# Patient Record
Sex: MALE | Race: BLACK OR AFRICAN AMERICAN | NOT HISPANIC OR LATINO | Employment: FULL TIME | URBAN - METROPOLITAN AREA
[De-identification: names, ages, dates, MRNs, and addresses within clinical notes are randomized per-mention and may not be internally consistent; named-entity substitution may affect disease eponyms.]

---

## 2017-10-26 ENCOUNTER — TELEPHONE (OUTPATIENT)
Dept: OPTOMETRY | Facility: CLINIC | Age: 52
End: 2017-10-26

## 2017-10-26 NOTE — TELEPHONE ENCOUNTER
LVM reminding pt of appt time,date and location advised to be on time no later than 15 min after scheduled apt time advised to call [phone] to reschedule if needed.

## 2017-10-26 NOTE — PROGRESS NOTES
Subjective:       Patient ID: Roberto Edgar is a 52 y.o. male.    Chief Complaint: Employment Physical    HPI   52 y.o. male here for annual physical exam. He is new to me and new to [facility].  He just moved here from [place].    Chronic medical issues:  HTN- well controlled on cozaar 100mg, did not take medication this am, he denies Cp or SOB.  No dizziness or lightheadedness.  He has been noticing vision changes and has appt with eye doctor today.    B/l Shoulder pain: since 2010, he was in the  and very physical work, he has had numbness in the 2nd and 3rd fingers on the left hand, he has pain on both sides of his shoulders, decreased ROM, he has had cortisone injection in th left shoulder in the past, decreased strength.     Health maintenance    Vaccines: Influenza declines   Tetanus 2015   A1c: due  HIV: agrees ages 15-65  Sexual Screening: declines  STD screening: declines  Eye exam: UTD annual  DDS exam: UTD q6 mos.  Prostate: PSA annual due  Colonoscopy: ages 50-75  Lipid disorders: due          Exercise: not exercising right now 2/2 shoulder pain   Diet: eats healthy for the most part      Past Medical History:   Diagnosis Date    Hypertension     Shoulder pain, bilateral       Past Surgical History:   Procedure Laterality Date    HERNIA REPAIR       Social History     Social History    Marital status:      Spouse name: N/A    Number of children: N/A    Years of education: N/A     Occupational History    Not on file.     Social History Main Topics    Smoking status: Never Smoker    Smokeless tobacco: Never Used    Alcohol use Yes      Comment: once in a while    Drug use: No    Sexual activity: Not Currently     Other Topics Concern    Not on file     Social History Narrative    No narrative on file     Review of patient's allergies indicates:  Allergies not on file  Mr. dEgar had no medications administered during this visit.      Review of Systems   Constitutional: Negative  for activity change, chills and fever.   HENT: Positive for sore throat. Negative for congestion, rhinorrhea, sinus pain and sinus pressure.    Eyes: Negative for pain and redness.   Respiratory: Positive for cough. Negative for shortness of breath.    Cardiovascular: Negative for chest pain and palpitations.   Gastrointestinal: Negative for abdominal pain, diarrhea, nausea and vomiting.   Genitourinary: Negative for difficulty urinating.        +ED     Musculoskeletal: Positive for arthralgias and neck pain. Negative for back pain.   Skin: Negative for rash.   Neurological: Positive for weakness (in shoulders). Negative for headaches.   Psychiatric/Behavioral: Negative for dysphoric mood and sleep disturbance.     Objective:     Vitals:    10/27/17 0903   BP: (!) 142/92   Pulse: 85   Resp: 16   Temp: 98.4 °F (36.9 °C)    Body mass index is 29.48 kg/m².  Physical Exam   Constitutional: He is oriented to person, place, and time. He appears well-developed and well-nourished.   HENT:   Head: Normocephalic and atraumatic.   Mouth/Throat: Oropharynx is clear and moist.   Eyes: Conjunctivae are normal. Pupils are equal, round, and reactive to light.   Neck: Normal range of motion. Neck supple. No thyromegaly present.   Cardiovascular: Normal rate, regular rhythm and normal heart sounds.  Exam reveals no gallop and no friction rub.    No murmur heard.  Pulmonary/Chest: Effort normal and breath sounds normal. He has no wheezes. He has no rales.   Abdominal: Soft. Bowel sounds are normal. There is no tenderness. There is no rebound and no guarding.   Musculoskeletal: Normal range of motion. He exhibits tenderness. He exhibits no edema.        Right shoulder: He exhibits tenderness (anterior shoulder), pain and decreased strength. He exhibits normal range of motion, no bony tenderness and no swelling.        Left shoulder: He exhibits tenderness (anterior shoulder), pain and decreased strength. He exhibits normal range of  motion, no bony tenderness and no swelling.   Lymphadenopathy:     He has no cervical adenopathy.   Neurological: He is alert and oriented to person, place, and time. He displays normal reflexes. No sensory deficit. Coordination normal.   Skin: Skin is warm and dry. No rash noted. No erythema.   Psychiatric: He has a normal mood and affect. Judgment normal.     Assessment:     1. Annual physical exam    2. Colon cancer screening    3. Encounter to establish care with new doctor    4. Encounter for screening for HIV    5. Essential hypertension    6. Chronic pain of both shoulders    7. Screening for prostate cancer    8. Erectile dysfunction, unspecified erectile dysfunction type      Plan:   1. Annual physical exam  - Comprehensive metabolic panel; Future  - CBC auto differential; Future  - Hemoglobin A1c; Future  - Lipid panel; Future  - TSH; Future  - Urinalysis Microscopic; Future  - Counseled patient on need to get regular exercise at least 30 minutes a day at high intensity 5 days a week.    2. Colon cancer screening  - Case request GI: COLONOSCOPY    3. Encounter to establish care with new doctor    4. Encounter for screening for HIV  - HIV-1 and HIV-2 antibodies; Future    5. Essential hypertension  - stable on cozaar 100mg    6. Chronic pain of both shoulders  - Ambulatory Referral to Orthopedics  - naproxen (NAPROSYN) 500 MG tablet; Take 1 tablet (500 mg total) by mouth 2 (two) times daily.  Dispense: 60 tablet; Refill: 1    7. Screening for prostate cancer  - PSA, Screening; Future    8. Erectile dysfunction, unspecified erectile dysfunction type  - tadalafil (CIALIS) 10 MG tablet; Take 1 tablet (10 mg total) by mouth daily as needed for Erectile Dysfunction.  Dispense: 30 tablet; Refill: 3  - trial cialis, if no improvement patient will let me know and we can get him to see urology      Return to clinic in one year for annual exam or sooner if dictated by labs or illness.

## 2017-10-27 ENCOUNTER — TELEPHONE (OUTPATIENT)
Dept: OPTOMETRY | Facility: CLINIC | Age: 52
End: 2017-10-27

## 2017-10-27 ENCOUNTER — OFFICE VISIT (OUTPATIENT)
Dept: INTERNAL MEDICINE | Facility: CLINIC | Age: 52
End: 2017-10-27
Payer: COMMERCIAL

## 2017-10-27 ENCOUNTER — OFFICE VISIT (OUTPATIENT)
Dept: OPTOMETRY | Facility: CLINIC | Age: 52
End: 2017-10-27
Payer: COMMERCIAL

## 2017-10-27 ENCOUNTER — LAB VISIT (OUTPATIENT)
Dept: LAB | Facility: HOSPITAL | Age: 52
End: 2017-10-27
Attending: INTERNAL MEDICINE
Payer: COMMERCIAL

## 2017-10-27 VITALS
HEIGHT: 64 IN | BODY MASS INDEX: 29.32 KG/M2 | TEMPERATURE: 98 F | SYSTOLIC BLOOD PRESSURE: 142 MMHG | DIASTOLIC BLOOD PRESSURE: 92 MMHG | HEART RATE: 85 BPM | WEIGHT: 171.75 LBS | OXYGEN SATURATION: 97 % | RESPIRATION RATE: 16 BRPM

## 2017-10-27 DIAGNOSIS — Z12.5 SCREENING FOR PROSTATE CANCER: ICD-10-CM

## 2017-10-27 DIAGNOSIS — H52.4 MYOPIA OF BOTH EYES WITH ASTIGMATISM AND PRESBYOPIA: Primary | ICD-10-CM

## 2017-10-27 DIAGNOSIS — Z00.00 ANNUAL PHYSICAL EXAM: ICD-10-CM

## 2017-10-27 DIAGNOSIS — Z11.4 ENCOUNTER FOR SCREENING FOR HIV: ICD-10-CM

## 2017-10-27 DIAGNOSIS — Z00.00 ANNUAL PHYSICAL EXAM: Primary | ICD-10-CM

## 2017-10-27 DIAGNOSIS — I10 ESSENTIAL HYPERTENSION: ICD-10-CM

## 2017-10-27 DIAGNOSIS — Z76.89 ENCOUNTER TO ESTABLISH CARE WITH NEW DOCTOR: ICD-10-CM

## 2017-10-27 DIAGNOSIS — M25.511 CHRONIC PAIN OF BOTH SHOULDERS: ICD-10-CM

## 2017-10-27 DIAGNOSIS — H52.203 MYOPIA OF BOTH EYES WITH ASTIGMATISM AND PRESBYOPIA: Primary | ICD-10-CM

## 2017-10-27 DIAGNOSIS — M25.512 CHRONIC PAIN OF BOTH SHOULDERS: ICD-10-CM

## 2017-10-27 DIAGNOSIS — N52.9 ERECTILE DYSFUNCTION, UNSPECIFIED ERECTILE DYSFUNCTION TYPE: ICD-10-CM

## 2017-10-27 DIAGNOSIS — H52.13 MYOPIA OF BOTH EYES WITH ASTIGMATISM AND PRESBYOPIA: Primary | ICD-10-CM

## 2017-10-27 DIAGNOSIS — G89.29 CHRONIC PAIN OF BOTH SHOULDERS: ICD-10-CM

## 2017-10-27 DIAGNOSIS — Z12.11 COLON CANCER SCREENING: ICD-10-CM

## 2017-10-27 LAB
ALBUMIN SERPL BCP-MCNC: 3.8 G/DL
ALP SERPL-CCNC: 75 U/L
ALT SERPL W/O P-5'-P-CCNC: 44 U/L
ANION GAP SERPL CALC-SCNC: 12 MMOL/L
AST SERPL-CCNC: 41 U/L
BASOPHILS # BLD AUTO: 0.07 K/UL
BASOPHILS NFR BLD: 1.1 %
BILIRUB SERPL-MCNC: 0.3 MG/DL
BUN SERPL-MCNC: 16 MG/DL
CALCIUM SERPL-MCNC: 9.3 MG/DL
CHLORIDE SERPL-SCNC: 105 MMOL/L
CHOLEST SERPL-MCNC: 163 MG/DL
CHOLEST/HDLC SERPL: 4 {RATIO}
CO2 SERPL-SCNC: 22 MMOL/L
COMPLEXED PSA SERPL-MCNC: 2.4 NG/ML
CREAT SERPL-MCNC: 1.1 MG/DL
DIFFERENTIAL METHOD: NORMAL
EOSINOPHIL # BLD AUTO: 0.2 K/UL
EOSINOPHIL NFR BLD: 2.3 %
ERYTHROCYTE [DISTWIDTH] IN BLOOD BY AUTOMATED COUNT: 13 %
EST. GFR  (AFRICAN AMERICAN): >60 ML/MIN/1.73 M^2
EST. GFR  (NON AFRICAN AMERICAN): >60 ML/MIN/1.73 M^2
ESTIMATED AVG GLUCOSE: 126 MG/DL
GLUCOSE SERPL-MCNC: 105 MG/DL
HBA1C MFR BLD HPLC: 6 %
HCT VFR BLD AUTO: 44.5 %
HDLC SERPL-MCNC: 41 MG/DL
HDLC SERPL: 25.2 %
HGB BLD-MCNC: 15.1 G/DL
IMM GRANULOCYTES # BLD AUTO: 0.03 K/UL
IMM GRANULOCYTES NFR BLD AUTO: 0.5 %
LDLC SERPL CALC-MCNC: 108.4 MG/DL
LYMPHOCYTES # BLD AUTO: 2.3 K/UL
LYMPHOCYTES NFR BLD: 35.6 %
MCH RBC QN AUTO: 30.1 PG
MCHC RBC AUTO-ENTMCNC: 33.9 G/DL
MCV RBC AUTO: 89 FL
MONOCYTES # BLD AUTO: 0.6 K/UL
MONOCYTES NFR BLD: 9.3 %
NEUTROPHILS # BLD AUTO: 3.4 K/UL
NEUTROPHILS NFR BLD: 51.2 %
NONHDLC SERPL-MCNC: 122 MG/DL
NRBC BLD-RTO: 0 /100 WBC
PLATELET # BLD AUTO: 206 K/UL
PMV BLD AUTO: 10.8 FL
POTASSIUM SERPL-SCNC: 3.9 MMOL/L
PROT SERPL-MCNC: 7.3 G/DL
RBC # BLD AUTO: 5.02 M/UL
SODIUM SERPL-SCNC: 139 MMOL/L
TRIGL SERPL-MCNC: 68 MG/DL
TSH SERPL DL<=0.005 MIU/L-ACNC: 2.46 UIU/ML
WBC # BLD AUTO: 6.57 K/UL

## 2017-10-27 PROCEDURE — 84153 ASSAY OF PSA TOTAL: CPT

## 2017-10-27 PROCEDURE — 85025 COMPLETE CBC W/AUTO DIFF WBC: CPT

## 2017-10-27 PROCEDURE — 80061 LIPID PANEL: CPT

## 2017-10-27 PROCEDURE — 99386 PREV VISIT NEW AGE 40-64: CPT | Mod: S$GLB,,, | Performed by: INTERNAL MEDICINE

## 2017-10-27 PROCEDURE — 92015 DETERMINE REFRACTIVE STATE: CPT | Mod: S$GLB,,, | Performed by: OPTOMETRIST

## 2017-10-27 PROCEDURE — 86703 HIV-1/HIV-2 1 RESULT ANTBDY: CPT

## 2017-10-27 PROCEDURE — 99999 PR PBB SHADOW E&M-EST. PATIENT-LVL IV: CPT | Mod: PBBFAC,,, | Performed by: INTERNAL MEDICINE

## 2017-10-27 PROCEDURE — 83036 HEMOGLOBIN GLYCOSYLATED A1C: CPT

## 2017-10-27 PROCEDURE — 99999 PR PBB SHADOW E&M-EST. PATIENT-LVL II: CPT | Mod: PBBFAC,,, | Performed by: OPTOMETRIST

## 2017-10-27 PROCEDURE — 92004 COMPRE OPH EXAM NEW PT 1/>: CPT | Mod: S$GLB,,, | Performed by: OPTOMETRIST

## 2017-10-27 PROCEDURE — 84443 ASSAY THYROID STIM HORMONE: CPT

## 2017-10-27 PROCEDURE — 36415 COLL VENOUS BLD VENIPUNCTURE: CPT | Mod: PO

## 2017-10-27 PROCEDURE — 80053 COMPREHEN METABOLIC PANEL: CPT

## 2017-10-27 RX ORDER — TADALAFIL 10 MG/1
10 TABLET ORAL DAILY PRN
Qty: 30 TABLET | Refills: 3 | Status: SHIPPED | OUTPATIENT
Start: 2017-10-27 | End: 2018-05-24

## 2017-10-27 RX ORDER — NAPROXEN 500 MG/1
500 TABLET ORAL 2 TIMES DAILY
Qty: 60 TABLET | Refills: 1 | Status: SHIPPED | OUTPATIENT
Start: 2017-10-27 | End: 2018-05-24

## 2017-10-27 RX ORDER — LOSARTAN POTASSIUM 100 MG/1
100 TABLET ORAL DAILY
COMMUNITY
End: 2018-03-12 | Stop reason: SDUPTHER

## 2017-10-27 NOTE — TELEPHONE ENCOUNTER
Called to confirm appt pt confirmed reminded of appt time,date and location advised to be on time no later than 15 min after appt time advised to call [phone] to reschedule if needed.

## 2017-10-27 NOTE — PROGRESS NOTES
HPI     First Eye Exam- does not wear glasses or CL   Pt states harder to read smaller prints and also reading street signs at a   distance. Pt states when he was younger he had a hx ocular migraines  Denies f/f  Never worn spex  No gtts     Last edited by Lavelle Melchor, OD on 10/27/2017  1:09 PM. (History)        ROS     Negative for: Constitutional, Gastrointestinal, Neurological, Skin,   Genitourinary, Musculoskeletal, HENT, Endocrine, Cardiovascular, Eyes,   Respiratory, Psychiatric, Allergic/Imm, Heme/Lymph    Last edited by Lavelle Melchor, OD on 10/27/2017  1:09 PM. (History)        Assessment /Plan     For exam results, see Encounter Report.    Myopia of both eyes with astigmatism and presbyopia      Pt never worn spex--now needs bifocals.  Discussed PALs/adaptation    PLAN:    1. Wrote spex Rx  2. rtc 1 yr

## 2017-10-30 ENCOUNTER — TELEPHONE (OUTPATIENT)
Dept: INTERNAL MEDICINE | Facility: CLINIC | Age: 52
End: 2017-10-30

## 2017-10-30 LAB — HIV 1+2 AB+HIV1 P24 AG SERPL QL IA: NEGATIVE

## 2017-10-31 NOTE — TELEPHONE ENCOUNTER
Please notify patient he is pre-diabetic.  He needs to follow heart healthy diet and exercise 5 days/week for at least 30 minutes.  We will need to recheck in 3 months and if still elevated, we will consider medication at that time.     I have reviewed rest of lab results which are normal or stable.     Clotilde Amos MD

## 2017-10-31 NOTE — TELEPHONE ENCOUNTER
Patient informed of results and verbalized understanding.  Repeat labs ordered and scheduled. Appointment slip mailed to patient.

## 2018-03-12 ENCOUNTER — LAB VISIT (OUTPATIENT)
Dept: LAB | Facility: HOSPITAL | Age: 53
End: 2018-03-12
Attending: INTERNAL MEDICINE

## 2018-03-12 ENCOUNTER — OFFICE VISIT (OUTPATIENT)
Dept: INTERNAL MEDICINE | Facility: CLINIC | Age: 53
End: 2018-03-12
Payer: COMMERCIAL

## 2018-03-12 VITALS
HEART RATE: 89 BPM | BODY MASS INDEX: 27.7 KG/M2 | HEIGHT: 64 IN | TEMPERATURE: 98 F | WEIGHT: 162.25 LBS | RESPIRATION RATE: 16 BRPM | DIASTOLIC BLOOD PRESSURE: 76 MMHG | SYSTOLIC BLOOD PRESSURE: 120 MMHG

## 2018-03-12 DIAGNOSIS — R73.03 PREDIABETES: ICD-10-CM

## 2018-03-12 DIAGNOSIS — Z09 FOLLOW UP: Primary | ICD-10-CM

## 2018-03-12 DIAGNOSIS — I10 ESSENTIAL HYPERTENSION: ICD-10-CM

## 2018-03-12 LAB
ALBUMIN SERPL BCP-MCNC: 4 G/DL
ALP SERPL-CCNC: 63 U/L
ALT SERPL W/O P-5'-P-CCNC: 29 U/L
ANION GAP SERPL CALC-SCNC: 5 MMOL/L
AST SERPL-CCNC: 24 U/L
BILIRUB SERPL-MCNC: 0.3 MG/DL
BUN SERPL-MCNC: 14 MG/DL
CALCIUM SERPL-MCNC: 9.4 MG/DL
CHLORIDE SERPL-SCNC: 107 MMOL/L
CO2 SERPL-SCNC: 29 MMOL/L
CREAT SERPL-MCNC: 1.1 MG/DL
EST. GFR  (AFRICAN AMERICAN): >60 ML/MIN/1.73 M^2
EST. GFR  (NON AFRICAN AMERICAN): >60 ML/MIN/1.73 M^2
ESTIMATED AVG GLUCOSE: 114 MG/DL
GLUCOSE SERPL-MCNC: 108 MG/DL
HBA1C MFR BLD HPLC: 5.6 %
POTASSIUM SERPL-SCNC: 4.1 MMOL/L
PROT SERPL-MCNC: 7.1 G/DL
SODIUM SERPL-SCNC: 141 MMOL/L

## 2018-03-12 PROCEDURE — 3078F DIAST BP <80 MM HG: CPT | Mod: CPTII,S$GLB,, | Performed by: INTERNAL MEDICINE

## 2018-03-12 PROCEDURE — 83036 HEMOGLOBIN GLYCOSYLATED A1C: CPT

## 2018-03-12 PROCEDURE — 3074F SYST BP LT 130 MM HG: CPT | Mod: CPTII,S$GLB,, | Performed by: INTERNAL MEDICINE

## 2018-03-12 PROCEDURE — 99214 OFFICE O/P EST MOD 30 MIN: CPT | Mod: S$GLB,,, | Performed by: INTERNAL MEDICINE

## 2018-03-12 PROCEDURE — 36415 COLL VENOUS BLD VENIPUNCTURE: CPT | Mod: PO

## 2018-03-12 PROCEDURE — 80053 COMPREHEN METABOLIC PANEL: CPT

## 2018-03-12 PROCEDURE — 99999 PR PBB SHADOW E&M-EST. PATIENT-LVL III: CPT | Mod: PBBFAC,,, | Performed by: INTERNAL MEDICINE

## 2018-03-12 RX ORDER — LOSARTAN POTASSIUM 100 MG/1
100 TABLET ORAL DAILY
Qty: 90 TABLET | Refills: 3 | Status: SHIPPED | OUTPATIENT
Start: 2018-03-12 | End: 2019-04-18 | Stop reason: SDUPTHER

## 2018-03-12 NOTE — PATIENT INSTRUCTIONS
If your blood pressure running <100 on top number, take 1/2 tab of your blood pressure medication    Call me if blood pressure running low

## 2018-03-12 NOTE — PROGRESS NOTES
Subjective:       Patient ID: Roberto Edgar is a 53 y.o. male.    Chief Complaint: Medication Refill    HPI   HTN: he notes running out of cozaar about 2 weeks ago.  He denies any dizziness or lightheadedness.  No CP or SOB.  He has been modifying his diet and has lost about 10 lbs since previous visit.  He has not been taking his cozaar and BP is well controlled.  He was pre-diabetic at previous visit and is due for follow up labs.  He has not been checking BP at home regularly.       Review of Systems   Constitutional: Negative for activity change, chills and fever.   HENT: Negative for congestion, rhinorrhea, sinus pain, sinus pressure and sore throat.    Eyes: Negative for pain and redness.   Respiratory: Negative for cough and shortness of breath.    Cardiovascular: Negative for chest pain and palpitations.   Gastrointestinal: Negative for abdominal pain, diarrhea, nausea and vomiting.   Genitourinary: Negative for difficulty urinating.   Musculoskeletal: Negative for arthralgias and back pain.   Skin: Negative for rash.   Neurological: Negative for weakness and headaches.   Psychiatric/Behavioral: Negative for dysphoric mood and sleep disturbance.     Objective:     Vitals:    03/12/18 0919   BP: 120/76   Pulse: 89   Resp: 16   Temp: 98.4 °F (36.9 °C)    Body mass index is 27.85 kg/m².  Physical Exam   Constitutional: He is oriented to person, place, and time. He appears well-developed and well-nourished.   HENT:   Head: Normocephalic and atraumatic.   Mouth/Throat: Oropharynx is clear and moist.   Eyes: Conjunctivae are normal. Pupils are equal, round, and reactive to light.   Neck: Normal range of motion. Neck supple. No thyromegaly present.   Cardiovascular: Normal rate, regular rhythm and normal heart sounds.  Exam reveals no gallop and no friction rub.    No murmur heard.  Pulmonary/Chest: Effort normal and breath sounds normal. He has no wheezes. He has no rales.   Musculoskeletal: Normal range of  motion. He exhibits no edema or tenderness.   Lymphadenopathy:     He has no cervical adenopathy.   Neurological: He is alert and oriented to person, place, and time.   Skin: Skin is warm and dry. No rash noted. No erythema.   Psychiatric: He has a normal mood and affect. Judgment normal.     Assessment:     1. Follow up    2. Essential hypertension    3. Prediabetes      Plan:   1. Follow up    2. Essential hypertension  - counseled on monitoring BP at home  - will call me if BP running low  - losartan (COZAAR) 100 MG tablet; Take 1 tablet (100 mg total) by mouth once daily.  Dispense: 90 tablet; Refill: 3    3. Prediabetes  - Counseled patient on need to get regular exercise at least 30 minutes a day at high intensity 5 days a week.  - Hemoglobin A1c; Future  - Comprehensive metabolic panel; Future      Return to clinic in six months for annual exam or sooner if dictated by labs or illness.

## 2018-03-13 ENCOUNTER — TELEPHONE (OUTPATIENT)
Dept: INTERNAL MEDICINE | Facility: CLINIC | Age: 53
End: 2018-03-13

## 2018-03-13 NOTE — TELEPHONE ENCOUNTER
Please notify patient:    I have reviewed all lab results which are normal or stable. No longer pre-diabetic! Great job! Continue weight loss, exercise and heart healthy diet.      Clotilde Amos MD

## 2018-04-26 ENCOUNTER — TELEPHONE (OUTPATIENT)
Dept: INTERNAL MEDICINE | Facility: CLINIC | Age: 53
End: 2018-04-26

## 2018-04-26 DIAGNOSIS — Z12.11 COLON CANCER SCREENING: Primary | ICD-10-CM

## 2018-04-26 DIAGNOSIS — N52.9 ERECTILE DYSFUNCTION, UNSPECIFIED ERECTILE DYSFUNCTION TYPE: Primary | ICD-10-CM

## 2018-04-26 NOTE — TELEPHONE ENCOUNTER
----- Message from Sherley Ybarra sent at 4/26/2018 11:51 AM CDT -----  Contact: pt [phone]  Pt would like to get a prescription for ebex. He was previously prescribed it by another physician and would like to get it refilled. Please advise.

## 2018-05-08 DIAGNOSIS — Z12.11 SPECIAL SCREENING FOR MALIGNANT NEOPLASMS, COLON: Primary | ICD-10-CM

## 2018-05-08 RX ORDER — POLYETHYLENE GLYCOL 3350, SODIUM SULFATE ANHYDROUS, SODIUM BICARBONATE, SODIUM CHLORIDE, POTASSIUM CHLORIDE 236; 22.74; 6.74; 5.86; 2.97 G/4L; G/4L; G/4L; G/4L; G/4L
4 POWDER, FOR SOLUTION ORAL ONCE
Qty: 4000 ML | Refills: 0 | Status: SHIPPED | OUTPATIENT
Start: 2018-05-08 | End: 2018-05-08

## 2018-05-24 ENCOUNTER — OFFICE VISIT (OUTPATIENT)
Dept: UROLOGY | Facility: CLINIC | Age: 53
End: 2018-05-24
Payer: COMMERCIAL

## 2018-05-24 ENCOUNTER — LAB VISIT (OUTPATIENT)
Dept: LAB | Facility: HOSPITAL | Age: 53
End: 2018-05-24
Attending: UROLOGY

## 2018-05-24 VITALS
HEART RATE: 73 BPM | SYSTOLIC BLOOD PRESSURE: 127 MMHG | HEIGHT: 64 IN | BODY MASS INDEX: 27.1 KG/M2 | DIASTOLIC BLOOD PRESSURE: 82 MMHG | WEIGHT: 158.75 LBS

## 2018-05-24 DIAGNOSIS — N52.01 ERECTILE DYSFUNCTION DUE TO ARTERIAL INSUFFICIENCY: Primary | ICD-10-CM

## 2018-05-24 DIAGNOSIS — I10 ESSENTIAL HYPERTENSION: ICD-10-CM

## 2018-05-24 DIAGNOSIS — N40.1 BPH WITH URINARY OBSTRUCTION: ICD-10-CM

## 2018-05-24 DIAGNOSIS — N13.8 BPH WITH URINARY OBSTRUCTION: ICD-10-CM

## 2018-05-24 DIAGNOSIS — N52.01 ERECTILE DYSFUNCTION DUE TO ARTERIAL INSUFFICIENCY: ICD-10-CM

## 2018-05-24 LAB — TESTOST SERPL-MCNC: 408 NG/DL

## 2018-05-24 PROCEDURE — 99999 PR PBB SHADOW E&M-EST. PATIENT-LVL III: CPT | Mod: PBBFAC,,, | Performed by: UROLOGY

## 2018-05-24 PROCEDURE — 36415 COLL VENOUS BLD VENIPUNCTURE: CPT

## 2018-05-24 PROCEDURE — 84403 ASSAY OF TOTAL TESTOSTERONE: CPT

## 2018-05-24 PROCEDURE — 99244 OFF/OP CNSLTJ NEW/EST MOD 40: CPT | Mod: S$GLB,,, | Performed by: UROLOGY

## 2018-05-24 RX ORDER — PAPAVERINE HYDROCHLORIDE 30 MG/ML
INJECTION INTRAMUSCULAR; INTRAVENOUS
Qty: 5 ML | Refills: 0 | Status: SHIPPED | OUTPATIENT
Start: 2018-05-24 | End: 2018-05-29 | Stop reason: ALTCHOICE

## 2018-05-24 NOTE — PATIENT INSTRUCTIONS
Penile Self-Injection Procedure  Self-injection is a good option for many men with erectile dysfunction (ED). A tiny needle is used to inject medication into the penis. This helps your penis get hard and stay that way long enough for sex. And sex and orgasm will feel as good as always. You may be nervous about doing self-injection at first. But with practice, it will get easier. Your healthcare provider will show you how to do self-injection the first time. The simple steps are outlined on this sheet.  Preparing for Injection  · Wash your hands well with soap and water.  · Prepare the medication (if needed).  · Sit or  a comfortable position in a warm, well-lit room. If you need to, sit or  front of a mirror.  · Find an injection site on one side of your penis, in a place with no visible veins. (Dont inject into the top, bottom, or head of the penis.)  · Clean the injection site with an alcohol swab. Grasp the head of your penis firmly with your thumb and forefinger (dont just pinch the skin). Stretch the penis so the skin on the shaft is taut.  Injecting the Medication  · Rest your penis against your inner thigh and pull it gently toward your knee. Dont twist or rotate it. This way youll be sure to inject the medication into the spot you chose and cleaned before.  · Hold the syringe between your thumb and fingers, like youre holding a pen. Rest your forearm on your thigh for support.  · Insert the needle at a 90-degree angle (perpendicular) to the shaft. Do this quickly to reduce discomfort. (The needle should go in easily. If it doesnt, stop right away.)  · Move your thumb to the plunger. Press down to inject the medication, counting to 5.  · Remove the needle and dispose of it safely.     The injection site can be in any part of the shaded area.     Insert the needle straight into the penis.    Gaining an Erection  · Apply pressure to the injection site for a few minutes. This prevents  swelling and bruising and helps spread the medication.   · Stand up. This may help your erection develop. Foreplay often helps, too.  · Your penis should become firm within 10-20 minutes. The erection will last long enough for sex, and maybe longer.  Call Your Doctor If You Have:   · An erection that lasts longer than 3-4  hours  · Bleeding or bruising  · Severe pain  · Scarring or curvature of the penis       © 7726-2678 [facility], [address] [place] [address], [place] [address]. All rights reserved. This information is not intended as a substitute for professional medical care. Always follow your healthcare professional's instructions.

## 2018-05-24 NOTE — PROGRESS NOTES
CHIEF COMPLAINT:    Mr. Edgar is a 53 y.o. male presenting for a consultation at the request of Dr. Amos. Patient presents with ED.    PRESENTING ILLNESS:    Roberto Edgar is a 53 y.o. male who c/o ED.  This has been present for > 1 year.  He has tried and failed Viagra and Cialis.  He has not had a T checked.    He has nocturia x 0-1.  Good FOS.  No hematuria.  No dysuria.    REVIEW OF SYSTEMS:    Roberto Edgar denies headache, blurred vision, fever, nausea, vomiting, chills, abdominal pain, bleeding per rectum, cough, SOB, recent loss of consciousness, recent mental status changes, seizures, dizziness, or upper or lower extremity weakness.    JUVE  1. 1  2. 2  3. 2  4. 1  5. 1      PATIENT HISTORY:    Past Medical History:   Diagnosis Date    Hypertension     Shoulder pain, bilateral        Past Surgical History:   Procedure Laterality Date    HERNIA REPAIR         Family History   Problem Relation Age of Onset    Hypertension Mother     Diabetes Father     Cancer Father         prostate ca    Cancer Paternal Uncle         prostate ca    Cancer Paternal Grandmother         prostate ca    Glaucoma Neg Hx     Macular degeneration Neg Hx     Retinal detachment Neg Hx     Strabismus Neg Hx     Cataracts Neg Hx     Amblyopia Neg Hx     Blindness Neg Hx        Social History     Social History    Marital status:      Spouse name: N/A    Number of children: N/A    Years of education: N/A     Occupational History    Not on file.     Social History Main Topics    Smoking status: Never Smoker    Smokeless tobacco: Never Used    Alcohol use Yes      Comment: once in a while    Drug use: No    Sexual activity: Not Currently     Other Topics Concern    Not on file     Social History Narrative    No narrative on file       Allergies:  Patient has no known allergies.    Medications:    Current Outpatient Prescriptions:     losartan (COZAAR) 100 MG tablet, Take 1 tablet (100 mg total) by  mouth once daily., Disp: 90 tablet, Rfl: 3    PHYSICAL EXAMINATION:    The patient generally appears in good health, is appropriately interactive, and is in no apparent distress.     Eyes: anicteric sclerae, moist conjunctivae; no lid-lag; PERRLA     HENT: Atraumatic; oropharynx clear with moist mucous membranes and no mucosal ulcerations;normal hard and soft palate.  No evidence of lymphadenopathy.    Neck: Trachea midline.  No thyromegaly.    Musculoskeletal: No abnormal gait.    Skin: No lesions.    Mental: Cooperative with normal affect.  Is oriented to time, place, and person.    Neuro: Grossly intact.    Chest: Normal inspiratory effort.   No accessory muscles.  No audible wheezes.  Respirations symmetric on inspiration and expiration.    Heart: Regular rhythm.      Abdomen:  Soft, non-tender. No masses or organomegaly. Bladder is not palpable. No evidence of flank discomfort. No evidence of inguinal hernia.    Genitourinary: The penis is circumcised with no evidence of plaques or induration. The urethral meatus is normal. The testes, epididymides, and cord structures are normal in size and contour bilaterally. The scrotum is normal in size and contour.    Normal anal sphincter tone. No rectal mass.    The prostate is 40 g. Normal landmarks. Lateral sulci. Median furrow intact.  No nodularity or induration. Seminal vesicles are normal.    Extremities: No clubbing, cyanosis, or edema      LABS:      Lab Results   Component Value Date    PSA 2.4 10/27/2017       IMPRESSION:    Encounter Diagnoses   Name Primary?    Erectile dysfunction due to arterial insufficiency Yes    BPH with urinary obstruction     Essential hypertension    HTN, controlled      PLAN:    1. Discussed options for his ED.  He'd like [facility]. Side effects discussed.  A new Rx was given.  Will set up for teaching.  2. Will check a T for the ED.  3. Will observe his LUTS as they don't bother him.   4. RTC 3 months.    Copy to:

## 2018-05-24 NOTE — LETTER
May 24, 2018      Clotilde Amos MD  [address] [facility] [address]           [facility] - Urology 4th Floor  [address]  [place] [address]  Phone: [phone]          Patient: Roberto Edgar   MR Number: [id number]   YOB: 1965   Date of Visit: 5/24/2018       Dear Dr. Clotilde Amos:    Thank you for referring Roberto Edgar to me for evaluation. Attached you will find relevant portions of my assessment and plan of care.    If you have questions, please do not hesitate to call me. I look forward to following Roberto Edgar along with you.    Sincerely,    Cm Candelario MD    Enclosure  CC:  No Recipients    If you would like to receive this communication electronically, please contact [email or web address] or [phone] to request more information on [employer] Link access.    For providers and/or their staff who would like to refer a patient to [facility], please contact us through our one-stop-shop provider referral line, [facility] , at [phone].    If you feel you have received this communication in error or would no longer like to receive these types of communications, please e-mail [email or web address]

## 2018-05-29 ENCOUNTER — OFFICE VISIT (OUTPATIENT)
Dept: UROLOGY | Facility: CLINIC | Age: 53
End: 2018-05-29
Payer: COMMERCIAL

## 2018-05-29 VITALS
BODY MASS INDEX: 27.31 KG/M2 | SYSTOLIC BLOOD PRESSURE: 151 MMHG | HEIGHT: 64 IN | TEMPERATURE: 99 F | DIASTOLIC BLOOD PRESSURE: 94 MMHG | HEART RATE: 79 BPM | WEIGHT: 160 LBS

## 2018-05-29 DIAGNOSIS — N48.33 DRUG-INDUCED PRIAPISM: ICD-10-CM

## 2018-05-29 DIAGNOSIS — N52.01 ERECTILE DYSFUNCTION DUE TO ARTERIAL INSUFFICIENCY: Primary | ICD-10-CM

## 2018-05-29 PROCEDURE — 99999 PR PBB SHADOW E&M-EST. PATIENT-LVL III: CPT | Mod: PBBFAC,,, | Performed by: NURSE PRACTITIONER

## 2018-05-29 PROCEDURE — 99215 OFFICE O/P EST HI 40 MIN: CPT | Mod: S$GLB,,, | Performed by: NURSE PRACTITIONER

## 2018-05-29 RX ORDER — PAPAVERINE HYDROCHLORIDE 30 MG/ML
INJECTION INTRAMUSCULAR; INTRAVENOUS
Qty: 10 ML | Refills: 11 | Status: SHIPPED | OUTPATIENT
Start: 2018-05-29 | End: 2020-01-03 | Stop reason: SDUPTHER

## 2018-05-29 RX ORDER — PHENYLEPHRINE HYDROCHLORIDE 10 MG/ML
10 INJECTION INTRAVENOUS
Status: COMPLETED | OUTPATIENT
Start: 2018-05-29 | End: 2018-05-29

## 2018-05-29 RX ORDER — PAPAVERINE HYDROCHLORIDE 30 MG/ML
INJECTION INTRAMUSCULAR; INTRAVENOUS
Qty: 10 ML | Refills: 11 | Status: SHIPPED | OUTPATIENT
Start: 2018-05-29 | End: 2018-05-29 | Stop reason: SDUPTHER

## 2018-05-29 RX ADMIN — PHENYLEPHRINE HYDROCHLORIDE 10 MG: 10 INJECTION INTRAVENOUS at 11:05

## 2018-05-29 NOTE — PROGRESS NOTES
Subjective:       Patient ID: Roberto Edgar is a 53 y.o. male.    Chief Complaint: Erectile Dysfunction (PEP inj training)    Roberto Edgar is a 53 y.o. male who c/o ED.    He was last seen in clinic with Dr. Candelario 05/24/2018.  This has been present for > 1 year.    He has tried and failed Viagra and Cialis.    His Testosterone normal.    He is here today for ICI education and 1st injection.   He brought his own medication with him.  He states no longer with full erections. Difficulty maintaining.  Had tried Edex years ago and it worked well.             Past Medical History:  No date: Hypertension  No date: Shoulder pain, bilateral    Past Surgical History:  No date: HERNIA REPAIR    Review of patient's family history indicates:  Problem: Hypertension      Relation: Mother       Age of Onset: (Not Specified)   Problem: Diabetes      Relation: Father       Age of Onset: (Not Specified)   Problem: Cancer      Relation: Father       Age of Onset: (Not Specified)       Comment: prostate ca  Problem: Cancer      Relation: Paternal Uncle       Age of Onset: (Not Specified)       Comment: prostate ca  Problem: Cancer      Relation: Paternal Grandmother       Age of Onset: (Not Specified)       Comment: prostate ca  Problem: Glaucoma      Relation: Neg Hx       Age of Onset: (Not Specified)   Problem: Macular degeneration      Relation: Neg Hx       Age of Onset: (Not Specified)   Problem: Retinal detachment      Relation: Neg Hx       Age of Onset: (Not Specified)   Problem: Strabismus      Relation: Neg Hx       Age of Onset: (Not Specified)   Problem: Cataracts      Relation: Neg Hx       Age of Onset: (Not Specified)   Problem: Amblyopia      Relation: Neg Hx       Age of Onset: (Not Specified)   Problem: Blindness      Relation: Neg Hx       Age of Onset: (Not Specified)       Social History    Marital status:             Spouse name:                       Years of education:                 Number of  children:               Occupational History    None on file    Social History Main Topics    Smoking status: Never Smoker                                                                Smokeless tobacco: Never Used                        Alcohol use: Yes                Comment: once in a while    Drug use: No              Sexual activity: Not Currently        Other Topics            Concern    None on file    Social History Narrative    None on file        Allergies:  Patient has no known allergies.    Medications:  Current Outpatient Prescriptions:   losartan (COZAAR) 100 MG tablet, Take 1 tablet (100 mg total) by mouth once daily., Disp: 90 tablet, Rfl: 3  papaverine 30 mg/mL injection, Add: Phentolamine 10 mg Add: PGE1 100 mcg  Sig:  Inject 5 units (0.05 mls) as directed, Disp: 10 mL, Rfl: 11  No current facility-administered medications for this visit.           Review of Systems   Constitutional: Negative for activity change, appetite change, chills and fever.   HENT: Negative for facial swelling and trouble swallowing.    Eyes: Negative for visual disturbance.   Respiratory: Negative for chest tightness and shortness of breath.    Cardiovascular: Negative for chest pain and palpitations.   Gastrointestinal: Negative.  Negative for abdominal pain, constipation, diarrhea, nausea and vomiting.   Genitourinary: Negative for difficulty urinating, dysuria, flank pain, hematuria, penile pain, penile swelling, scrotal swelling and testicular pain.           He has nocturia x 0-1.  Good FOS.  No hematuria.  No dysuria.   Musculoskeletal: Negative for back pain, gait problem, myalgias and neck stiffness.   Skin: Negative for rash.   Neurological: Negative for dizziness and speech difficulty.   Hematological: Does not bruise/bleed easily.   Psychiatric/Behavioral: Negative for behavioral problems.       Objective:      Physical Exam   Nursing note and vitals reviewed.  Constitutional: He is oriented to person, place,  and time. He appears well-developed and well-nourished.  Non-toxic appearance. He does not have a sickly appearance.                         PSA                      2.4                 10/27/2017               HENT:   Head: Normocephalic and atraumatic.   Right Ear: External ear normal.   Left Ear: External ear normal.   Nose: Nose normal.   Mouth/Throat: Mucous membranes are normal.   Eyes: Conjunctivae and lids are normal. No scleral icterus.   Neck: Trachea normal, normal range of motion and full passive range of motion without pain. Neck supple. No JVD present. No tracheal deviation present.   Cardiovascular: Normal rate, S1 normal and S2 normal.    Pulmonary/Chest: Effort normal. No respiratory distress. He exhibits no tenderness.   Abdominal: Soft. Normal appearance and bowel sounds are normal. There is no hepatosplenomegaly. There is no tenderness. There is no CVA tenderness.   Genitourinary: Testes normal and penis normal. Right testis shows no swelling and no tenderness. Left testis shows no swelling and no tenderness. Circumcised. No hypospadias, penile erythema or penile tenderness. No discharge found.   Musculoskeletal: Normal range of motion.   Neurological: He is alert and oriented to person, place, and time. He has normal strength.   Skin: Skin is warm, dry and intact.     Psychiatric: He has a normal mood and affect. His behavior is normal. Judgment and thought content normal.       Assessment:       1. Erectile dysfunction due to arterial insufficiency    2. Drug-induced priapism        Plan:         I spent >60 minutes with the patient of which more than half was spent in direct consultation with the patient in regards to our treatment and plan.    Education and recommendations of today's plan of care including home remedies.  We discussed ED and the contributing factors. We reviewed his personal factors that contribute to ED. Patient was educated on ED treatments. We discussed PDE-5 inhibitors,  JACKY, Nguyễn, and MEHUL.    He is here today for the Intracorporal injection/ICI education and first injection.  Educational materials were supplied.    ICI is an injectable medication that consists of three different medications to produce an erection. It is known as a Trimix Compound or PEP. The medication is a compound medication and is only mixed up at certain pharmacies known as a compound pharmacy. The medication has to be stored in the refrigerator. Improper storage decreases the effectiveness of the medication.     He was educated that it is an injection. With any injection there is risk for possible pain at the injection site as well as risk for an infection. Clean technique must be followed to help prevent infection. Proper needle disposable is necessary. He voices understanding.    The injection is best given when standing up and the penis is positioned perpendicular to the body. The urethral opening should be facing away from the body. Injection is always given on the lateral or side of the penis. Never give the injection to the top of the penis to avoid possible trauma to arteries and veins. Never give the injection to the bottom of the penis to avoid trauma to the urethra. He should alternate the injection sites to avoid the build up of scar tissue due to multiple injections.    This medication can increase the risk of erections lasting longer than 4 hours. This is known as a priapism and is a medical emergency. This requires immediate medical attention. This is main reason we give the first dose in the office today. We start at low dose and see how well the patients reacts. We can increase the medication if needed depending on the reaction the patient receives today. We discussed the fine line between enough medication for penetration and too much leading to a priapism. He voices understanding.    He witnessed me draw up 10 units (0.10ml) and he injected himself in the left lateral aspect of the penis.  Within ~10 minutes, he achieved an erection firm enough for intercourse; even firmer with standing up and stimulation.  30 minutes later he had a full erection. Still denied any discomfort.   He successfully demonstrated drawing up the appropriate dosage.   2 hours he still had full erection but no discomfort. We discussed this; he still within 4 hour window but agreed to have it reduced in the office before it got painful.   I discussed chemical detumensce and he voiced understanding  Phenylephrine 10mg/ml mixed with 19cc of sterile normal saline prepared.   11:17 BP: 129/84 P 64 1cc mixture given  11:21: /88 P 66 1cc given  11:23: /102;p 50; med held; no complaints  11:25 /89   11:35 bp 135/90 p 80. Voiced no complaints. detumescence noted.   He denied any discomfort.   He pleased at how well it worked.     He was instructed to keep the dosage at 5 units. If noticing a decrease in reaction he can increase the dosage by 5 units or 0.05ml. He may also refill the Rx.     If have any questions, please give me a call. Educational materials were given to patient and all questions were answered. He voiced understanding and left the office without a priapism.

## 2018-05-29 NOTE — PATIENT INSTRUCTIONS
Understanding Erectile Dysfunction    Erectile dysfunction (ED) is a problem getting an erection firm enough or keeping it long enough for intercourse. The problem can happen to any man at any age. But health problems that can lead to ED become more common as a man ages. Up to half of men over age 40 experience ED at some point.  Causes of ED  ED can have many causes. Most are physical. Some are emotional issues. Often, a combination of causes is involved. Causes of ED may include:  · Medical conditions such as diabetes or depression  · Smoking tobacco or marijuana  · Drinking too much alcohol  · Side effects of medications  · Injury to nerves or blood vessels  · Emotional issues such as stress or relationship problems  ED can be treated  Prescription medications for ED are available. They help many men who try them. Depending upon the cause of the ED, though, medications may not be enough. In these cases, other treatment options are available. These include erectile aids and surgery. Your health care provider can tell you more about the treatment that is right for you. And new treatments for ED are being studied. No matter what the treatment you decide on, stay in touch with your doctor. If your symptoms persist, he or she may be able to adjust your current treatment or try something new.  Date Last Reviewed: 1/1/2017  © 3095-2649 [employer]. [address] [place] [address], [place] [address]. All rights reserved. This information is not intended as a substitute for professional medical care. Always follow your healthcare professional's instructions.        Penile Self-Injection: Notes and Precautions     Man and healthcare provider sitting across from one another, talking.   Penile self-injection is a simple technique that may improve your sex life. Some men even find that self-injection leads to an increase in natural erections. If you have questions or concerns about self-injection or erectile  dysfunction (ED), talk with your healthcare provider. The information on this sheet will help you get the best results.  Notes about penile self-injection  · You may feel a mild burning during injection. This is OK. But if you feel pressure or severe pain, stop the injection. There may be a problem with the injection site.  · Only inject the medicine on the side of your penis. It may not work if injected elsewhere.  · To prevent scarring, inject in a different spot each time.  · Dont use this treatment if you have a bleeding disorder or any risk of infection.  · Get medical help right away if your erection lasts longer than 3 to 4 hours.  Work with your healthcare provider  Ask how often you can safely repeat injections, as well as any other questions you have. You and your healthcare provider will talk about follow-up exams and how to get supplies. If the medicine doesnt work or stops working over time, tell your healthcare provider.     When to call your healthcare provider  Call your healthcare provider right away if any of these occur:  · An erection that lasts longer than 3 to 4  hours  · Bleeding or bruising  · Severe pain  · Scarring or curvature of the penis   Date Last Reviewed: 1/1/2017  © 2647-6914 [employer]. [address] [place] [address]. All rights reserved. This information is not intended as a substitute for professional medical care. Always follow your healthcare professional's instructions.        Penile Self-Injection Procedure  Self-injection is a good option if you have erectile dysfunction (ED). You insert a tiny needle into your penis and inject a medicine. This helps your penis get hard and stay that way long enough for sex. And sex and orgasm will feel as good as always. You may be nervous about doing self-injection at first. But with practice, it will get easier. Your healthcare provider will show you how to do self-injection the first time.  Talk to your doctor  about any medicines you take and any medical problems you have.      Preparing for injection  · Wash your hands well with soap and water.  · Prepare the medicine (if needed).  · Sit or  a comfortable position in a warm, well-lit room. If you need to, sit or  front of a mirror.  · Find an injection site on one side of your penis, in a place with no visible veins. (Dont inject into the top, bottom, or head of the penis.)  · Clean the injection site with an alcohol swab. Grasp the head of your penis firmly with your thumb and forefinger (dont just pinch the skin). Stretch the penis so the skin on the shaft is taut.  Injecting the medicine  · Rest your penis against your inner thigh and pull it gently toward your knee. Dont twist or rotate it. This way youll be sure to inject the medicine into the spot you chose and cleaned before.  · Hold the syringe between your thumb and fingers, like youre holding a pen. Rest your forearm on your thigh for support.  · Insert the needle at a 90° angle (perpendicular) to the shaft. Do this quickly to reduce discomfort. (The needle should go in easily. If it doesnt, stop right away.)  · Move your thumb to the plunger. Press down to inject the medicine, counting to 5.  · Remove the needle and dispose of it safely.  Gaining an erection  · Apply pressure to the injection site for a few minutes. This prevents swelling and bruising and helps spread the medicine.   · Stand up. This may help your erection develop. Foreplay often helps, too.  · Your penis should become firm within 10 to 20 minutes. The erection will last long enough for sex, and maybe longer.  When to seek medical care  An erection that lasts longer than 3 to 4  hours  · Bleeding or bruising  · Severe pain  · Scarring or curvature of the penis   Date Last Reviewed: 1/7/2017  © 6234-5398 [employer]. [address] [place] [address], [place] [address]. All rights reserved. This information is not  intended as a substitute for professional medical care. Always follow your healthcare professional's instructions.

## 2018-06-21 ENCOUNTER — TELEPHONE (OUTPATIENT)
Dept: INTERNAL MEDICINE | Facility: CLINIC | Age: 53
End: 2018-06-21

## 2018-06-21 ENCOUNTER — LAB VISIT (OUTPATIENT)
Dept: LAB | Facility: HOSPITAL | Age: 53
End: 2018-06-21
Attending: INTERNAL MEDICINE
Payer: COMMERCIAL

## 2018-06-21 DIAGNOSIS — Z11.4 SCREENING FOR HIV (HUMAN IMMUNODEFICIENCY VIRUS): ICD-10-CM

## 2018-06-21 DIAGNOSIS — Z11.4 SCREENING FOR HIV (HUMAN IMMUNODEFICIENCY VIRUS): Primary | ICD-10-CM

## 2018-06-21 PROCEDURE — 86703 HIV-1/HIV-2 1 RESULT ANTBDY: CPT

## 2018-06-21 PROCEDURE — 36415 COLL VENOUS BLD VENIPUNCTURE: CPT | Mod: PO

## 2018-06-21 NOTE — TELEPHONE ENCOUNTER
----- Message from Sherley Ybarra sent at 6/21/2018  8:54 AM CDT -----  Contact: pt [phone]  Pt would like to get information on getting tested for HIV. Please advise.

## 2018-06-22 ENCOUNTER — TELEPHONE (OUTPATIENT)
Dept: INTERNAL MEDICINE | Facility: CLINIC | Age: 53
End: 2018-06-22

## 2018-06-22 LAB — HIV 1+2 AB+HIV1 P24 AG SERPL QL IA: NEGATIVE

## 2018-08-02 ENCOUNTER — ANESTHESIA (OUTPATIENT)
Dept: ENDOSCOPY | Facility: HOSPITAL | Age: 53
End: 2018-08-02
Payer: COMMERCIAL

## 2018-08-02 ENCOUNTER — HOSPITAL ENCOUNTER (OUTPATIENT)
Facility: HOSPITAL | Age: 53
Discharge: HOME | End: 2018-08-02
Attending: COLON & RECTAL SURGERY | Admitting: COLON & RECTAL SURGERY
Payer: COMMERCIAL

## 2018-08-02 ENCOUNTER — ANESTHESIA EVENT (OUTPATIENT)
Dept: ENDOSCOPY | Facility: HOSPITAL | Age: 53
End: 2018-08-02

## 2018-08-02 ENCOUNTER — SURGERY (OUTPATIENT)
Age: 53
End: 2018-08-02

## 2018-08-02 VITALS
DIASTOLIC BLOOD PRESSURE: 70 MMHG | HEART RATE: 81 BPM | TEMPERATURE: 97 F | OXYGEN SATURATION: 100 % | SYSTOLIC BLOOD PRESSURE: 120 MMHG | BODY MASS INDEX: 26.46 KG/M2 | WEIGHT: 155 LBS | RESPIRATION RATE: 18 BRPM | HEIGHT: 64 IN

## 2018-08-02 DIAGNOSIS — Z12.11 COLON CANCER SCREENING: ICD-10-CM

## 2018-08-02 DIAGNOSIS — Z12.11 SPECIAL SCREENING FOR MALIGNANT NEOPLASMS, COLON: Primary | ICD-10-CM

## 2018-08-02 PROCEDURE — 63600175 PHARM REV CODE 636 W HCPCS: Performed by: NURSE ANESTHETIST, CERTIFIED REGISTERED

## 2018-08-02 PROCEDURE — 25000003 PHARM REV CODE 250: Performed by: NURSE PRACTITIONER

## 2018-08-02 PROCEDURE — G0121 COLON CA SCRN NOT HI RSK IND: HCPCS | Performed by: COLON & RECTAL SURGERY

## 2018-08-02 PROCEDURE — 37000008 HC ANESTHESIA 1ST 15 MINUTES: Performed by: COLON & RECTAL SURGERY

## 2018-08-02 PROCEDURE — 25000003 PHARM REV CODE 250: Performed by: INTERNAL MEDICINE

## 2018-08-02 PROCEDURE — E9220 PRA ENDO ANESTHESIA: HCPCS | Mod: ,,, | Performed by: NURSE ANESTHETIST, CERTIFIED REGISTERED

## 2018-08-02 PROCEDURE — 37000009 HC ANESTHESIA EA ADD 15 MINS: Performed by: COLON & RECTAL SURGERY

## 2018-08-02 PROCEDURE — G0121 COLON CA SCRN NOT HI RSK IND: HCPCS | Mod: ,,, | Performed by: COLON & RECTAL SURGERY

## 2018-08-02 RX ORDER — LIDOCAINE HCL/PF 100 MG/5ML
SYRINGE (ML) INTRAVENOUS
Status: DISCONTINUED | OUTPATIENT
Start: 2018-08-02 | End: 2018-08-02

## 2018-08-02 RX ORDER — PROPOFOL 10 MG/ML
VIAL (ML) INTRAVENOUS CONTINUOUS PRN
Status: DISCONTINUED | OUTPATIENT
Start: 2018-08-02 | End: 2018-08-02

## 2018-08-02 RX ORDER — SODIUM CHLORIDE 0.9 % (FLUSH) 0.9 %
3 SYRINGE (ML) INJECTION
Status: DISCONTINUED | OUTPATIENT
Start: 2018-08-02 | End: 2018-08-02 | Stop reason: HOSPADM

## 2018-08-02 RX ORDER — SODIUM CHLORIDE 9 MG/ML
INJECTION, SOLUTION INTRAVENOUS CONTINUOUS
Status: DISCONTINUED | OUTPATIENT
Start: 2018-08-02 | End: 2018-08-02 | Stop reason: HOSPADM

## 2018-08-02 RX ORDER — PROPOFOL 10 MG/ML
VIAL (ML) INTRAVENOUS
Status: DISCONTINUED | OUTPATIENT
Start: 2018-08-02 | End: 2018-08-02

## 2018-08-02 RX ADMIN — SODIUM CHLORIDE: 0.9 INJECTION, SOLUTION INTRAVENOUS at 10:08

## 2018-08-02 RX ADMIN — PROPOFOL 80 MG: 10 INJECTION, EMULSION INTRAVENOUS at 11:08

## 2018-08-02 RX ADMIN — PROPOFOL 150 MCG/KG/MIN: 10 INJECTION, EMULSION INTRAVENOUS at 11:08

## 2018-08-02 RX ADMIN — LIDOCAINE HYDROCHLORIDE 40 MG: 20 INJECTION, SOLUTION INTRAVENOUS at 11:08

## 2018-08-02 NOTE — TRANSFER OF CARE
"Anesthesia Transfer of Care Note    Patient: Roberto Edgar    Procedure(s) Performed: Procedure(s) (LRB):  COLONOSCOPY (N/A)    Patient location: PACU    Anesthesia Type: general    Post pain: adequate analgesia    Post assessment: no apparent anesthetic complications    Post vital signs: stable    Level of consciousness: awake    Nausea/Vomiting: no nausea/vomiting    Complications: none    Transfer of care protocol was followed      Last vitals:   Visit Vitals  /79 (BP Location: Left arm, Patient Position: Lying)   Pulse 78   Temp 37.1 °C (98.8 °F) (Temporal)   Resp 16   Ht 5' 4" (1.626 m)   Wt 70.3 kg (155 lb)   SpO2 97%   BMI 26.61 kg/m²     "

## 2018-08-02 NOTE — ANESTHESIA POSTPROCEDURE EVALUATION
"Anesthesia Post Evaluation    Patient: Roberto Edgar    Procedure(s) Performed: Procedure(s) (LRB):  COLONOSCOPY (N/A)    Final Anesthesia Type: general  Patient location during evaluation: PACU  Patient participation: Yes- Able to Participate  Level of consciousness: awake and alert  Post-procedure vital signs: reviewed and stable  Pain management: adequate  Airway patency: patent  PONV status at discharge: No PONV  Anesthetic complications: no      Cardiovascular status: hemodynamically stable and blood pressure returned to baseline  Respiratory status: unassisted and spontaneous ventilation  Hydration status: euvolemic  Follow-up not needed.        Visit Vitals  /70 (BP Location: Left arm, Patient Position: Sitting)   Pulse 81   Temp 36.2 °C (97.1 °F)   Resp 18   Ht 5' 4" (1.626 m)   Wt 70.3 kg (155 lb)   SpO2 100%   BMI 26.61 kg/m²       Pain/Jalil Score: Pain Assessment Performed: Yes (8/2/2018 11:34 AM)  Presence of Pain: denies (8/2/2018 12:04 PM)  Jalil Score: 10 (8/2/2018 12:04 PM)      "

## 2018-08-02 NOTE — [employer] PATIENT INSTRUCTIONS
Discharge Summary/Instructions after an Endoscopic Procedure  Patient Name: Roberto Edgar  Patient MRN: [id number]  Patient YOB: 1965  Thursday, August 02, 2018  Saeid Velazquez MD  RESTRICTIONS:  During your procedure today, you received medications for sedation.  These   medications may affect your judgment, balance and coordination.  Therefore,   for 24 hours, you have the following restrictions:   - DO NOT drive a car, operate machinery, make legal/financial decisions,   sign important papers or drink alcohol.    ACTIVITY:  Today: no heavy lifting, straining or running due to procedural   sedation/anesthesia.  The following day: return to full activity including work.  DIET:  Eat and drink normally unless instructed otherwise.     TREATMENT FOR COMMON SIDE EFFECTS:  - Mild abdominal pain, nausea, belching, bloating or excessive gas:  rest,   eat lightly and use a heating pad.  - Sore Throat: treat with throat lozenges and/or gargle with warm salt   water.  - Because air was used during the procedure, expelling large amounts of air   from your rectum or belching is normal.  - If a bowel prep was taken, you may not have a bowel movement for 1-3 days.    This is normal.  SYMPTOMS TO WATCH FOR AND REPORT TO YOUR PHYSICIAN:  1. Abdominal pain or bloating, other than gas cramps.  2. Chest pain.  3. Back pain.  4. Signs of infection such as: chills or fever occurring within 24 hours   after the procedure.  5. Rectal bleeding, which would show as bright red, maroon, or black stools.   (A tablespoon of blood from the rectum is not serious, especially if   hemorrhoids are present.)  6. Vomiting.  7. Weakness or dizziness.  GO DIRECTLY TO THE NEAREST EMERGENCY ROOM IF YOU HAVE ANY OF THE FOLLOWING:      Difficulty breathing              Chills and/or fever over 101 F   Persistent vomiting and/or vomiting blood   Severe abdominal pain   Severe chest pain   Black, tarry stools   Bleeding- more than one  tablespoon   Any other symptom or condition that you feel may need urgent attention  Your doctor recommends these additional instructions:  If any biopsies were taken, your doctors clinic will contact you in 1 to 2   weeks with any results.  - Discharge patient to home (ambulatory).   - Resume previous diet indefinitely.   - Continue present medications.   - Repeat colonoscopy in 10 years for screening purposes.  For questions, problems or results please call your physician - Saeid Velazquez MD at Work:  [phone].  [facility], EMERGENCY ROOM PHONE NUMBER: [phone]  IF A COMPLICATION OR EMERGENCY SITUATION ARISES AND YOU ARE UNABLE TO REACH   YOUR PHYSICIAN - GO DIRECTLY TO THE EMERGENCY ROOM.  Saeid Velazquez MD  8/2/2018 11:31:20 AM  This report has been verified and signed electronically.  [employer]

## 2018-08-02 NOTE — H&P
Endoscopy H&P    Procedure : Colonoscopy      asymptomatic screening exam      Past Medical History:   Diagnosis Date    Hypertension     Shoulder pain, bilateral        Family History   Problem Relation Age of Onset    Hypertension Mother     Diabetes Father     Cancer Father         prostate ca    Cancer Paternal Uncle         prostate ca    Cancer Maternal Grandfather         prostate ca    Glaucoma Neg Hx     Macular degeneration Neg Hx     Retinal detachment Neg Hx     Strabismus Neg Hx     Cataracts Neg Hx     Amblyopia Neg Hx     Blindness Neg Hx        Social History     Social History    Marital status:      Spouse name: N/A    Number of children: N/A    Years of education: N/A     Occupational History    Not on file.     Social History Main Topics    Smoking status: Never Smoker    Smokeless tobacco: Never Used    Alcohol use Yes      Comment: once in a while    Drug use: No    Sexual activity: Not Currently     Other Topics Concern    Not on file     Social History Narrative    No narrative on file       Review of Systems:  Respiratory ROS: no cough, shortness of breath, or wheezing  Cardiovascular ROS: no chest pain or dyspnea on exertion  Gastrointestinal ROS: no abdominal pain, change in bowel habits, or black or bloody stools  Musculoskeletal ROS: negative  Neurological ROS: no TIA or stroke symptoms        Physical Exam:  General: no distress  Head: normocephalic  Neck: supple, symmetrical, trachea midline  Lungs:  clear to auscultation bilaterally and normal respiratory effort  Heart: regular rate and rhythm, S1, S2 normal, no murmur, rub or gallop  Abdomen: soft, non-tender non-distented; bowel sounds normal; no masses,  no organomegaly  Extremities: no cyanosis or edema, or clubbing       Deep Sedation: Mallampati Score II (hard and soft palate, upper portion of tonsils anduvula  visible)    II    Assessment and Plan:  Proceed with Colonoscopy

## 2018-08-02 NOTE — ANESTHESIA PREPROCEDURE EVALUATION
08/02/2018  Roberto Edgar is a 53 y.o., male.    Anesthesia Evaluation    I have reviewed the Patient Summary Reports.     I have reviewed the Medications.     Review of Systems  Anesthesia Hx:  Denies Hx of Anesthetic complications  Denies Family Hx of Anesthesia complications.   Denies Personal Hx of Anesthesia complications.   Hematology/Oncology:  Hematology Normal   Oncology Normal     EENT/Dental:EENT/Dental Normal   Cardiovascular:   Hypertension, well controlled    Pulmonary:  Pulmonary Normal    Renal/:  Renal/ Normal     Hepatic/GI:  Hepatic/GI Normal    Musculoskeletal:  Musculoskeletal Normal    Neurological:  Neurology Normal    Endocrine:  Endocrine Normal    Dermatological:  Skin Normal    Psych:  Psychiatric Normal           Physical Exam  General:  Well nourished    Airway/Jaw/Neck:  Airway Findings: Mouth Opening: Normal Tongue: Normal  General Airway Assessment: Adult  TM Distance: Normal, at least 6 cm       Chest/Lungs:  Chest/Lungs Clear    Heart/Vascular:  Heart Findings: Normal Heart murmur: negative Vascular Findings: Normal            Anesthesia Plan  Type of Anesthesia, risks & benefits discussed:  Anesthesia Type:  general  Patient's Preference:   Intra-op Monitoring Plan:   Intra-op Monitoring Plan Comments:   Post Op Pain Control Plan:   Post Op Pain Control Plan Comments:   Induction:   IV  Beta Blocker:  Patient is not currently on a Beta-Blocker (No further documentation required).       Informed Consent: Patient understands risks and agrees with Anesthesia plan.  Questions answered. Anesthesia consent signed with patient.  ASA Score: 2     Day of Surgery Review of History & Physical: I have interviewed and examined the patient. I have reviewed the patient's H&P dated: 8/2/2018.   H&P update referred to the surgeon.         Ready For Surgery From Anesthesia Perspective.

## 2018-08-02 NOTE — PLAN OF CARE
Pt verbalized understanding of discharge instructions including diet, s/s to notify md, and follow up. Pt refused wheelchair and will ambulate out of the unit with his uncle.

## 2018-08-09 ENCOUNTER — TELEPHONE (OUTPATIENT)
Dept: ENDOSCOPY | Facility: HOSPITAL | Age: 53
End: 2018-08-09

## 2018-08-29 ENCOUNTER — OFFICE VISIT (OUTPATIENT)
Dept: UROLOGY | Facility: CLINIC | Age: 53
End: 2018-08-29
Payer: COMMERCIAL

## 2018-08-29 VITALS
DIASTOLIC BLOOD PRESSURE: 79 MMHG | WEIGHT: 159.63 LBS | BODY MASS INDEX: 27.25 KG/M2 | HEIGHT: 64 IN | HEART RATE: 73 BPM | SYSTOLIC BLOOD PRESSURE: 122 MMHG

## 2018-08-29 DIAGNOSIS — N40.1 BPH WITH URINARY OBSTRUCTION: ICD-10-CM

## 2018-08-29 DIAGNOSIS — I10 ESSENTIAL HYPERTENSION: ICD-10-CM

## 2018-08-29 DIAGNOSIS — N52.01 ERECTILE DYSFUNCTION DUE TO ARTERIAL INSUFFICIENCY: Primary | ICD-10-CM

## 2018-08-29 DIAGNOSIS — N13.8 BPH WITH URINARY OBSTRUCTION: ICD-10-CM

## 2018-08-29 PROBLEM — Z12.11 COLON CANCER SCREENING: Status: RESOLVED | Noted: 2018-08-02 | Resolved: 2018-08-29

## 2018-08-29 PROCEDURE — 3008F BODY MASS INDEX DOCD: CPT | Mod: CPTII,S$GLB,, | Performed by: UROLOGY

## 2018-08-29 PROCEDURE — 99213 OFFICE O/P EST LOW 20 MIN: CPT | Mod: S$GLB,,, | Performed by: UROLOGY

## 2018-08-29 PROCEDURE — 3074F SYST BP LT 130 MM HG: CPT | Mod: CPTII,S$GLB,, | Performed by: UROLOGY

## 2018-08-29 PROCEDURE — 3078F DIAST BP <80 MM HG: CPT | Mod: CPTII,S$GLB,, | Performed by: UROLOGY

## 2018-08-29 PROCEDURE — 99999 PR PBB SHADOW E&M-EST. PATIENT-LVL III: CPT | Mod: PBBFAC,,, | Performed by: UROLOGY

## 2018-08-29 NOTE — PROGRESS NOTES
CHIEF COMPLAINT:    Mr. Edgar is a 53 y.o. male presenting with ED.    PRESENTING ILLNESS:    Roberto Edgar is a 53 y.o. male who c/o ED.  This has been present for > 1 year.  He has tried and failed Viagra and Cialis.  His T is normal.  He's currently using ICI with good results.  He's been using 10 units, but his erections have been lasting > 4 hours.    He has nocturia x 0-1.  Good FOS.  No hematuria.  No dysuria.    REVIEW OF SYSTEMS:    Roberto Edgar denies headache, blurred vision, fever, nausea, vomiting, chills, abdominal pain, bleeding per rectum, cough, SOB, recent loss of consciousness, recent mental status changes, seizures, dizziness, or upper or lower extremity weakness.    JUVE  1. 1  2. 1  3. 0  4. 2  5. 1     PATIENT HISTORY:    Past Medical History:   Diagnosis Date    Hypertension     Shoulder pain, bilateral        Past Surgical History:   Procedure Laterality Date    HERNIA REPAIR         Family History   Problem Relation Age of Onset    Hypertension Mother     Diabetes Father     Cancer Father         prostate ca    Cancer Paternal Uncle         prostate ca    Cancer Maternal Grandfather         prostate ca    Glaucoma Neg Hx     Macular degeneration Neg Hx     Retinal detachment Neg Hx     Strabismus Neg Hx     Cataracts Neg Hx     Amblyopia Neg Hx     Blindness Neg Hx        Social History     Socioeconomic History    Marital status:      Spouse name: Not on file    Number of children: Not on file    Years of education: Not on file    Highest education level: Not on file   Social Needs    Financial resource strain: Not on file    Food insecurity - worry: Not on file    Food insecurity - inability: Not on file    Transportation needs - medical: Not on file    Transportation needs - non-medical: Not on file   Occupational History    Not on file   Tobacco Use    Smoking status: Never Smoker    Smokeless tobacco: Never Used   Substance and Sexual Activity     Alcohol use: Yes     Comment: once in a while    Drug use: No    Sexual activity: Not Currently   Other Topics Concern    Not on file   Social History Narrative    Not on file       Allergies:  Patient has no known allergies.    Medications:    Current Outpatient Medications:     losartan (COZAAR) 100 MG tablet, Take 1 tablet (100 mg total) by mouth once daily., Disp: 90 tablet, Rfl: 3    papaverine 30 mg/mL injection, Add: Phentolamine 10 mg Add: PGE1 100 mcg  Sig:  Inject 5 units (0.05 mls) as directed, Disp: 10 mL, Rfl: 11    PHYSICAL EXAMINATION:    The patient generally appears in good health, is appropriately interactive, and is in no apparent distress.     Eyes: anicteric sclerae, moist conjunctivae; no lid-lag; PERRLA     HENT: Atraumatic; oropharynx clear with moist mucous membranes and no mucosal ulcerations;normal hard and soft palate.  No evidence of lymphadenopathy.    Neck: Trachea midline.  No thyromegaly.    Musculoskeletal: No abnormal gait.    Skin: No lesions.    Mental: Cooperative with normal affect.  Is oriented to time, place, and person.    Neuro: Grossly intact.    Chest: Normal inspiratory effort.   No accessory muscles.  No audible wheezes.  Respirations symmetric on inspiration and expiration.    Heart: Regular rhythm.      Abdomen:  Soft, non-tender. No masses or organomegaly. Bladder is not palpable. No evidence of flank discomfort. No evidence of inguinal hernia.    Genitourinary: The penis is circumcised with no evidence of plaques or induration. The urethral meatus is normal. The testes, epididymides, and cord structures are normal in size and contour bilaterally. The scrotum is normal in size and contour.    Normal anal sphincter tone. No rectal mass.    The prostate is 40 g. Normal landmarks. Lateral sulci. Median furrow intact.  No nodularity or induration. Seminal vesicles are normal.    Extremities: No clubbing, cyanosis, or edema      LABS:      Lab Results   Component  Value Date    PSA 2.4 10/27/2017       IMPRESSION:    Encounter Diagnoses   Name Primary?    Erectile dysfunction due to arterial insufficiency Yes    BPH with urinary obstruction     Essential hypertension    HTN, controlled      PLAN:    1. Discussed options for his ED.  Continue ICI.  Discussed that he should decrease to prevent erections near 4 hours in duration.  He'll start with 5 units.  2. Will observe his LUTS as they don't bother him.   3. RTC 1 year to see an RICHARD.    Copy to:

## 2019-04-16 DIAGNOSIS — I10 ESSENTIAL HYPERTENSION: ICD-10-CM

## 2019-04-18 RX ORDER — LOSARTAN POTASSIUM 100 MG/1
100 TABLET ORAL DAILY
Qty: 30 TABLET | Refills: 0 | Status: SHIPPED | OUTPATIENT
Start: 2019-04-18 | End: 2019-04-30 | Stop reason: SDUPTHER

## 2019-04-18 NOTE — TELEPHONE ENCOUNTER
Refill request for Dr. Amos pt.   I am trying to get in touch with pt re: needing an annual physical appt & changing [facility]PC.

## 2019-04-22 ENCOUNTER — TELEPHONE (OUTPATIENT)
Dept: INTERNAL MEDICINE | Facility: CLINIC | Age: 54
End: 2019-04-22

## 2019-04-22 NOTE — TELEPHONE ENCOUNTER
[facility] re: needs a new PCP &  An appt for an annual physical w/ labs

## 2019-04-30 ENCOUNTER — OFFICE VISIT (OUTPATIENT)
Dept: INTERNAL MEDICINE | Facility: CLINIC | Age: 54
End: 2019-04-30
Payer: COMMERCIAL

## 2019-04-30 VITALS
HEIGHT: 64 IN | TEMPERATURE: 98 F | WEIGHT: 158.31 LBS | SYSTOLIC BLOOD PRESSURE: 112 MMHG | RESPIRATION RATE: 16 BRPM | HEART RATE: 80 BPM | DIASTOLIC BLOOD PRESSURE: 78 MMHG | BODY MASS INDEX: 27.03 KG/M2

## 2019-04-30 DIAGNOSIS — I10 ESSENTIAL HYPERTENSION: ICD-10-CM

## 2019-04-30 DIAGNOSIS — Z00.00 ANNUAL PHYSICAL EXAM: Primary | ICD-10-CM

## 2019-04-30 PROCEDURE — 3074F PR MOST RECENT SYSTOLIC BLOOD PRESSURE < 130 MM HG: ICD-10-PCS | Mod: CPTII,S$GLB,, | Performed by: HOSPITALIST

## 2019-04-30 PROCEDURE — 99396 PREV VISIT EST AGE 40-64: CPT | Mod: S$GLB,,, | Performed by: HOSPITALIST

## 2019-04-30 PROCEDURE — 3074F SYST BP LT 130 MM HG: CPT | Mod: CPTII,S$GLB,, | Performed by: HOSPITALIST

## 2019-04-30 PROCEDURE — 3078F DIAST BP <80 MM HG: CPT | Mod: CPTII,S$GLB,, | Performed by: HOSPITALIST

## 2019-04-30 PROCEDURE — 99999 PR PBB SHADOW E&M-EST. PATIENT-LVL III: ICD-10-PCS | Mod: PBBFAC,,, | Performed by: HOSPITALIST

## 2019-04-30 PROCEDURE — 99999 PR PBB SHADOW E&M-EST. PATIENT-LVL III: CPT | Mod: PBBFAC,,, | Performed by: HOSPITALIST

## 2019-04-30 PROCEDURE — 99396 PR PREVENTIVE VISIT,EST,40-64: ICD-10-PCS | Mod: S$GLB,,, | Performed by: HOSPITALIST

## 2019-04-30 PROCEDURE — 3078F PR MOST RECENT DIASTOLIC BLOOD PRESSURE < 80 MM HG: ICD-10-PCS | Mod: CPTII,S$GLB,, | Performed by: HOSPITALIST

## 2019-04-30 RX ORDER — LOSARTAN POTASSIUM 100 MG/1
100 TABLET ORAL DAILY
Qty: 30 TABLET | Refills: 11 | Status: SHIPPED | OUTPATIENT
Start: 2019-04-30 | End: 2020-05-05 | Stop reason: SDUPTHER

## 2019-04-30 NOTE — PROGRESS NOTES
Subjective:     @Patient ID: Roberto Edgar is a 54 y.o. male.    Chief Complaint: Medication Refill and Leg Pain (left leg pain and buttocks pain)    HPI    54 y.o. male here for annual exam and medication refill. Pt is new to me. Reports he is doing well. Works as a . Is usually physically active with running but a few months ago had left leg pain and back pain that he thinks were possibly spasm. Improved with OTC ibuprofen. Reports pain symptoms have improved currently.    Lipid disorders/ASCVD risk (ages >/= 45 or >/= 20 if increased risk ): ordered  DM (>45y yearly or if obese, HTN): A1c ordered   Hepatitis C (one time if born between 9748-5[birth year]): ordered  Eye exam: 3/2018     Colorectal Cancer (normal risk 50-75yr): Colonoscopy done   Prostate (per discussion with patient starting at 50y or 45y if high risk): ordered     Vaccines:   Influenza (yearly): declines  Tetanus (every 10 yrs - 1st tdap)  Reports done 2017  PPSV23(>64yo or <65 w/ lung dz, smoking, DM)  n/a  PCV13 (> 65 or <65 w/ immunocompromised) n/a  Zoster (>61yo) n/a     Exercise: was running.   Diet: Regular      Review of Systems   Constitutional: Negative for chills and fever.   HENT: Negative for congestion and sore throat.    Eyes: Negative for pain and visual disturbance.   Respiratory: Negative for cough and shortness of breath.    Cardiovascular: Negative for chest pain and leg swelling.   Gastrointestinal: Negative for abdominal pain, nausea and vomiting.   Endocrine: Negative for polydipsia and polyuria.   Genitourinary: Negative for difficulty urinating and dysuria.   Musculoskeletal: Negative for arthralgias and back pain.   Skin: Negative for rash and wound.   Neurological: Negative for weakness and headaches.   Psychiatric/Behavioral: Negative for agitation and confusion.     Past medical history, surgical history, and family medical history reviewed and updated as appropriate.    Medications and allergies reviewed.  "    Objective:     Vitals:    04/30/19 1620   BP: 112/78   BP Location: Right arm   Patient Position: Sitting   Pulse: 80   Resp: 16   Temp: 97.8 °F (36.6 °C)   TempSrc: Oral   Weight: 71.8 kg (158 lb 4.6 oz)   Height: 5' 4" (1.626 m)     Body mass index is 27.17 kg/m².  Physical Exam   Constitutional: He is oriented to person, place, and time. He appears well-developed. No distress.   HENT:   Head: Normocephalic and atraumatic.   Mouth/Throat: Oropharynx is clear and moist. No oropharyngeal exudate.   Eyes: Pupils are equal, round, and reactive to light. Conjunctivae are normal. Right eye exhibits no discharge. Left eye exhibits no discharge.   Neck: Normal range of motion. Neck supple.   Cardiovascular: Normal rate, regular rhythm and intact distal pulses. Exam reveals no friction rub.   No murmur heard.  Pulmonary/Chest: Effort normal and breath sounds normal.   Abdominal: Soft. Bowel sounds are normal. He exhibits no distension. There is no tenderness.   Musculoskeletal: Normal range of motion. He exhibits no edema.   Lymphadenopathy:     He has no cervical adenopathy.   Neurological: He is alert and oriented to person, place, and time.   Skin: Skin is warm and dry.   Psychiatric: He has a normal mood and affect. His behavior is normal.   Vitals reviewed.      Lab Results   Component Value Date    WBC 6.57 10/27/2017    HGB 15.1 10/27/2017    HCT 44.5 10/27/2017     10/27/2017    CHOL 163 10/27/2017    TRIG 68 10/27/2017    HDL 41 10/27/2017    ALT 29 03/12/2018    AST 24 03/12/2018     03/12/2018    K 4.1 03/12/2018     03/12/2018    CREATININE 1.1 03/12/2018    BUN 14 03/12/2018    CO2 29 03/12/2018    TSH 2.459 10/27/2017    PSA 2.4 10/27/2017    HGBA1C 5.6 03/12/2018       Assessment:     1. Annual physical exam    2. Essential hypertension      Plan:   Roberto was seen today for medication refill and leg pain.    Diagnoses and all orders for this visit:    Annual physical exam  - Pt " declines flu shot. Encouraged healthy diet. Recommend to slowly ease back into exercise regimen.   -     Comprehensive metabolic panel; Future  -     CBC auto differential; Future  -     TSH; Future  -     Vitamin D; Future  -     Lipid panel; Future  -     Hepatitis C antibody; Future  -     Urinalysis; Future  -     HEMOGLOBIN A1C; Future  -     PSA, Screening; Future    Essential hypertension  - BP well controlled   -     losartan (COZAAR) 100 MG tablet; Take 1 tablet (100 mg total) by mouth once daily.      Follow up in about 1 year (around 4/30/2020), or if symptoms worsen or fail to improve.    Coral Rodriguez MD  Internal Medicine    4/30/2019

## 2019-05-02 ENCOUNTER — LAB VISIT (OUTPATIENT)
Dept: LAB | Facility: HOSPITAL | Age: 54
End: 2019-05-02
Attending: HOSPITALIST
Payer: COMMERCIAL

## 2019-05-02 DIAGNOSIS — Z00.00 ANNUAL PHYSICAL EXAM: ICD-10-CM

## 2019-05-02 LAB
25(OH)D3+25(OH)D2 SERPL-MCNC: 34 NG/ML (ref 30–96)
ALBUMIN SERPL BCP-MCNC: 3.8 G/DL (ref 3.5–5.2)
ALP SERPL-CCNC: 78 U/L (ref 55–135)
ALT SERPL W/O P-5'-P-CCNC: 38 U/L (ref 10–44)
ANION GAP SERPL CALC-SCNC: 7 MMOL/L (ref 8–16)
AST SERPL-CCNC: 33 U/L (ref 10–40)
BASOPHILS # BLD AUTO: 0.05 K/UL (ref 0–0.2)
BASOPHILS NFR BLD: 1 % (ref 0–1.9)
BILIRUB SERPL-MCNC: 0.3 MG/DL (ref 0.1–1)
BUN SERPL-MCNC: 17 MG/DL (ref 6–20)
CALCIUM SERPL-MCNC: 9.2 MG/DL (ref 8.7–10.5)
CHLORIDE SERPL-SCNC: 110 MMOL/L (ref 95–110)
CHOLEST SERPL-MCNC: 163 MG/DL (ref 120–199)
CHOLEST/HDLC SERPL: 4.4 {RATIO} (ref 2–5)
CO2 SERPL-SCNC: 27 MMOL/L (ref 23–29)
COMPLEXED PSA SERPL-MCNC: 3.7 NG/ML (ref 0–4)
CREAT SERPL-MCNC: 1 MG/DL (ref 0.5–1.4)
DIFFERENTIAL METHOD: ABNORMAL
EOSINOPHIL # BLD AUTO: 0.1 K/UL (ref 0–0.5)
EOSINOPHIL NFR BLD: 2.7 % (ref 0–8)
ERYTHROCYTE [DISTWIDTH] IN BLOOD BY AUTOMATED COUNT: 13.2 % (ref 11.5–14.5)
EST. GFR  (AFRICAN AMERICAN): >60 ML/MIN/1.73 M^2
EST. GFR  (NON AFRICAN AMERICAN): >60 ML/MIN/1.73 M^2
ESTIMATED AVG GLUCOSE: 123 MG/DL (ref 68–131)
GLUCOSE SERPL-MCNC: 97 MG/DL (ref 70–110)
HBA1C MFR BLD HPLC: 5.9 % (ref 4–5.6)
HCT VFR BLD AUTO: 47.9 % (ref 40–54)
HCV AB SERPL QL IA: NEGATIVE
HDLC SERPL-MCNC: 37 MG/DL (ref 40–75)
HDLC SERPL: 22.7 % (ref 20–50)
HGB BLD-MCNC: 15.5 G/DL (ref 14–18)
IMM GRANULOCYTES # BLD AUTO: 0.01 K/UL (ref 0–0.04)
IMM GRANULOCYTES NFR BLD AUTO: 0.2 % (ref 0–0.5)
LDLC SERPL CALC-MCNC: 99.2 MG/DL (ref 63–159)
LYMPHOCYTES # BLD AUTO: 2.6 K/UL (ref 1–4.8)
LYMPHOCYTES NFR BLD: 54.6 % (ref 18–48)
MCH RBC QN AUTO: 29.8 PG (ref 27–31)
MCHC RBC AUTO-ENTMCNC: 32.4 G/DL (ref 32–36)
MCV RBC AUTO: 92 FL (ref 82–98)
MONOCYTES # BLD AUTO: 0.5 K/UL (ref 0.3–1)
MONOCYTES NFR BLD: 11 % (ref 4–15)
NEUTROPHILS # BLD AUTO: 1.5 K/UL (ref 1.8–7.7)
NEUTROPHILS NFR BLD: 30.5 % (ref 38–73)
NONHDLC SERPL-MCNC: 126 MG/DL
NRBC BLD-RTO: 0 /100 WBC
PLATELET # BLD AUTO: 192 K/UL (ref 150–350)
PMV BLD AUTO: 11.6 FL (ref 9.2–12.9)
POTASSIUM SERPL-SCNC: 4.4 MMOL/L (ref 3.5–5.1)
PROT SERPL-MCNC: 6.8 G/DL (ref 6–8.4)
RBC # BLD AUTO: 5.2 M/UL (ref 4.6–6.2)
SODIUM SERPL-SCNC: 144 MMOL/L (ref 136–145)
TRIGL SERPL-MCNC: 134 MG/DL (ref 30–150)
TSH SERPL DL<=0.005 MIU/L-ACNC: 2.92 UIU/ML (ref 0.4–4)
WBC # BLD AUTO: 4.8 K/UL (ref 3.9–12.7)

## 2019-05-02 PROCEDURE — 84153 ASSAY OF PSA TOTAL: CPT

## 2019-05-02 PROCEDURE — 36415 COLL VENOUS BLD VENIPUNCTURE: CPT | Mod: PO

## 2019-05-02 PROCEDURE — 85025 COMPLETE CBC W/AUTO DIFF WBC: CPT

## 2019-05-02 PROCEDURE — 80061 LIPID PANEL: CPT

## 2019-05-02 PROCEDURE — 84443 ASSAY THYROID STIM HORMONE: CPT

## 2019-05-02 PROCEDURE — 80053 COMPREHEN METABOLIC PANEL: CPT

## 2019-05-02 PROCEDURE — 86803 HEPATITIS C AB TEST: CPT

## 2019-05-02 PROCEDURE — 83036 HEMOGLOBIN GLYCOSYLATED A1C: CPT

## 2019-05-02 PROCEDURE — 82306 VITAMIN D 25 HYDROXY: CPT

## 2019-05-03 ENCOUNTER — TELEPHONE (OUTPATIENT)
Dept: INTERNAL MEDICINE | Facility: CLINIC | Age: 54
End: 2019-05-03

## 2019-05-03 PROBLEM — R73.03 PREDIABETES: Status: ACTIVE | Noted: 2019-05-03

## 2019-05-03 NOTE — TELEPHONE ENCOUNTER
----- Message from Coral Rodriguez MD sent at 5/3/2019 11:46 AM CDT -----  Please notify pt that:     - Electrolytes, kidney and liver function are normal  - CBC is normal/acceptable range; no signs of anemia  - TSH (thyroid) level is normal  - PSA (prostate) screen is negative   - Vitamin D level is normal  - Cholesterol panel is normal  - Hepatitis C and HIV are negative  - Hemoglobin A1c is mildly elevated consistent with prediabetes. To decrease risk of developing diabetes in the future recommend to limit/avoid sweets, processed foods and continue cardio exercise 30 min 4-5x/week  - Urine study is normal

## 2019-10-14 ENCOUNTER — TELEPHONE (OUTPATIENT)
Dept: INTERNAL MEDICINE | Facility: CLINIC | Age: 54
End: 2019-10-14

## 2019-10-14 NOTE — TELEPHONE ENCOUNTER
----- Message from Sirena Vila sent at 10/14/2019  2:15 PM CDT -----  Contact: self/[phone]  Pt called in regards to getting a Rx refill for     papaverine 30 mg/mL injection 10 mL 11 5/29/2018  No  Phentolamine 10 mg     [facility]  Please advise

## 2019-10-15 ENCOUNTER — CLINICAL SUPPORT (OUTPATIENT)
Dept: URGENT CARE | Facility: CLINIC | Age: 54
End: 2019-10-15
Payer: COMMERCIAL

## 2019-10-15 DIAGNOSIS — Z11.1 ENCOUNTER FOR PPD TEST: Primary | ICD-10-CM

## 2019-10-15 PROCEDURE — 86580 POCT TB SKIN TEST: ICD-10-PCS | Mod: S$GLB,,, | Performed by: INTERNAL MEDICINE

## 2019-10-15 PROCEDURE — 86580 TB INTRADERMAL TEST: CPT | Mod: S$GLB,,, | Performed by: INTERNAL MEDICINE

## 2019-10-30 ENCOUNTER — PATIENT OUTREACH (OUTPATIENT)
Dept: ADMINISTRATIVE | Facility: OTHER | Age: 54
End: 2019-10-30

## 2019-11-05 ENCOUNTER — PATIENT OUTREACH (OUTPATIENT)
Dept: ADMINISTRATIVE | Facility: OTHER | Age: 54
End: 2019-11-05

## 2019-11-11 ENCOUNTER — PATIENT OUTREACH (OUTPATIENT)
Dept: ADMINISTRATIVE | Facility: OTHER | Age: 54
End: 2019-11-11

## 2019-11-19 ENCOUNTER — PATIENT OUTREACH (OUTPATIENT)
Dept: ADMINISTRATIVE | Facility: OTHER | Age: 54
End: 2019-11-19

## 2020-01-03 ENCOUNTER — PATIENT OUTREACH (OUTPATIENT)
Dept: ADMINISTRATIVE | Facility: OTHER | Age: 55
End: 2020-01-03

## 2020-01-03 ENCOUNTER — OFFICE VISIT (OUTPATIENT)
Dept: UROLOGY | Facility: CLINIC | Age: 55
End: 2020-01-03
Payer: COMMERCIAL

## 2020-01-03 VITALS
HEART RATE: 111 BPM | DIASTOLIC BLOOD PRESSURE: 83 MMHG | BODY MASS INDEX: 28.51 KG/M2 | SYSTOLIC BLOOD PRESSURE: 121 MMHG | HEIGHT: 64 IN | WEIGHT: 167 LBS

## 2020-01-03 DIAGNOSIS — Z80.42 FAMILY HISTORY OF PROSTATE CANCER IN FATHER: ICD-10-CM

## 2020-01-03 DIAGNOSIS — N52.01 ERECTILE DYSFUNCTION DUE TO ARTERIAL INSUFFICIENCY: Primary | ICD-10-CM

## 2020-01-03 DIAGNOSIS — Z12.5 SCREENING FOR PROSTATE CANCER: ICD-10-CM

## 2020-01-03 LAB
BILIRUB SERPL-MCNC: NORMAL MG/DL
BLOOD URINE, POC: NORMAL
COLOR, POC UA: YELLOW
GLUCOSE UR QL STRIP: NORMAL
KETONES UR QL STRIP: NORMAL
LEUKOCYTE ESTERASE URINE, POC: NORMAL
NITRITE, POC UA: NORMAL
PH, POC UA: 7
PROTEIN, POC: NORMAL
SPECIFIC GRAVITY, POC UA: 1
UROBILINOGEN, POC UA: NORMAL

## 2020-01-03 PROCEDURE — 99214 PR OFFICE/OUTPT VISIT, EST, LEVL IV, 30-39 MIN: ICD-10-PCS | Mod: S$GLB,,, | Performed by: NURSE PRACTITIONER

## 2020-01-03 PROCEDURE — 99999 PR PBB SHADOW E&M-EST. PATIENT-LVL III: ICD-10-PCS | Mod: PBBFAC,,, | Performed by: NURSE PRACTITIONER

## 2020-01-03 PROCEDURE — 81002 URINALYSIS NONAUTO W/O SCOPE: CPT | Mod: PBBFAC | Performed by: NURSE PRACTITIONER

## 2020-01-03 PROCEDURE — 99999 PR PBB SHADOW E&M-EST. PATIENT-LVL III: CPT | Mod: PBBFAC,,, | Performed by: NURSE PRACTITIONER

## 2020-01-03 PROCEDURE — 99213 OFFICE O/P EST LOW 20 MIN: CPT | Mod: PBBFAC | Performed by: NURSE PRACTITIONER

## 2020-01-03 PROCEDURE — 99214 OFFICE O/P EST MOD 30 MIN: CPT | Mod: S$GLB,,, | Performed by: NURSE PRACTITIONER

## 2020-01-03 RX ORDER — PAPAVERINE HYDROCHLORIDE 30 MG/ML
INJECTION INTRAMUSCULAR; INTRAVENOUS
Qty: 10 ML | Refills: 11 | Status: SHIPPED | OUTPATIENT
Start: 2020-01-03 | End: 2020-02-17 | Stop reason: SDUPTHER

## 2020-01-03 NOTE — PROGRESS NOTES
CHIEF COMPLAINT:    Mr. Edgar is a 54 y.o. male presenting for f/u ED.    PRESENTING ILLNESS:    Roberto Edgar is a 54 y.o. male new patient to me (records of past medical, family and social history personally reviewed by me), w/ h/o ED.    Pt last seen in clinic 8/29/18 w/ Dr. Candelario for ED. Recommended to continue w/ ICI w/ Trimix, but at 5 units, as pt was having erections lasting >4 hrs.    Today pt presents to clinic requesting refill for Trimix. Reports having desirable results w/ 5 units. Denies adv effects. Denies penile pain, development of penile nodules/curvature. Denies GH, dysuria, irritative/obstructive urinary sxs.  Positive family h/o prostate CA in father (dx'd at ~65), paternal GF, and paternal uncle.    UA dip in clinic today unremarkable.    REVIEW OF SYSTEMS:    Roberto Edgar denies headache, blurred vision, fever, nausea, vomiting, chills, abdominal pain, pelvic pain, flank pain, bleeding per rectum, cough, SOB, recent loss of consciousness, recent mental status changes, seizures, dizziness, or upper or lower extremity weakness.    PATIENT HISTORY:    Past Medical History:   Diagnosis Date    Hypertension     Shoulder pain, bilateral        Past Surgical History:   Procedure Laterality Date    COLONOSCOPY N/A 8/2/2018    Procedure: COLONOSCOPY;  Surgeon: Saeid Velazquez MD;  Location: [address]);  Service: Endoscopy;  Laterality: N/A;    HERNIA REPAIR         Family History   Problem Relation Age of Onset    Hypertension Mother     Diabetes Father     Cancer Father         prostate ca    Cancer Paternal Uncle         prostate ca    Cancer Maternal Grandfather         prostate ca    Glaucoma Neg Hx     Macular degeneration Neg Hx     Retinal detachment Neg Hx     Strabismus Neg Hx     Cataracts Neg Hx     Amblyopia Neg Hx     Blindness Neg Hx        Social History     Socioeconomic History    Marital status:      Spouse name: Not on file    Number of  children: 1    Years of education: Not on file    Highest education level: Not on file   Occupational History    Occupation:    Social Needs    Financial resource strain: Not on file    Food insecurity:     Worry: Not on file     Inability: Not on file    Transportation needs:     Medical: Not on file     Non-medical: Not on file   Tobacco Use    Smoking status: Never Smoker    Smokeless tobacco: Never Used   Substance and Sexual Activity    Alcohol use: Yes     Comment: once in a while    Drug use: No    Sexual activity: Not Currently   Lifestyle    Physical activity:     Days per week: Not on file     Minutes per session: Not on file    Stress: Not on file   Relationships    Social connections:     Talks on phone: Not on file     Gets together: Not on file     Attends [employer] service: Not on file     Active member of club or organization: Not on file     Attends meetings of clubs or organizations: Not on file     Relationship status: Not on file   Other Topics Concern    Not on file   Social History Narrative    Not on file       Allergies:  Patient has no known allergies.    Medications:    Current Outpatient Medications:     losartan (COZAAR) 100 MG tablet, Take 1 tablet (100 mg total) by mouth once daily., Disp: 30 tablet, Rfl: 11    papaverine 30 mg/mL injection, Add: Phentolamine 10 mg Add: PGE1 100 mcg  Sig:  Inject 5 units (0.05 mls) as directed, Disp: 10 mL, Rfl: 11    PHYSICAL EXAMINATION:    The patient generally appears in good health, is appropriately interactive, and is in no apparent distress.     Eyes: anicteric sclerae, moist conjunctivae; no lid-lag; PERRLA     HENT: Atraumatic; oropharynx clear with moist mucous membranes and no mucosal ulcerations;normal hard and soft palate. No evidence of lymphadenopathy.    Neck: Trachea midline.  No thyromegaly.    Musculoskeletal: No abnormal gait.    Skin: No lesions.    Mental: Cooperative with normal affect.  Is oriented to  time, place, and person.    Neuro: Grossly intact.    Chest: Normal inspiratory effort.   No accessory muscles.  No audible wheezes.  Respirations symmetric on inspiration and expiration.    Heart: Regular rhythm.      Abdomen:  Soft, non-tender. No masses or organomegaly. No evidence of flank discomfort.    No penile discharge. Meatus w/o stenosis or lesions. Phallus unremarkable. No scrotal enlargement/edema/swelling/lesions. Epididymis unremarkable bilaterally. Testicles normal w/o nodules or tenderness. No inguinal hernias or lymphadenopathy.    The prostate is smooth, symmetrical, without nodule or induration. The seminal vesicles were normal and nonpalpable. The prostate was not tender or boggy. Rectal tone was normal no rectal mass was palpable.    Extremities: No clubbing, cyanosis, or edema.    LABS:    Lab Results   Component Value Date    CREATININE 1.0 05/02/2019    CREATININE 1.1 03/12/2018    CREATININE 1.1 10/27/2017       Lab Results   Component Value Date    PSA 3.7 05/02/2019    PSA 2.4 10/27/2017       Hemoglobin A1C   Date Value Ref Range Status   05/02/2019 5.9 (H) 4.0 - 5.6 % Final     Comment:     ADA Screening Guidelines:  5.7-6.4%  Consistent with prediabetes  >or=6.5%  Consistent with diabetes  High levels of fetal hemoglobin interfere with the HbA1C  assay. Heterozygous hemoglobin variants (HbS, HgC, etc)do  not significantly interfere with this assay.   However, presence of multiple variants may affect accuracy.     03/12/2018 5.6 4.0 - 5.6 % Final     Comment:     According to ADA guidelines, hemoglobin A1c <7.0% represents  optimal control in non-pregnant diabetic patients. Different  metrics may apply to specific patient populations.   Standards of Medical Care in Diabetes-2016.  For the purpose of screening for the presence of diabetes:  <5.7%     Consistent with the absence of diabetes  5.7-6.4%  Consistent with increasing risk for diabetes   (prediabetes)  >or=6.5%  Consistent with  diabetes  Currently, no consensus exists for use of hemoglobin A1c  for diagnosis of diabetes for children.  This Hemoglobin A1c assay has significant interference with fetal   hemoglobin   (HbF). The results are invalid for patients with abnormal amounts of   HbF,   including those with known Hereditary Persistence   of Fetal Hemoglobin. Heterozygous hemoglobin variants (HbAS, HbAC,   HbAD, HbAE, HbA2) do not significantly interfere with this assay;   however, presence of multiple variants in a sample may impact the %   interference.     10/27/2017 6.0 (H) 4.0 - 5.6 % Final     Comment:     According to ADA guidelines, hemoglobin A1c <7.0% represents  optimal control in non-pregnant diabetic patients. Different  metrics may apply to specific patient populations.   Standards of Medical Care in Diabetes-2016.  For the purpose of screening for the presence of diabetes:  <5.7%     Consistent with the absence of diabetes  5.7-6.4%  Consistent with increasing risk for diabetes   (prediabetes)  >or=6.5%  Consistent with diabetes  Currently, no consensus exists for use of hemoglobin A1c  for diagnosis of diabetes for children.  This Hemoglobin A1c assay has significant interference with fetal   hemoglobin   (HbF). The results are invalid for patients with abnormal amounts of   HbF,   including those with known Hereditary Persistence   of Fetal Hemoglobin. Heterozygous hemoglobin variants (HbAS, HbAC,   HbAD, HbAE, HbA2) do not significantly interfere with this assay;   however, presence of multiple variants in a sample may impact the %   interference.       Lab Results   Component Value Date    TOTALTESTOST 408 05/24/2018      No results found for: LABURIN    IMPRESSION:    Family history of prostate cancer in father  -     POCT urine dipstick without microscope  -     Prostate Specific Antigen, Diagnostic; Future; Expected date: 07/03/2020    Erectile dysfunction due to arterial insufficiency    Screening for prostate  cancer  -     POCT urine dipstick without microscope  -     Prostate Specific Antigen, Diagnostic; Future; Expected date: 07/03/2020      PLAN:    I spent 25 minutes with the patient of which more than half was spent in direct consultation with the patient in regards to our treatment and plan.      Discussed and reviewed intracavernosal injections using Trimix (PGE1/papaverine/phentolamine). Discussed and reviewed proper use and potential adverse effects (bleeding, pain, formation of scar tissue/nodules, development of penile curvature). May continue w/ 5 units. Rx renewed sent to [facility] per pt request. Reviewed proper storage. May use Trimix 3-4 days/week. Discussed and reviewed the importance of avoiding overdosing due to risk of priapism. Discussed and advised to report to local emergency dept for erections lasting for more than 4 hours.    Discussed and reviewed past and most recent PSA value, risen from 2017. Recommend close f/u given pt's positive family hx. Discussed and reviewed prostate CA screening w/ PSA, and [facility]ANDON. Reviewed benefits, potential burdens, risks and/or harms related to screening. Pt amenable to continue w/ f/u as discussed and scheduled.    Education sheets provided.    Follow up in about 6 months (around 7/3/2020) for w/ PSA.    Pt expressed understanding and agree w/ plan.

## 2020-01-03 NOTE — PATIENT INSTRUCTIONS
Penile Self-Injection Procedure  Self-injection is a good option if you have erectile dysfunction (ED). You insert a tiny needle into your penis and inject a medicine. This helps your penis get hard and stay that way long enough for sex. And sex and orgasm will feel as good as always. You may be nervous about doing self-injection at first. But with practice, it will get easier. Your healthcare provider will show you how to do self-injection the first time.  Talk to your doctor about any medicines you take and any medical problems you have.      Preparing for injection  · Wash your hands well with soap and water.  · Prepare the medicine (if needed).  · Sit or  a comfortable position in a warm, well-lit room. If you need to, sit or  front of a mirror.  · Find an injection site on one side of your penis, in a place with no visible veins. (Dont inject into the top, bottom, or head of the penis.)  · Clean the injection site with an alcohol swab. Grasp the head of your penis firmly with your thumb and forefinger (dont just pinch the skin). Stretch the penis so the skin on the shaft is taut.  Injecting the medicine  · Rest your penis against your inner thigh and pull it gently toward your knee. Dont twist or rotate it. This way youll be sure to inject the medicine into the spot you chose and cleaned before.  · Hold the syringe between your thumb and fingers, like youre holding a pen. Rest your forearm on your thigh for support.  · Insert the needle at a 90° angle (perpendicular) to the shaft. Do this quickly to reduce discomfort. (The needle should go in easily. If it doesnt, stop right away.)  · Move your thumb to the plunger. Press down to inject the medicine, counting to 5.  · Remove the needle and dispose of it safely.  Gaining an erection  · Apply pressure to the injection site for a few minutes. This prevents swelling and bruising and helps spread the medicine.   · Stand up. This may help your  erection develop. Foreplay often helps, too.  · Your penis should become firm within 10 to 20 minutes. The erection will last long enough for sex, and maybe longer.  When to seek medical care  An erection that lasts longer than 3 to 4  hours  · Bleeding or bruising  · Severe pain  · Scarring or curvature of the penis   Date Last Reviewed: 1/7/2017  © 2967-1678 [employer]. [address] [place] [address], [place] [address]. All rights reserved. This information is not intended as a substitute for professional medical care. Always follow your healthcare professional's instructions.        Penile Self-Injection: Notes and Precautions     Man and healthcare provider sitting across from one another, talking.   Penile self-injection is a simple technique that may improve your sex life. Some men even find that self-injection leads to an increase in natural erections. If you have questions or concerns about self-injection or erectile dysfunction (ED), talk with your healthcare provider. The information on this sheet will help you get the best results.  Notes about penile self-injection  · You may feel a mild burning during injection. This is OK. But if you feel pressure or severe pain, stop the injection. There may be a problem with the injection site.  · Only inject the medicine on the side of your penis. It may not work if injected elsewhere.  · To prevent scarring, inject in a different spot each time.  · Dont use this treatment if you have a bleeding disorder or any risk of infection.  · Get medical help right away if your erection lasts longer than 3 to 4 hours.  Work with your healthcare provider  Ask how often you can safely repeat injections, as well as any other questions you have. You and your healthcare provider will talk about follow-up exams and how to get supplies. If the medicine doesnt work or stops working over time, tell your healthcare provider.     When to call your healthcare provider  Call your  healthcare provider right away if any of these occur:  · An erection that lasts longer than 3 to 4  hours  · Bleeding or bruising  · Severe pain  · Scarring or curvature of the penis   Date Last Reviewed: 1/1/2017 © 2000-2017 [employer]. [address] [place] [address]. All rights reserved. This information is not intended as a substitute for professional medical care. Always follow your healthcare professional's instructions.        Understanding Erectile Dysfunction    Erectile dysfunction (ED) is a problem getting an erection firm enough or keeping it long enough for intercourse. The problem can happen to any man at any age. But health problems that can lead to ED become more common as a man ages. Up to half of men over age 40 experience ED at some point.  Causes of ED  ED can have many causes. Most are physical. Some are emotional issues. Often, a combination of causes is involved. Causes of ED may include:  · Medical conditions such as diabetes or depression  · Smoking tobacco or marijuana  · Drinking too much alcohol  · Side effects of medications  · Injury to nerves or blood vessels  · Emotional issues such as stress or relationship problems  ED can be treated  Prescription medications for ED are available. They help many men who try them. Depending upon the cause of the ED, though, medications may not be enough. In these cases, other treatment options are available. These include erectile aids and surgery. Your health care provider can tell you more about the treatment that is right for you. And new treatments for ED are being studied. No matter what the treatment you decide on, stay in touch with your doctor. If your symptoms persist, he or she may be able to adjust your current treatment or try something new.  Date Last Reviewed: 1/1/2017 © 2000-2017 [employer]. [address] [place] [address]. All rights reserved. This information is not intended as a  substitute for professional medical care. Always follow your healthcare professional's instructions.        Prostate-Specific Antigen (PSA)  Does this test have other names?  PSA  What is this test?  This test measures the level of prostate-specific antigen (PSA) in your blood.  The cells of the prostate gland make the protein called PSA. Men normally have low levels of PSA. If your PSA levels start to rise, it could mean you have prostate cancer, benign prostate conditions, inflammation, or an infection.  PSA testing is controversial because the U.S. Preventative Services Task Force discourages men who don't have any symptoms of prostate cancer from being screened. The task force says that PSA test results can lead to treating small cancers that would never become life-threatening.  But the American Cancer Society believes men should be told the risks and benefits of PSA testing and allowed to make their own decision about if and when to be screened.  The American Urologic Society says that PSA screening is most important between the ages of 55 and 69. If you have a brother or father with prostate cancer or you are , you should start testing at age 40.   Why do I need this test?  You may have this test if you are 50 or older and your healthcare provider wants to screen you for prostate cancer. Some providers recommend screening at age 40 or 45 if you have a family history of prostate cancer or other risk factors.  You may also have this test if you have already been diagnosed with prostate cancer, so that your healthcare provider can monitor your treatment and see whether your cancer has come back.  What other tests might I have along with this test?  Your healthcare provider may also do a digital rectal exam (BRANDON). A BRANDON is a physical exam of the prostate, not a lab test. For the exam, your provider will place a gloved finger in your rectum and feel the prostate to check for any bumps or abnormal  areas. The FDA recommends that a BRANDON be done along with a PSA test.  What do my test results mean?  Many things may affect your lab test results. These include the method each lab uses to do the test. Even if your test results are different from the normal value, you may not have a problem. To learn what the results mean for you, talk with your healthcare provider.  Results are given in nanograms per milliliter ng/mL. Normal results are below 4.0 ng/mL. A rising PSA may mean that you have cancer. But the PSA results alone won't tell your healthcare provider whether it's cancer or a benign prostate condition. If your provider suspects cancer, he or she will likely suggest that you have a biopsy of the prostate to make the diagnosis.  How is this test done?  The test requires a blood sample, which is drawn through a needle from a vein in your arm.  Does this test pose any risks?  Taking a blood sample with a needle carries risks that include bleeding, infection, bruising, or feeling dizzy. When the needle pricks your arm, you may feel a slight stinging sensation or pain. Afterward, the site may be slightly sore.  What might affect my test results?  An infection can affect your results, as can certain medicines. Riding a bicycle and ejaculation may also affect your results.  How do I get ready for this test?  You may need to abstain from sex and not ride a bicycle within 1 to 2 days of the test. In addition, be sure your healthcare provider knows about all medicines, herbs, vitamins, and supplements you are taking. This includes medicines that don't need a prescription and any illicit drugs you may use.     © 9942-2732 [employer]. [address] [place] [address], [place] [address]. All rights reserved. This information is not intended as a substitute for professional medical care. Always follow your healthcare professional's instructions.

## 2020-02-17 ENCOUNTER — TELEPHONE (OUTPATIENT)
Dept: UROLOGY | Facility: CLINIC | Age: 55
End: 2020-02-17

## 2020-02-17 DIAGNOSIS — N52.01 ERECTILE DYSFUNCTION DUE TO ARTERIAL INSUFFICIENCY: Primary | ICD-10-CM

## 2020-02-17 RX ORDER — PAPAVERINE HYDROCHLORIDE 30 MG/ML
INJECTION INTRAMUSCULAR; INTRAVENOUS
Qty: 10 ML | Refills: 11 | Status: SHIPPED | OUTPATIENT
Start: 2020-02-17 | End: 2020-11-30 | Stop reason: SDUPTHER

## 2020-02-17 NOTE — TELEPHONE ENCOUNTER
Rx refilled for Trimix.  Pls inform pt that he had 11 refills on the original Rx from 1/2020.    ----- Message from Esther Streeter RN sent at 2/14/2020  6:19 PM CST -----      ----- Message -----  From: Angela Carlisle  Sent: 2/14/2020   4:48 PM CST  To: Sebastian Barclay Staff    Pt called for the refill of papaverine 30 mg/mL injection.       [facility] [address].  [address] [place] [address].  Angela MILTON [address]  Phone: [phone] Fax: [phone]

## 2020-02-18 ENCOUNTER — TELEPHONE (OUTPATIENT)
Dept: UROLOGY | Facility: CLINIC | Age: 55
End: 2020-02-18

## 2020-02-18 NOTE — TELEPHONE ENCOUNTER
----- Message from Deny Cortes DNP sent at 2/17/2020 11:40 AM CST -----  Regarding: Trimix  Rx refilled for Trimix.  Pls inform pt that he had 11 refills on the original Rx from 1/2020.

## 2020-05-05 DIAGNOSIS — I10 ESSENTIAL HYPERTENSION: ICD-10-CM

## 2020-05-05 RX ORDER — LOSARTAN POTASSIUM 100 MG/1
100 TABLET ORAL DAILY
Qty: 30 TABLET | Refills: 0 | Status: SHIPPED | OUTPATIENT
Start: 2020-05-05 | End: 2020-06-04 | Stop reason: SDUPTHER

## 2020-05-05 NOTE — TELEPHONE ENCOUNTER
I called the patient and no voicemail set up. Unable to leave message.  The patient was last seen in April.

## 2020-05-05 NOTE — TELEPHONE ENCOUNTER
----- Message from Karl Zambrano sent at 5/5/2020 11:37 AM CDT -----  Contact: Radha [phone]  Prescription refill request.  RX name and strength: losartan (COZAAR) 100 MG tablet     Directions: Take 1 tablet (100 mg total) by mouth once daily. - Oral    Is this a 30 day or 90 day RX:  30 day    Local pharmacy or mail order pharmacy:  [facility]     Pharmacy name and phone #:  [address]   [phone] (Phone)  [phone] (Fax)    Additional information:   Pt took last pill today. Pharmacy is needing more refills authorized.

## 2020-06-04 ENCOUNTER — OFFICE VISIT (OUTPATIENT)
Dept: INTERNAL MEDICINE | Facility: CLINIC | Age: 55
End: 2020-06-04
Payer: COMMERCIAL

## 2020-06-04 VITALS
TEMPERATURE: 98 F | DIASTOLIC BLOOD PRESSURE: 80 MMHG | BODY MASS INDEX: 29.74 KG/M2 | HEART RATE: 97 BPM | SYSTOLIC BLOOD PRESSURE: 130 MMHG | WEIGHT: 174.19 LBS | HEIGHT: 64 IN

## 2020-06-04 DIAGNOSIS — Z00.00 ANNUAL PHYSICAL EXAM: Primary | ICD-10-CM

## 2020-06-04 DIAGNOSIS — I10 ESSENTIAL HYPERTENSION: ICD-10-CM

## 2020-06-04 DIAGNOSIS — Z01.00 ROUTINE EYE EXAM: ICD-10-CM

## 2020-06-04 DIAGNOSIS — M25.521 RIGHT ELBOW PAIN: ICD-10-CM

## 2020-06-04 DIAGNOSIS — R73.03 PREDIABETES: ICD-10-CM

## 2020-06-04 PROCEDURE — 99999 PR PBB SHADOW E&M-EST. PATIENT-LVL III: CPT | Mod: PBBFAC,,, | Performed by: HOSPITALIST

## 2020-06-04 PROCEDURE — 90471 TD VACCINE GREATER THAN OR EQUAL TO 7YO PRESERVATIVE FREE IM: ICD-10-PCS | Mod: S$GLB,,, | Performed by: HOSPITALIST

## 2020-06-04 PROCEDURE — 90714 TD VACC NO PRESV 7 YRS+ IM: CPT | Mod: S$GLB,,, | Performed by: HOSPITALIST

## 2020-06-04 PROCEDURE — 90471 IMMUNIZATION ADMIN: CPT | Mod: S$GLB,,, | Performed by: HOSPITALIST

## 2020-06-04 PROCEDURE — 90714 TD VACCINE GREATER THAN OR EQUAL TO 7YO PRESERVATIVE FREE IM: ICD-10-PCS | Mod: S$GLB,,, | Performed by: HOSPITALIST

## 2020-06-04 PROCEDURE — 3079F DIAST BP 80-89 MM HG: CPT | Mod: CPTII,S$GLB,, | Performed by: HOSPITALIST

## 2020-06-04 PROCEDURE — 99999 PR PBB SHADOW E&M-EST. PATIENT-LVL III: ICD-10-PCS | Mod: PBBFAC,,, | Performed by: HOSPITALIST

## 2020-06-04 PROCEDURE — 99396 PREV VISIT EST AGE 40-64: CPT | Mod: 25,S$GLB,, | Performed by: HOSPITALIST

## 2020-06-04 PROCEDURE — 3075F PR MOST RECENT SYSTOLIC BLOOD PRESS GE 130-139MM HG: ICD-10-PCS | Mod: CPTII,S$GLB,, | Performed by: HOSPITALIST

## 2020-06-04 PROCEDURE — 99396 PR PREVENTIVE VISIT,EST,40-64: ICD-10-PCS | Mod: 25,S$GLB,, | Performed by: HOSPITALIST

## 2020-06-04 PROCEDURE — 3075F SYST BP GE 130 - 139MM HG: CPT | Mod: CPTII,S$GLB,, | Performed by: HOSPITALIST

## 2020-06-04 PROCEDURE — 3079F PR MOST RECENT DIASTOLIC BLOOD PRESSURE 80-89 MM HG: ICD-10-PCS | Mod: CPTII,S$GLB,, | Performed by: HOSPITALIST

## 2020-06-04 RX ORDER — METHYLPREDNISOLONE 4 MG/1
TABLET ORAL
Qty: 1 PACKAGE | Refills: 0 | Status: SHIPPED | OUTPATIENT
Start: 2020-06-04 | End: 2020-06-25

## 2020-06-04 RX ORDER — LOSARTAN POTASSIUM 100 MG/1
100 TABLET ORAL DAILY
Qty: 30 TABLET | Refills: 11 | Status: SHIPPED | OUTPATIENT
Start: 2020-06-04 | End: 2021-06-17

## 2020-06-04 NOTE — PROGRESS NOTES
Subjective:     @Patient ID: Roberto Edgar is a 55 y.o. male.    Chief Complaint: Medication Management    HPI    54 y.o. male here for annual exam and medication refill. Pt reports doing well. But has had some R elbow pain for the past few weeks. 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 For training has had to do exercises and not sure if pulled something during then.      Lipid disorders/ASCVD risk (ages >/= 45 or >/= 20 if increased risk ): ordered  DM (>45y yearly or if obese, HTN): A1c ordered   Hepatitis C (one time if born between 4661-6202): done  Eye exam:  dUE     Colorectal Cancer (normal risk 50-75yr): Colonoscopy done   Prostate (per discussion with patient starting at 50y or 45y if high risk): ordered     Vaccines:   Influenza (yearly): n/a   Tetanus (every 10 yrs - 1st tdap)  due   PPSV23(>66yo or <65 w/ lung dz, smoking, DM)  n/a  PCV13 (> 65 or <65 w/ immunocompromised) n/a  Zoster (>59yo) n/a      Exercise: running   Diet: Regular        Review of Systems   Constitutional: Negative for chills and fever.   HENT: Negative for congestion and sore throat.    Eyes: Negative for pain and visual disturbance.   Respiratory: Negative for cough and shortness of breath.    Cardiovascular: Negative for chest pain and leg swelling.   Gastrointestinal: Negative for abdominal pain, nausea and vomiting.   Endocrine: Negative for polydipsia and polyuria.   Genitourinary: Negative for difficulty urinating and dysuria.   Musculoskeletal: Positive for arthralgias. Negative for back pain.   Skin: Negative for rash and wound.   Neurological: Negative for weakness and headaches.   Psychiatric/Behavioral: Negative for agitation and confusion.     Past medical history, surgical history, and family medical history reviewed and updated as appropriate.    Medications and allergies reviewed.     Objective:     Vitals:    06/04/20 1505   BP: 130/80   BP Location: Right arm   Patient Position: Sitting   BP Method: Medium  "(Manual)   Pulse: 97   Temp: 97.8 °F (36.6 °C)   TempSrc: Oral   Weight: 79 kg (174 lb 2.6 oz)   Height: 5' 4" (1.626 m)     Body mass index is 29.9 kg/m².  Physical Exam   Constitutional: He is oriented to person, place, and time. He appears well-developed and well-nourished. No distress.   HENT:   Head: Normocephalic and atraumatic.   Mouth/Throat: Oropharynx is clear and moist. No oropharyngeal exudate.   Eyes: Conjunctivae are normal. Right eye exhibits no discharge. Left eye exhibits no discharge.   Neck: Normal range of motion. Neck supple.   Cardiovascular: Normal rate, regular rhythm and normal heart sounds. Exam reveals no friction rub.   No murmur heard.  Pulmonary/Chest: Effort normal and breath sounds normal.   Abdominal: Soft. Bowel sounds are normal. He exhibits no distension. There is no tenderness.   Musculoskeletal: Normal range of motion. He exhibits tenderness (r elbow area). He exhibits no edema.   Lymphadenopathy:     He has no cervical adenopathy.   Neurological: He is alert and oriented to person, place, and time.   Skin: Skin is warm and dry.   Psychiatric: He has a normal mood and affect. His behavior is normal.   Vitals reviewed.      Lab Results   Component Value Date    WBC 4.80 05/02/2019    HGB 15.5 05/02/2019    HCT 47.9 05/02/2019     05/02/2019    CHOL 163 05/02/2019    TRIG 134 05/02/2019    HDL 37 (L) 05/02/2019    ALT 38 05/02/2019    AST 33 05/02/2019     05/02/2019    K 4.4 05/02/2019     05/02/2019    CREATININE 1.0 05/02/2019    BUN 17 05/02/2019    CO2 27 05/02/2019    TSH 2.922 05/02/2019    PSA 3.7 05/02/2019    HGBA1C 5.9 (H) 05/02/2019       Assessment:     1. Annual physical exam    2. Routine eye exam    3. Essential hypertension    4. Right elbow pain    5. Prediabetes    6. BMI 29.0-29.9,adult      Plan:   Roberto was seen today for medication management.    Diagnoses and all orders for this visit:    Annual physical exam  -     Comprehensive " metabolic panel; Future  -     CBC auto differential; Future  -     TSH; Future  -     Vitamin D; Future  -     Lipid Panel; Future  -     Urinalysis; Future  -     HEMOGLOBIN A1C; Future  -     PSA, Screening; Future    Routine eye exam  -     Ambulatory referral/consult to Optometry; Future    Essential hypertension  -     losartan (COZAAR) 100 MG tablet; Take 1 tablet (100 mg total) by mouth once daily.    Right elbow pain         - Counseled on rest, nsaid prn. Will give medrol     Prediabetes         - Encouraged healthy diet and exercise    BMI 29.0-29.9,adult         - Encouraged healthy diet and exercise     Other orders  -     Cancel: Hepatitis C Antibody; Future  -     Cancel: HIV 1/2 Ag/Ab (4th Gen); Future  -     (In Office Administered) Td Vaccine - Preservative Free  -     methylPREDNISolone (MEDROL DOSEPACK) 4 mg tablet; use as directed        RTC  1 year and prn    Coral Rodriguez MD  Internal Medicine    6/4/2020

## 2020-06-09 ENCOUNTER — TELEPHONE (OUTPATIENT)
Dept: INTERNAL MEDICINE | Facility: CLINIC | Age: 55
End: 2020-06-09

## 2020-06-09 ENCOUNTER — LAB VISIT (OUTPATIENT)
Dept: LAB | Facility: HOSPITAL | Age: 55
End: 2020-06-09
Attending: HOSPITALIST
Payer: COMMERCIAL

## 2020-06-09 DIAGNOSIS — Z00.00 ANNUAL PHYSICAL EXAM: ICD-10-CM

## 2020-06-09 DIAGNOSIS — Z00.00 ANNUAL PHYSICAL EXAM: Primary | ICD-10-CM

## 2020-06-09 LAB
BILIRUB UR QL STRIP: NEGATIVE
CLARITY UR REFRACT.AUTO: CLEAR
COLOR UR AUTO: YELLOW
GLUCOSE UR QL STRIP: NEGATIVE
HGB UR QL STRIP: NEGATIVE
KETONES UR QL STRIP: NEGATIVE
LEUKOCYTE ESTERASE UR QL STRIP: NEGATIVE
NITRITE UR QL STRIP: NEGATIVE
PH UR STRIP: 5 [PH] (ref 5–8)
PROT UR QL STRIP: NEGATIVE
SP GR UR STRIP: 1.02 (ref 1–1.03)
URN SPEC COLLECT METH UR: NORMAL

## 2020-06-09 PROCEDURE — 81003 URINALYSIS AUTO W/O SCOPE: CPT

## 2020-06-09 NOTE — TELEPHONE ENCOUNTER
----- Message from Coral Rodriguez MD sent at 6/9/2020  7:48 AM CDT -----  Please call lab to add-on HIV test to labs per his request. He had labs done this AM

## 2020-06-10 ENCOUNTER — LAB VISIT (OUTPATIENT)
Dept: LAB | Facility: HOSPITAL | Age: 55
End: 2020-06-10
Attending: HOSPITALIST
Payer: COMMERCIAL

## 2020-06-10 DIAGNOSIS — Z00.00 ANNUAL PHYSICAL EXAM: ICD-10-CM

## 2020-06-10 PROCEDURE — 86703 HIV-1/HIV-2 1 RESULT ANTBDY: CPT

## 2020-06-10 PROCEDURE — 36415 COLL VENOUS BLD VENIPUNCTURE: CPT | Mod: PO

## 2020-06-11 LAB — HIV 1+2 AB+HIV1 P24 AG SERPL QL IA: NEGATIVE

## 2020-06-12 ENCOUNTER — TELEPHONE (OUTPATIENT)
Dept: INTERNAL MEDICINE | Facility: CLINIC | Age: 55
End: 2020-06-12

## 2020-06-12 DIAGNOSIS — R97.20 ELEVATED PSA: ICD-10-CM

## 2020-06-12 DIAGNOSIS — R73.03 PREDIABETES: Primary | ICD-10-CM

## 2020-06-12 NOTE — TELEPHONE ENCOUNTER
The patient informed of his test results and verbalized understanding. The patient wishes to try diet and exercise before starting medication. Urology appointment has already been scheduled.

## 2020-06-19 ENCOUNTER — TELEPHONE (OUTPATIENT)
Dept: INTERNAL MEDICINE | Facility: CLINIC | Age: 55
End: 2020-06-19

## 2020-06-19 NOTE — TELEPHONE ENCOUNTER
----- Message from Ewa Arteaga sent at 6/19/2020 12:52 PM CDT -----  Regarding: Results  Contact: Roberto Edgar [phone]  Received his lab results but his HIV was not on there. Can you check on this for him?

## 2020-07-06 ENCOUNTER — LAB VISIT (OUTPATIENT)
Dept: LAB | Facility: HOSPITAL | Age: 55
End: 2020-07-06
Payer: COMMERCIAL

## 2020-07-06 DIAGNOSIS — Z80.42 FAMILY HISTORY OF PROSTATE CANCER IN FATHER: ICD-10-CM

## 2020-07-06 DIAGNOSIS — Z12.5 SCREENING FOR PROSTATE CANCER: ICD-10-CM

## 2020-07-06 LAB — COMPLEXED PSA SERPL-MCNC: 8.1 NG/ML (ref 0–4)

## 2020-07-06 PROCEDURE — 36415 COLL VENOUS BLD VENIPUNCTURE: CPT

## 2020-07-06 PROCEDURE — 84153 ASSAY OF PSA TOTAL: CPT

## 2020-07-13 ENCOUNTER — OFFICE VISIT (OUTPATIENT)
Dept: UROLOGY | Facility: CLINIC | Age: 55
End: 2020-07-13
Payer: COMMERCIAL

## 2020-07-13 VITALS
HEART RATE: 84 BPM | SYSTOLIC BLOOD PRESSURE: 142 MMHG | HEIGHT: 65 IN | WEIGHT: 167.56 LBS | BODY MASS INDEX: 27.92 KG/M2 | DIASTOLIC BLOOD PRESSURE: 88 MMHG

## 2020-07-13 DIAGNOSIS — N40.0 BENIGN PROSTATIC HYPERPLASIA WITHOUT LOWER URINARY TRACT SYMPTOMS: ICD-10-CM

## 2020-07-13 DIAGNOSIS — R97.20 ELEVATED PSA: Primary | ICD-10-CM

## 2020-07-13 DIAGNOSIS — Z80.42 FAMILY HISTORY OF PROSTATE CANCER IN FATHER: ICD-10-CM

## 2020-07-13 PROCEDURE — 99214 PR OFFICE/OUTPT VISIT, EST, LEVL IV, 30-39 MIN: ICD-10-PCS | Mod: S$GLB,,, | Performed by: NURSE PRACTITIONER

## 2020-07-13 PROCEDURE — 99214 OFFICE O/P EST MOD 30 MIN: CPT | Mod: S$GLB,,, | Performed by: NURSE PRACTITIONER

## 2020-07-13 PROCEDURE — 3077F PR MOST RECENT SYSTOLIC BLOOD PRESSURE >= 140 MM HG: ICD-10-PCS | Mod: CPTII,S$GLB,, | Performed by: NURSE PRACTITIONER

## 2020-07-13 PROCEDURE — 3077F SYST BP >= 140 MM HG: CPT | Mod: CPTII,S$GLB,, | Performed by: NURSE PRACTITIONER

## 2020-07-13 PROCEDURE — 3079F DIAST BP 80-89 MM HG: CPT | Mod: CPTII,S$GLB,, | Performed by: NURSE PRACTITIONER

## 2020-07-13 PROCEDURE — 3079F PR MOST RECENT DIASTOLIC BLOOD PRESSURE 80-89 MM HG: ICD-10-PCS | Mod: CPTII,S$GLB,, | Performed by: NURSE PRACTITIONER

## 2020-07-13 PROCEDURE — 3008F BODY MASS INDEX DOCD: CPT | Mod: CPTII,S$GLB,, | Performed by: NURSE PRACTITIONER

## 2020-07-13 PROCEDURE — 99999 PR PBB SHADOW E&M-EST. PATIENT-LVL IV: ICD-10-PCS | Mod: PBBFAC,,, | Performed by: NURSE PRACTITIONER

## 2020-07-13 PROCEDURE — 99999 PR PBB SHADOW E&M-EST. PATIENT-LVL IV: CPT | Mod: PBBFAC,,, | Performed by: NURSE PRACTITIONER

## 2020-07-13 PROCEDURE — 3008F PR BODY MASS INDEX (BMI) DOCUMENTED: ICD-10-PCS | Mod: CPTII,S$GLB,, | Performed by: NURSE PRACTITIONER

## 2020-07-13 RX ORDER — CIPROFLOXACIN 500 MG/1
TABLET ORAL
Qty: 4 TABLET | Refills: 0 | Status: SHIPPED | OUTPATIENT
Start: 2020-07-13 | End: 2020-10-14 | Stop reason: CLARIF

## 2020-07-13 RX ORDER — LIDOCAINE HYDROCHLORIDE 20 MG/ML
JELLY TOPICAL ONCE
Status: CANCELLED | OUTPATIENT
Start: 2020-07-13 | End: 2020-07-13

## 2020-07-13 RX ORDER — LIDOCAINE HYDROCHLORIDE 10 MG/ML
10 INJECTION INFILTRATION; PERINEURAL ONCE
Status: CANCELLED | OUTPATIENT
Start: 2020-07-13 | End: 2020-07-13

## 2020-07-13 NOTE — PATIENT INSTRUCTIONS
Transrectal Ultrasound and Biopsy    A transrectal ultrasound is an imaging test. It uses sound waves to create pictures of a mans prostate gland. Your prostate gland is in front of your rectum. For this test, a special probe (transducer) is placed directly into your rectum. During the test, tissue samples (a biopsy) may also be taken. The test is done by a specially trained technologist called a sonographer.  Getting ready for your test  · You may be asked to clear your bowel before the test. This may be done by injecting liquid into your rectum (an enema). Or it can be done by drinking a special liquid.  · You may be asked not to eat or drink anything after midnight the night before the test.  · Tell your health care provider about any medicines, herbs, or supplements you are taking. This includes any over-the-counter medicines such as aspirin or ibuprofen.  · Answer any questions your provider has about your medical history. This will help your provider tailor the test to your health needs.  During your test  · You may be asked to change into a gown. You will then lie on your side on an exam table, with your knees bent.  · The test is done with a hand-held probe. This is a short, slender roge. It has a sterile, disposable cover. It is also greased (lubricated) with some gel. It is then gently placed inside your rectum.  · You will feel pressure from the probe. If you feel pain, let your provider know.  · If a biopsy is taken, it is done using a small probe with a very tiny needle on the end. This needle enters your prostate and removes several tiny samples of tissue. These samples are then sent to a lab to be examined. Any mild pain from the biopsy is usually minor.   After your test  Before leaving, you may need to wait for a short time while the images are reviewed. In most cases, you can go back to your normal routine after the test. If you had a biopsy, you may see some blood in your urine, sperm, or stool  for a day or so. This is normal. Your health care provider will let you know when your test results are ready.  In some cases, a diagnosis cant be made from the tissue sample that was taken. If this happens, your provider will talk with you about whether you may need another biopsy. Or you may need a different procedure.  When to call your health care provider  Call your provider if you have:  · Very bloody urine or stool  · A fever lasting 24 to 48 hours  · Any other symptoms that your provider asks you to report, based on your medical condition   Date Last Reviewed: 6/19/2015  © 0986-9996 [employer]. [address] [place] [address]. All rights reserved. This information is not intended as a substitute for professional medical care. Always follow your healthcare professional's instructions.        Prostate Biopsy    Cancer occurs when abnormal cells form a tumor. A tumor is a lump of cells that grow uncontrolled. Initial tests that may indicate cancer of the prostate include a digital rectal exam, a PSA (prostate specific antigen blood test), ultrasound, and others. A core needle biopsy will be done if your healthcare provider thinks you have prostate cancer. A thin needle is used to remove small samples of prostate tissue. Usually multiple biopsies are taken. These samples are checked for cancer.  Taking tissue samples  A biopsy takes about 15 to 20 minutes. You may be given an enema or suppository before the biopsy to clear the bowels. Antibiotics are given at least an hour before the biopsy. During the procedure:  · You will be given antibiotics to prevent infection.  · You may be given a sedative, local pain killer, or pain medicine.  · A small, ultrasound  probe is put into the rectum as you lie on your side. A picture of your prostate can then be seen on a monitor. This is called a transrectal ultrasound (TRUS).  · Your healthcare provider will use the TRUS picture as a guide. He or  she will use a thin needle to remove tiny tissue samples from some sites in the prostate.  · These tissue samples are sent to the pathology department. They are looked at under a microscope so a diagnosis can be made.   Risks and complications of core needle biopsy  · Infection  · Blood in urine, stool, or semen  · Pain  · The biopsy misses the tumor   Home care  You may have had the biopsy done through your rectum, your urethra, or through the skin between your scrotum and rectum. Your healthcare provider will tell you what to do after the biopsy. These instructions are based on your health condition, the type of biopsy, and your providers practices.  · Your provider may give you pain medicine such as acetaminophen or ibuprofen for discomfort or pain. Follow your providers instructions for taking these medicines. Dont take aspirin or ibuprofen after the procedure. If you have a chronic liver or kidney disease, talk with your provider before taking these medicines. Also talk with your provider if youve had a stomach ulcer or gastrointestinal bleeding. Let your provider know all the medicines you currently take.  · You may need to take antibiotics for 1 to 2 days. This will help prevent an infection. Signs of an infection include chills, pain, or fever.  · You may be told to drink 8 ounces of water every 30 minutes for 2 hours. This will help ease any discomfort. You can also take a warm bath or put a warm, damp washcloth over your urethra to help ease the pain.  · You may see minor bleeding after the procedure. This is normal and usually needs no treatment. You may see blood in your urine or semen (rust color). You may also have light bleeding from your rectum if you have hemorrhoids.  · Your provider will tell you when you can go back to your normal activities. This includes sex, exercise, and straining physically. Discuss these with your provider.  When to seek medical advice  Call your healthcare provider  right away if any of these occur:  · You arent able to urinate, or see that you have less flow of urine  · Chills and fever of 100.4°F (38°C)    · Severe pain  · Signs of an infection that is getting worse. These include worsening pain, pain in your side under the rib cage or in the low back, or foul-smelling urine.  · Blood clots or bright red blood in your stool or urine  · You feel confused or very tired  Date Last Reviewed: 1/1/2017  © 5863-7692 [employer]. [address] [place] [address]. All rights reserved. This information is not intended as a substitute for professional medical care. Always follow your healthcare professional's instructions.

## 2020-07-13 NOTE — LETTER
July 13, 2020      Coral Rodriguez MD  [address]  [place] [address]           [facility]  [address]  [place] [address]  Phone: [phone]          Patient: Roberto Edgar   MR Number: [id number]   YOB: 1965   Date of Visit: 7/13/2020       Dear Dr. Coral Rodriguez:    Thank you for referring Roberto Edgar to me for evaluation. Attached you will find relevant portions of my assessment and plan of care.    If you have questions, please do not hesitate to call me. I look forward to following Roberto Edgar along with you.    Sincerely,    Jazlyn Cardoso, NP    Enclosure  CC:  No Recipients    If you would like to receive this communication electronically, please contact [email or web address] or [phone] to request more information on [facility] access.    For providers and/or their staff who would like to refer a patient to [facility], please contact us through our one-stop-shop provider referral line, Patricio Colunga, at [phone].    If you feel you have received this communication in error or would no longer like to receive these types of communications, please e-mail [email or web address]

## 2020-07-13 NOTE — PROGRESS NOTES
Subjective:       Patient ID: Roberto Edgar is a 55 y.o. male.    Chief Complaint: Elevated PSA    Roberto Edgar is a 55 y.o. male with a history of bph/ED  He has a strong family history of Prostate Cancer; (father, grandfather, uncle)  He has been using ICI therapy for his ED.  Last clinic visit 01/03/2020.    He is here today due to elevated PSA  No urinary complaints  No abdominal or bone pain.  No issues with ICI therapy            PSA                     8.1 (H)             07/06/2020                 PSA                      7.0 (H)             06/09/2020                 PSA                      3.7                 05/02/2019                 PSA                      2.4                 10/27/2017                                       Past Medical History:  No date: Hypertension  No date: Shoulder pain, bilateral    Past Surgical History:  8/2/2018: COLONOSCOPY; N/A      Comment:  Procedure: COLONOSCOPY;  Surgeon: Saeid Velazquez MD;                Location: [facility] ([address] [place] [address]);  Service: Endoscopy;                 Laterality: N/A;  No date: HERNIA REPAIR    Review of patient's family history indicates:  Problem: Hypertension      Relation: Mother          Age of Onset: (Not Specified)  Problem: Diabetes      Relation: Father          Age of Onset: (Not Specified)  Problem: Cancer      Relation: Father          Age of Onset: (Not Specified)          Comment: prostate ca  Problem: Cancer      Relation: Paternal Uncle          Age of Onset: (Not Specified)          Comment: prostate ca  Problem: Cancer      Relation: Maternal Grandfather          Age of Onset: (Not Specified)          Comment: prostate ca  Problem: Glaucoma      Relation: Neg Hx          Age of Onset: (Not Specified)  Problem: Macular degeneration      Relation: Neg Hx          Age of Onset: (Not Specified)  Problem: Retinal detachment      Relation: Neg Hx          Age of Onset: (Not Specified)  Problem: Strabismus      Relation: Neg Hx           Age of Onset: (Not Specified)  Problem: Cataracts      Relation: Neg Hx          Age of Onset: (Not Specified)  Problem: Amblyopia      Relation: Neg Hx          Age of Onset: (Not Specified)  Problem: Blindness      Relation: Neg Hx          Age of Onset: (Not Specified)      Social History    Socioeconomic History      Marital status:       Spouse name: Not on file      Number of children: 1      Years of education: Not on file      Highest education level: Not on file    Occupational History      Occupation:     Social Needs      Financial resource strain: Not on file      Food insecurity        Worry: Not on file        Inability: Not on file      Transportation needs        Medical: Not on file        Non-medical: Not on file    Tobacco Use      Smoking status: Never Smoker      Smokeless tobacco: Never Used    Substance and Sexual Activity      Alcohol use: Yes        Comment: once in a while      Drug use: No      Sexual activity: Not Currently    Lifestyle      Physical activity        Days per week: Not on file        Minutes per session: Not on file      Stress: Not on file    Relationships      Social connections        Talks on phone: Not on file        Gets together: Not on file        Attends Gnosticism service: Not on file        Active member of club or organization: Not on file        Attends meetings of clubs or organizations: Not on file        Relationship status: Not on file    Other Topics      Concerns:        Not on file    Social History Narrative      Not on file      Allergies:  Patient has no known allergies.    Medications:  Current Outpatient Medications:   losartan (COZAAR) 100 MG tablet, Take 1 tablet (100 mg total) by mouth once daily., Disp: 30 tablet, Rfl: 11  papaverine 30 mg/mL injection, Add: Phentolamine 10 mg Add: PGE1 1mcg Sig:  Inject 5 units as directed, Disp: 10 mL, Rfl: 11  ciprofloxacin HCl (CIPRO) 500 MG tablet, Take one tab night before procedure.  Take one morning of procedure. Take one tab night after procedure. Take last tab next morning after procedure., Disp: 4 tablet, Rfl: 0  sodium phosphates (FLEET) 19-7 gram/118 mL Enem, Place 1 enema rectally once. Use morning of biopsy. for 1 dose, Disp: 1 Bottle, Rfl: 0      Review of Systems   Constitutional: Negative for activity change, appetite change, chills and fever.   HENT: Negative for facial swelling and trouble swallowing.    Eyes: Negative for visual disturbance.   Respiratory: Negative for chest tightness and shortness of breath.    Cardiovascular: Negative for chest pain and palpitations.   Gastrointestinal: Negative.  Negative for abdominal pain, constipation, diarrhea, nausea and vomiting.   Genitourinary: Negative for difficulty urinating, dysuria, flank pain, hematuria, penile pain, penile swelling, scrotal swelling and testicular pain.        Ok with urination     Musculoskeletal: Negative for back pain, gait problem, myalgias and neck stiffness.   Skin: Negative for rash.   Neurological: Negative for dizziness and speech difficulty.   Hematological: Does not bruise/bleed easily.   Psychiatric/Behavioral: Negative for behavioral problems. The patient is nervous/anxious.        Objective:      Physical Exam   Nursing note and vitals reviewed.  Constitutional: He is oriented to person, place, and time. He appears well-developed. He is cooperative.  Non-toxic appearance.   Urine dipped clear of infection in the office today.     HENT:   Head: Normocephalic and atraumatic.   Right Ear: External ear normal.   Left Ear: External ear normal.   Nose: Nose normal.   Eyes: Conjunctivae and lids are normal. No scleral icterus.   Neck: Trachea normal, normal range of motion and full passive range of motion without pain. Neck supple. No JVD present. No tracheal deviation present.   Cardiovascular: Normal rate, S1 normal and S2 normal.    Pulmonary/Chest: Effort normal. No respiratory distress. He exhibits no  tenderness.   Abdominal: Soft. Normal appearance. There is no abdominal tenderness.   Genitourinary:    Testes, penis and rectum normal.   Rectum:      No rectal mass, tenderness or external hemorrhoid.   Prostate is enlarged. Prostate is not tender.    Genitourinary Comments: BRANDON difficult due to resistance/firm stool  ~40gms no apparent nodules;       Musculoskeletal: Normal range of motion.   Neurological: He is alert and oriented to person, place, and time.   Skin: Skin is warm and dry.     Psychiatric: His behavior is normal. Judgment and thought content normal.       Assessment:       1. Elevated PSA    2. Family history of prostate cancer in father    3. Benign prostatic hyperplasia without lower urinary tract symptoms        Plan:         I spent 25 minutes with the patient of which more than half was spent in direct consultation with the patient in regards to our treatment and plan.    Education and recommendations of today's plan of care including home remedies.  We discussed his recent PSA results.  Reassurance given; discussed the contributory factors for the rise.  Recommendations to proceed with TRUS bx due to 2 separate elevations and family history of prostate cancer.   We discussed the prostate biopsy: pre, pre, & postop expectations; risks, and benefits.  Educational materials and prep meds given  Voiced understanding

## 2020-07-16 ENCOUNTER — OFFICE VISIT (OUTPATIENT)
Dept: SURGERY | Facility: CLINIC | Age: 55
End: 2020-07-16
Payer: COMMERCIAL

## 2020-07-16 VITALS
SYSTOLIC BLOOD PRESSURE: 129 MMHG | HEART RATE: 88 BPM | DIASTOLIC BLOOD PRESSURE: 90 MMHG | BODY MASS INDEX: 27.99 KG/M2 | HEIGHT: 65 IN | WEIGHT: 168 LBS

## 2020-07-16 DIAGNOSIS — K64.4 EXTERNAL HEMORRHOID: Primary | ICD-10-CM

## 2020-07-16 PROCEDURE — 99203 PR OFFICE/OUTPT VISIT, NEW, LEVL III, 30-44 MIN: ICD-10-PCS | Mod: 25,S$GLB,, | Performed by: NURSE PRACTITIONER

## 2020-07-16 PROCEDURE — 46600 DIAGNOSTIC ANOSCOPY SPX: CPT | Mod: S$GLB,,, | Performed by: NURSE PRACTITIONER

## 2020-07-16 PROCEDURE — 3080F DIAST BP >= 90 MM HG: CPT | Mod: CPTII,S$GLB,, | Performed by: NURSE PRACTITIONER

## 2020-07-16 PROCEDURE — 46600 PR DIAG2STIC A2SCOPY: ICD-10-PCS | Mod: S$GLB,,, | Performed by: NURSE PRACTITIONER

## 2020-07-16 PROCEDURE — 99999 PR PBB SHADOW E&M-EST. PATIENT-LVL III: CPT | Mod: PBBFAC,,, | Performed by: NURSE PRACTITIONER

## 2020-07-16 PROCEDURE — 3008F BODY MASS INDEX DOCD: CPT | Mod: CPTII,S$GLB,, | Performed by: NURSE PRACTITIONER

## 2020-07-16 PROCEDURE — 3074F PR MOST RECENT SYSTOLIC BLOOD PRESSURE < 130 MM HG: ICD-10-PCS | Mod: CPTII,S$GLB,, | Performed by: NURSE PRACTITIONER

## 2020-07-16 PROCEDURE — 99999 PR PBB SHADOW E&M-EST. PATIENT-LVL III: ICD-10-PCS | Mod: PBBFAC,,, | Performed by: NURSE PRACTITIONER

## 2020-07-16 PROCEDURE — 3074F SYST BP LT 130 MM HG: CPT | Mod: CPTII,S$GLB,, | Performed by: NURSE PRACTITIONER

## 2020-07-16 PROCEDURE — 3008F PR BODY MASS INDEX (BMI) DOCUMENTED: ICD-10-PCS | Mod: CPTII,S$GLB,, | Performed by: NURSE PRACTITIONER

## 2020-07-16 PROCEDURE — 99203 OFFICE O/P NEW LOW 30 MIN: CPT | Mod: 25,S$GLB,, | Performed by: NURSE PRACTITIONER

## 2020-07-16 PROCEDURE — 3080F PR MOST RECENT DIASTOLIC BLOOD PRESSURE >= 90 MM HG: ICD-10-PCS | Mod: CPTII,S$GLB,, | Performed by: NURSE PRACTITIONER

## 2020-07-16 RX ORDER — HYDROCORTISONE 25 MG/G
CREAM TOPICAL 2 TIMES DAILY
Qty: 20 G | Refills: 2 | Status: SHIPPED | OUTPATIENT
Start: 2020-07-16 | End: 2022-11-10

## 2020-07-16 NOTE — PROGRESS NOTES
CRS Office Visit History and Physical    Referring Md:   Aaareferral Self  No address on file    SUBJECTIVE:     Chief Complaint: hemorrhoids    History of Present Illness:  The patient is new patient to this practice.   Course is as follows:  Patient is a 55 y.o. male presents with rectal discomfort thought to be hemorrhoids since 2017. He feels external tissue that remains prolapsed. Occasionally it will swell and hurt. He denies itching or rectal pressure. He reports increased irritation the past few days after a BRANDON by urology. Of note he was to have a prostate biopsy today that he postponed to have hemorrhoids looked at.   Symptoms have been present for 3 years, worse in past few days..  Has tried hemorrhoid creams.  Associated bleeding: yes, rare  Previous anorectal procedures: no  denies straining/prolonged time on toilet with bowel movements.  is not currently taking fiber supplement or stool softener  Blood thinners: No    Last Colonoscopy: 2018  -normal  -repeat 10 years    Family history of colorectal cancer or IBD: none.    Review of patient's allergies indicates:  No Known Allergies    Past Medical History:   Diagnosis Date    Hypertension     Shoulder pain, bilateral      Past Surgical History:   Procedure Laterality Date    COLONOSCOPY N/A 8/2/2018    Procedure: COLONOSCOPY;  Surgeon: Saeid Velazquez MD;  Location: [facility] ([address] [place] [address]);  Service: Endoscopy;  Laterality: N/A;    HERNIA REPAIR       Family History   Problem Relation Age of Onset    Hypertension Mother     Diabetes Father     Cancer Father         prostate ca    Cancer Paternal Uncle         prostate ca    Cancer Maternal Grandfather         prostate ca    Glaucoma Neg Hx     Macular degeneration Neg Hx     Retinal detachment Neg Hx     Strabismus Neg Hx     Cataracts Neg Hx     Amblyopia Neg Hx     Blindness Neg Hx      Social History     Tobacco Use    Smoking status: Never Smoker    Smokeless tobacco: Never Used  "  Substance Use Topics    Alcohol use: Yes     Comment: once in a while    Drug use: No        Review of Systems:  Review of Systems   Constitutional: Negative for chills, fever and weight loss.   Respiratory: Negative for cough and shortness of breath.    Gastrointestinal: Negative for abdominal pain, blood in stool, constipation and diarrhea.        Rare brbpr with bm  Hemorrhoidal discomfort   Genitourinary: Negative for dysuria and hematuria.   Musculoskeletal: Negative for joint pain and myalgias.   Skin: Negative for itching and rash.   Psychiatric/Behavioral: The patient is not nervous/anxious.    All other systems reviewed and are negative.      OBJECTIVE:     Vital Signs (Most Recent)  Blood Pressure (Abnormal) 129/90   Pulse 88   Height 5' 5" (1.651 m)   Weight 76.2 kg (167 lb 15.9 oz)   Body Mass Index 27.96 kg/m²     Physical Exam:  General: Black or  male in no distress   Neuro: Alert and oriented to person, place, and time.  Moves all extremities.     HEENT: No icterus.  Trachea midline  Respiratory: Respirations are even and unlabored, no cough or audible wheezing  Abdomen: soft, round  Skin: Warm dry and intact, No visible rashes, no jaundice    Labs reviewed today:  Lab Results   Component Value Date    WBC 10.95 06/09/2020    HGB 15.5 06/09/2020    HCT 47.6 06/09/2020     06/09/2020    CHOL 192 06/09/2020    TRIG 206 (H) 06/09/2020    HDL 49 06/09/2020    ALT 35 06/09/2020    AST 25 06/09/2020     06/09/2020    K 4.7 06/09/2020     06/09/2020    CREATININE 1.1 06/09/2020    BUN 15 06/09/2020    CO2 25 06/09/2020    TSH 1.938 06/09/2020    PSA 7.0 (H) 06/09/2020    HGBA1C 6.4 (H) 06/09/2020       Imaging reviewed today:  none    Endoscopy reviewed today:  Last Colonoscopy completed on 8/2/2018  - The entire examined colon is normal.   - No specimens collected.   - Repeat in 10 years      Anorectal Exam:    Anal Skin: External hemorrhoids, right " anterior    Digital Rectal Exam:  Resting Tone normal  Squeeze normal  Relaxation with bear down present  Mass none  Rectocele  absent  Tenderness  absent    Anoscopy:  Verbal consent was obtained.   Clear plastic anoscope inserted.    Hemorrhoids  present  Stigmata of bleeding  absent  Stigmata of prolapsed  absent  Distal rectal mucosa normal      ASSESSMENT/PLAN:     Diagnoses and all orders for this visit:    External hemorrhoid  -     hydrocortisone 2.5 % cream; Apply topically 2 (two) times daily.        The patient was instructed to:  Increase water intake to at least 8-10 glasses of water per day.  Take a daily fiber supplement (Konsyl, Benefiber, Metamucil) and increase dietary intake to 20-30 grams/day.  Avoid straining or spending >5min/event with bowel movements.  Follow-up in clinic prn. Will have prostate worked up and then notify for appt with MD for hemorrhoid removal..      Shonna Allen, ANNIKA-LLOYD  Colon and Rectal Surgery

## 2020-07-31 ENCOUNTER — PROCEDURE VISIT (OUTPATIENT)
Dept: UROLOGY | Facility: CLINIC | Age: 55
End: 2020-07-31
Payer: COMMERCIAL

## 2020-07-31 VITALS
WEIGHT: 168.69 LBS | SYSTOLIC BLOOD PRESSURE: 138 MMHG | HEART RATE: 80 BPM | DIASTOLIC BLOOD PRESSURE: 74 MMHG | HEIGHT: 65 IN | BODY MASS INDEX: 28.11 KG/M2 | TEMPERATURE: 98 F | RESPIRATION RATE: 17 BRPM

## 2020-07-31 DIAGNOSIS — Z80.42 FAMILY HISTORY OF PROSTATE CANCER IN FATHER: ICD-10-CM

## 2020-07-31 DIAGNOSIS — R97.20 ELEVATED PSA: ICD-10-CM

## 2020-07-31 DIAGNOSIS — C61 PROSTATE CANCER: Primary | ICD-10-CM

## 2020-07-31 PROBLEM — Z12.5 SCREENING FOR PROSTATE CANCER: Status: RESOLVED | Noted: 2018-08-02 | Resolved: 2020-07-31

## 2020-07-31 PROCEDURE — 76872 US TRANSRECTAL: CPT | Mod: 26,S$GLB,, | Performed by: UROLOGY

## 2020-07-31 PROCEDURE — 88305 TISSUE EXAM BY PATHOLOGIST: ICD-10-PCS | Mod: 26,,, | Performed by: PATHOLOGY

## 2020-07-31 PROCEDURE — 55700 PR BIOPSY OF PROSTATE,NEEDLE/PUNCH: CPT | Mod: S$GLB,,, | Performed by: UROLOGY

## 2020-07-31 PROCEDURE — 88305 TISSUE EXAM BY PATHOLOGIST: CPT | Mod: 26,,, | Performed by: PATHOLOGY

## 2020-07-31 PROCEDURE — 76872 PR US TRANSRECTAL: ICD-10-PCS | Mod: 26,S$GLB,, | Performed by: UROLOGY

## 2020-07-31 PROCEDURE — 55700 PR BIOPSY OF PROSTATE,NEEDLE/PUNCH: ICD-10-PCS | Mod: S$GLB,,, | Performed by: UROLOGY

## 2020-07-31 PROCEDURE — 88305 TISSUE EXAM BY PATHOLOGIST: CPT | Mod: 59 | Performed by: PATHOLOGY

## 2020-07-31 RX ORDER — LIDOCAINE HYDROCHLORIDE 10 MG/ML
10 INJECTION INFILTRATION; PERINEURAL ONCE
Status: COMPLETED | OUTPATIENT
Start: 2020-07-31 | End: 2020-07-31

## 2020-07-31 RX ORDER — LIDOCAINE HYDROCHLORIDE 20 MG/ML
JELLY TOPICAL ONCE
Status: DISCONTINUED | OUTPATIENT
Start: 2020-07-31 | End: 2022-03-15

## 2020-07-31 RX ORDER — LIDOCAINE HYDROCHLORIDE 20 MG/ML
JELLY TOPICAL
Status: COMPLETED | OUTPATIENT
Start: 2020-07-31 | End: 2020-07-31

## 2020-07-31 RX ADMIN — LIDOCAINE HYDROCHLORIDE 10 ML: 10 INJECTION INFILTRATION; PERINEURAL at 10:07

## 2020-07-31 RX ADMIN — LIDOCAINE HYDROCHLORIDE: 20 JELLY TOPICAL at 10:07

## 2020-07-31 NOTE — PROCEDURES
Procedures Date: 07/31/2020     Surgeon: Kodak    Assistant: None    Procedure performed: Trans rectal ultrasound          Prostate biopsy          Ultrasound guidance for needle placement    Blood loss: Min.    Specimen: Prostate cores    Procedure in detail:   After informed consent was obtained, the patient was placed in the L lateral decubitus position.  A urojet was placed into the patient's rectum.      The ultrasound probe was then inserted into the patient's rectum.  Local anesthesia was provided by injecting 10 cc of 1% lidocaine at the junction of the SV and prostate bilaterally.    The prostate was visualized with the ultrasound and transrectal ultrasound guided biopsy of the prostate was performed in the standard fashion.  A total of 12 cores were taken.  2 cores were taken from the base, middle, and apex on each side of the prostate.    TRUS volume was 26.6 cc with no median lobe.    The patient tolerated the procedure well without complication.  He will be discharged home in stable condition.  He will follow up in 6 months with a PSA if the biopsy is negative.  Will follow up sooner if positive.

## 2020-07-31 NOTE — PATIENT INSTRUCTIONS

## 2020-08-05 ENCOUNTER — TELEPHONE (OUTPATIENT)
Dept: UROLOGY | Facility: CLINIC | Age: 55
End: 2020-08-05

## 2020-08-05 DIAGNOSIS — C61 PROSTATE CANCER: Primary | ICD-10-CM

## 2020-08-05 DIAGNOSIS — R97.20 ELEVATED PSA: ICD-10-CM

## 2020-08-05 NOTE — TELEPHONE ENCOUNTER
Recently diagnosed with PCa; (+) family history;   PSA was 8.1  bx (+):     1. Walker 8 right base  2. Sal[place] 6: R&L apex, R middle;    Informed of results.  Will get bone scan and CT ab/pelvis for staging.    Consult Dr. Perdue.  Voiced understanding

## 2020-08-13 ENCOUNTER — HOSPITAL ENCOUNTER (OUTPATIENT)
Dept: RADIOLOGY | Facility: HOSPITAL | Age: 55
Discharge: HOME | End: 2020-08-13
Attending: NURSE PRACTITIONER
Payer: COMMERCIAL

## 2020-08-13 DIAGNOSIS — R97.20 ELEVATED PSA: ICD-10-CM

## 2020-08-13 DIAGNOSIS — C61 PROSTATE CANCER: ICD-10-CM

## 2020-08-13 PROCEDURE — 78306 NM BONE SCAN WHOLE BODY: ICD-10-PCS | Mod: 26,,, | Performed by: RADIOLOGY

## 2020-08-13 PROCEDURE — 78306 BONE IMAGING WHOLE BODY: CPT | Mod: 26,,, | Performed by: RADIOLOGY

## 2020-08-13 PROCEDURE — A9503 TC99M MEDRONATE: HCPCS

## 2020-08-14 ENCOUNTER — HOSPITAL ENCOUNTER (OUTPATIENT)
Dept: RADIOLOGY | Facility: HOSPITAL | Age: 55
Discharge: HOME | End: 2020-08-14
Attending: NURSE PRACTITIONER
Payer: COMMERCIAL

## 2020-08-14 DIAGNOSIS — C61 PROSTATE CANCER: ICD-10-CM

## 2020-08-14 DIAGNOSIS — R97.20 ELEVATED PSA: ICD-10-CM

## 2020-08-14 LAB
CREAT SERPL-MCNC: 1.1 MG/DL (ref 0.5–1.4)
SAMPLE: NORMAL

## 2020-08-14 PROCEDURE — 74177 CT ABD & PELVIS W/CONTRAST: CPT | Mod: TC

## 2020-08-14 PROCEDURE — 74177 CT ABD & PELVIS W/CONTRAST: CPT | Mod: 26,,, | Performed by: RADIOLOGY

## 2020-08-14 PROCEDURE — 25500020 PHARM REV CODE 255: Performed by: NURSE PRACTITIONER

## 2020-08-14 PROCEDURE — 74177 CT ABDOMEN PELVIS WITH CONTRAST: ICD-10-PCS | Mod: 26,,, | Performed by: RADIOLOGY

## 2020-08-14 RX ADMIN — IOHEXOL 75 ML: 350 INJECTION, SOLUTION INTRAVENOUS at 02:08

## 2020-08-21 ENCOUNTER — PATIENT OUTREACH (OUTPATIENT)
Dept: ADMINISTRATIVE | Facility: OTHER | Age: 55
End: 2020-08-21

## 2020-08-21 NOTE — PROGRESS NOTES
LINKS immunization registry updated  Care Everywhere updated  Health Maintenance updated  Chart reviewed for overdue Proactive [facility] (ALEXX) health maintenance testing (CRS, Breast Ca, Diabetic Eye Exam)   Orders entered:N/A

## 2020-08-24 ENCOUNTER — TELEPHONE (OUTPATIENT)
Dept: UROLOGY | Facility: CLINIC | Age: 55
End: 2020-08-24

## 2020-08-24 ENCOUNTER — OFFICE VISIT (OUTPATIENT)
Dept: UROLOGY | Facility: CLINIC | Age: 55
End: 2020-08-24
Payer: COMMERCIAL

## 2020-08-24 VITALS
DIASTOLIC BLOOD PRESSURE: 85 MMHG | SYSTOLIC BLOOD PRESSURE: 132 MMHG | HEART RATE: 82 BPM | HEIGHT: 65 IN | BODY MASS INDEX: 28.16 KG/M2 | RESPIRATION RATE: 15 BRPM | WEIGHT: 169 LBS

## 2020-08-24 DIAGNOSIS — C61 PROSTATE CANCER: Primary | ICD-10-CM

## 2020-08-24 DIAGNOSIS — R97.20 ELEVATED PSA: ICD-10-CM

## 2020-08-24 PROCEDURE — 99215 PR OFFICE/OUTPT VISIT, EST, LEVL V, 40-54 MIN: ICD-10-PCS | Mod: S$GLB,,, | Performed by: UROLOGY

## 2020-08-24 PROCEDURE — 99215 OFFICE O/P EST HI 40 MIN: CPT | Mod: S$GLB,,, | Performed by: UROLOGY

## 2020-08-24 PROCEDURE — 3008F BODY MASS INDEX DOCD: CPT | Mod: CPTII,S$GLB,, | Performed by: UROLOGY

## 2020-08-24 PROCEDURE — 99999 PR PBB SHADOW E&M-EST. PATIENT-LVL IV: ICD-10-PCS | Mod: PBBFAC,,, | Performed by: UROLOGY

## 2020-08-24 PROCEDURE — 3075F SYST BP GE 130 - 139MM HG: CPT | Mod: CPTII,S$GLB,, | Performed by: UROLOGY

## 2020-08-24 PROCEDURE — 3075F PR MOST RECENT SYSTOLIC BLOOD PRESS GE 130-139MM HG: ICD-10-PCS | Mod: CPTII,S$GLB,, | Performed by: UROLOGY

## 2020-08-24 PROCEDURE — 3079F PR MOST RECENT DIASTOLIC BLOOD PRESSURE 80-89 MM HG: ICD-10-PCS | Mod: CPTII,S$GLB,, | Performed by: UROLOGY

## 2020-08-24 PROCEDURE — 3079F DIAST BP 80-89 MM HG: CPT | Mod: CPTII,S$GLB,, | Performed by: UROLOGY

## 2020-08-24 PROCEDURE — 99999 PR PBB SHADOW E&M-EST. PATIENT-LVL IV: CPT | Mod: PBBFAC,,, | Performed by: UROLOGY

## 2020-08-24 PROCEDURE — 3008F PR BODY MASS INDEX (BMI) DOCUMENTED: ICD-10-PCS | Mod: CPTII,S$GLB,, | Performed by: UROLOGY

## 2020-08-24 NOTE — PROGRESS NOTES
Clinic Note  8/24/2020      Subjective:         Chief Complaint:   ESTEFANI Edgar is a 55 y.o. male recently diagnosed with prostate cancer.   with [facility], covers [facility]. Strong family history (father, 2 paternal uncles, brother)  Uses PEP for erectile dysfunction since 2018. Good control.  Consult from Jazlyn Cardoso.    PSA - 7  Stage - T1C  Volume - 27 ccs (T)  MRI - n/a  Bone scan- equivocal lesion left lateral L5  CT scan- no nodes, anterolistesis of L5 on S1, no bony met noted  Biopsy - 7/30/2020  Salinas 8 right base 2/2 15%; Sal[place] 6 left apex 1/2 5%, right apex 2/2 5%, right middle 1/2  5%. Overall 6/12 cores positive  CARLOS ALBERTO Score - 6 high risk  NCCN - high risk  Germline- recommended, consult placed with Jose Luis Welch  Somatic-discussed, ordered    Lab Results   Component Value Date    PSA 7.0 (H) 06/09/2020    PSA 3.7 05/02/2019    PSA 2.4 10/27/2017    PSADIAG 8.1 (H) 07/06/2020      Past Medical History:   Diagnosis Date    Hypertension     Shoulder pain, bilateral      Family History   Problem Relation Age of Onset    Hypertension Mother     Diabetes Father     Cancer Father         prostate ca    Cancer Paternal Uncle         prostate ca    Cancer Maternal Grandfather         prostate ca    Glaucoma Neg Hx     Macular degeneration Neg Hx     Retinal detachment Neg Hx     Strabismus Neg Hx     Cataracts Neg Hx     Amblyopia Neg Hx     Blindness Neg Hx      Social History     Socioeconomic History    Marital status:      Spouse name: Not on file    Number of children: 1    Years of education: Not on file    Highest education level: Not on file   Occupational History    Occupation:    Social Needs    Financial resource strain: Not on file    Food insecurity     Worry: Not on file     Inability: Not on file    Transportation needs     Medical: Not on file     Non-medical: Not on file   Tobacco Use    Smoking status: Never Smoker    Smokeless tobacco:  Never Used   Substance and Sexual Activity    Alcohol use: Yes     Comment: once in a while    Drug use: No    Sexual activity: Not Currently   Lifestyle    Physical activity     Days per week: Not on file     Minutes per session: Not on file    Stress: Not on file   Relationships    Social connections     Talks on phone: Not on file     Gets together: Not on file     Attends Anabaptism service: Not on file     Active member of club or organization: Not on file     Attends meetings of clubs or organizations: Not on file     Relationship status: Not on file   Other Topics Concern    Not on file   Social History Narrative    Not on file     Past Surgical History:   Procedure Laterality Date    COLONOSCOPY N/A 8/2/2018    Procedure: COLONOSCOPY;  Surgeon: Saeid Velazquez MD;  Location: [address]);  Service: Endoscopy;  Laterality: N/A;    HERNIA REPAIR       Patient Active Problem List   Diagnosis    Essential hypertension    Erectile dysfunction due to arterial insufficiency    BPH with urinary obstruction    Prediabetes    BMI 29.0-29.9,adult    Prostate cancer     Review of Systems   Constitutional: Negative for appetite change, chills, fatigue, fever and unexpected weight change.   HENT: Negative for nosebleeds.    Respiratory: Negative for shortness of breath and wheezing.    Cardiovascular: Negative for chest pain, palpitations and leg swelling.   Gastrointestinal: Negative for abdominal distention, abdominal pain, constipation, diarrhea, nausea and vomiting.   Genitourinary: Negative for dysuria and hematuria.   Musculoskeletal: Negative for arthralgias and back pain.   Skin: Negative for pallor.   Neurological: Negative for dizziness, seizures and syncope.   Hematological: Negative for adenopathy.   Psychiatric/Behavioral: Negative for dysphoric mood.         Objective:      There were no vitals taken for this visit.  Estimated body mass index is 28.07 kg/m² as calculated from the  "following:    Height as of 7/31/20: 5' 5" (1.651 m).    Weight as of 7/31/20: 76.5 kg (168 lb 11.2 oz).  Physical Exam   Constitutional: He is oriented to person, place, and time. He appears well-developed. No distress.   HENT:   Head: Atraumatic.   Neck: No tracheal deviation present.   Cardiovascular: Normal rate.    Pulmonary/Chest: Effort normal. No respiratory distress. He has no wheezes.   Abdominal: Soft. Bowel sounds are normal. He exhibits no distension and no mass. There is no abdominal tenderness. There is no rebound and no guarding.   Neurological: He is alert and oriented to person, place, and time.   Skin: Skin is warm and dry. He is not diaphoretic.     Psychiatric: His behavior is normal. Judgment and thought content normal.         Assessment and Plan:           Problem List Items Addressed This Visit     Prostate cancer - Primary          Follow up:   Consult Jose Luis Welch to discuss germline testing.  Xuan  Today's visit was spent almost entirely on counseling. We reviewed his diagnosis, stage, grade, risk group, and prognosis. We discussed [place] (NCCN) and CARLOS ALBERTO risk stratification  We discussed the concept of low risk, intermediate risk, and high risk disease. We also reviewed the NCCN treatment nomogram.We discussed the different treatment options including active surveillance (as well as the surveillance protocol), prostate brachytherapy, IMRT, IGRT, cryotherapy, HIFU and both open and robotic prostatectomy.We also discussed the advantages, disadvantages, risks and benefits, as well as complications of each option. Regarding radiation therapy we discussed treatment planning, the different techniques, short and long term complications. These included radiation cystitis, radiation proctitis, and impotence. We discussed success, failure, and salvage therapeutic options.Also discussed the use of SpaceOAR for brachytherapy and EBRT and fiducials for EBRT.   We discussed surgical therapy in depth " including preoperative preparation, surgical technique (including bladder neck and nerve-sparing techniques), postoperative recuperation and recovery, and short and long term complications including UTI, bleeding, blood clots,catheter dislodgement, etc. We discussed the risks of reoperation, incontinence, impotence, and recurrence. We discussed preop and postop Kegels, post op penile rehab, and treatment options for incontinence and impotence. We discussed rates of cancer free survival and recurrence, as well as salvage therapeutic options. We discussed the possible  indications for adjuvant radiation therapy.   I answered questions and addressed concerns. Also discussed the Prolaris test to get genetic information about this tumors potential future behavior.   Letter to Jazlyn Cardoso, Dr Rodriguez.  My nurse will instruct the patient on Kegel exercises today and give them the Kegel exercise instruction sheet.   The patient will meet with our  Melody today to find a date for surgery and begin preparation for surgery. Will also receive our handout on NPO guidelines and preop hydration. Patient will also receive information and education on preoperative skin preparation and postop wound care.   I spent over 45 minutes with the patient. Over 50% of the visit was spent in counseling.     Marshall Perdue

## 2020-08-24 NOTE — LETTER
August 24, 2020        Jazlyn Cardoso NP  [address] [facility] [address]             [facility]  [address]  [place] [address]  Phone: [phone]   Patient: Roberto Edgar   MR Number: [id number]   YOB: 1965   Date of Visit: 8/24/2020       Dear Dr. Cardoso:    Thank you for referring Roberto Edgar to me for evaluation. Attached you will find relevant portions of my assessment and plan of care.    If you have questions, please do not hesitate to call me. I look forward to following Roberto Edgar along with you.    Sincerely,      Marshall Perdue MD            CC  No Recipients    Enclosure

## 2020-08-24 NOTE — LETTER
August 24, 2020        Jose Luis Welch, RYLEY  [address] [facility] [address]             [facility]  [address]  [place] [address]  Phone: [phone]   Patient: Roberto Edgar   MR Number: [id number]   YOB: 1965   Date of Visit: 8/24/2020       Dear Dr. Welch:    Thank you for referring Roberto Edgar to me for evaluation. Attached you will find relevant portions of my assessment and plan of care.    If you have questions, please do not hesitate to call me. I look forward to following Roberto Edgar along with you.    Sincerely,      Marshall Perdue MD            CC  No Recipients    Enclosure

## 2020-08-26 ENCOUNTER — TELEPHONE (OUTPATIENT)
Dept: HEMATOLOGY/ONCOLOGY | Facility: CLINIC | Age: 55
End: 2020-08-26

## 2020-08-27 ENCOUNTER — TELEPHONE (OUTPATIENT)
Dept: GENETICS | Facility: CLINIC | Age: 55
End: 2020-08-27

## 2020-08-27 NOTE — TELEPHONE ENCOUNTER
----- Message from Karina Amador RN sent at 8/27/2020 10:49 AM CDT -----  Regarding: dayanna Martines    Please call the above pt to schedule an appointment.    Mother did not have # for pt to call.    Please call ASAP, [phone]    Thank you  Karina SHAFER

## 2020-08-27 NOTE — TELEPHONE ENCOUNTER
Attempted to contact pt in regards to referral from Dr. Perdue. Pt's home number is invalid. LM with pt's mom to give us a call back to schedule.

## 2020-09-02 ENCOUNTER — OFFICE VISIT (OUTPATIENT)
Dept: HEMATOLOGY/ONCOLOGY | Facility: CLINIC | Age: 55
End: 2020-09-02
Payer: COMMERCIAL

## 2020-09-02 ENCOUNTER — TELEPHONE (OUTPATIENT)
Dept: HEMATOLOGY/ONCOLOGY | Facility: CLINIC | Age: 55
End: 2020-09-02

## 2020-09-02 ENCOUNTER — LAB VISIT (OUTPATIENT)
Dept: LAB | Facility: HOSPITAL | Age: 55
End: 2020-09-02
Payer: COMMERCIAL

## 2020-09-02 DIAGNOSIS — Z80.42 FAMILY HISTORY OF PROSTATE CANCER: ICD-10-CM

## 2020-09-02 DIAGNOSIS — Z71.83 ENCOUNTER FOR NONPROCREATIVE GENETIC COUNSELING: Primary | ICD-10-CM

## 2020-09-02 DIAGNOSIS — C61 PROSTATE CANCER: ICD-10-CM

## 2020-09-02 DIAGNOSIS — Z80.0 FAMILY HISTORY OF PANCREATIC CANCER: ICD-10-CM

## 2020-09-02 PROCEDURE — 99999 PR PBB SHADOW E&M-EST. PATIENT-LVL II: CPT | Mod: PBBFAC,,, | Performed by: NURSE PRACTITIONER

## 2020-09-02 PROCEDURE — 99205 OFFICE O/P NEW HI 60 MIN: CPT | Mod: S$GLB,,, | Performed by: NURSE PRACTITIONER

## 2020-09-02 PROCEDURE — 36415 COLL VENOUS BLD VENIPUNCTURE: CPT

## 2020-09-02 PROCEDURE — 99999 PR PBB SHADOW E&M-EST. PATIENT-LVL II: ICD-10-PCS | Mod: PBBFAC,,, | Performed by: NURSE PRACTITIONER

## 2020-09-02 PROCEDURE — 99205 PR OFFICE/OUTPT VISIT, NEW, LEVL V, 60-74 MIN: ICD-10-PCS | Mod: S$GLB,,, | Performed by: NURSE PRACTITIONER

## 2020-09-02 NOTE — TELEPHONE ENCOUNTER
"----- Message from Tyesha Martel sent at 9/2/2020  8:38 AM CDT -----  Regarding: Genetic screening req.  Patient Assist    Name of caller:  Roberto   Provider name:   Contact Preference:  [phone]   Current patient or new patient?: current   Does Patient feel the need to see the MD today? No   What is the nature of the call?    - pt called, he was online trying to schedule a 11a.m appt today for genetic tesing  Please call if the spot is still available, he would like to see Jose Luis Welch today.     Additional Notes:   "Thank you for all that you do for our patients'"          "

## 2020-09-02 NOTE — TELEPHONE ENCOUNTER
"Message fwd to NP.     ----- Message from Tyesha Martel sent at 9/2/2020  8:38 AM CDT -----  Regarding: Genetic screening req.  Patient Assist    Name of caller:  Roberto   Provider name:   Contact Preference:  [phone]   Current patient or new patient?: current   Does Patient feel the need to see the MD today? No   What is the nature of the call?    - pt called, he was online trying to schedule a 11a.m appt today for genetic tesing  Please call if the spot is still available, he would like to see Jose Luis Welch today.     Additional Notes:   "Thank you for all that you do for our patients'"            "

## 2020-09-02 NOTE — PROGRESS NOTES
"Hereditary and High-Risk Clinic  Department of Hematology and Oncology  [facility]  [facility] [place], [place]     09/02/2020  Provider:  Jose Luis Welch DNP    Patient ID: Roberto Edgar is a 55 y.o. male.      Chief Complaint: Genetic Evaluation      Referring Provider:  Marshall Perdue MD  [address] [facility] [place] [address]    SUBJECTIVE:      History of Present Illness (HPI):  New patient presents today for an evaluation as it pertains to hereditary cancer syndromes.    Pedigree          Review of Systems   - See HPI.    - Patient's Distress Score today was 5/10 (with 10 being the worst).  Patient attributes this to prostate cancer diagnosis.  Patient denies experiencing suicidal or homicidal ideations (SI/HI); advised patient to call 911 with any SI/HI.  Offered patient a referral to Oncology Social Work and Oncology Psychology, and patient declined both; advised patient to notify me if he wishes for the referral(s) moving forward.    OBJECTIVE:     Physical Exam   Constitutional: He appears well developed and well nourished. No distress.   Pulmonary/Chest: Effort normal.   Neurological: He is alert.   Psychiatric: He has a normal mood and affect. His speech is normal and behavior is normal. His thought content is normal.     COUNSELING:     Based on the information provided to me by the patient, Roberto Edgar, he meets criteria for genetic testing for Breast and Ovarian Cancer Susceptibility Genes and Pancreatic Cancer Susceptibility Genes based on current National Comprehensive Cancer Network (NCCN) Guidelines, and he also meets clinical recommendations for germline genetic testing for prostate cancer.     Germline genetic oncology panel testing consists of testing multiple genes known to be related to hereditary cancer syndromes.  These genes are known as "tumor suppressor genes."  A key role of tumor suppressor genes is to prevent cancer.  When a tumor " suppressor gene has a clinically significant mutation, it affects the functioning of the gene, and the carrier may be more likely to develop cancers in certain organs.      Only some cancers are hereditary.  When a gene mutation is not identified, it does not completely rule out the possibility of hereditary cancers but does make them less likely.  Additionally, it is possible to see familial clustering of related cancer amongst family members, and these are sometimes caused by environmental factors and/or lifestyle factors that may be shared amongst family members.  Finally, cancers can also be sporadic.     Results of genetic testing include positive, negative, and variant of uncertain significance (VUS) (i.e. unclear) results.  If positive, the patient's specific cancer risks vary depending upon the tumor suppressor gene(s) in which there is/are a mutation(s).  In some cases, depending upon the result and the patient's clinical history, modified management may be recommended, including measures for risk reduction and/or surveillance, though modified management is not always an option.  Modified management may also be recommended, even with a negative result, based upon risk assessment that incorporates the family history.  A VUS indicates that there is not presently enough data for the laboratory to make a determination as to whether the variant is benign or pathogenic; VUSs are not typically acted upon clinically.       Sometimes, depending upon the genetic testing result and the cancer diagnosis, additional/modified treatments may be an option, though this is not guaranteed.     If Roberto tests positive for a mutation inherited in an autosomal-dominant manner, his first-degree relatives would each have a 50% chance of having the same mutation, and other blood-relatives would also be at (a lesser) risk of having the same mutation.       The Genetic Information Nondiscrimination Act ([place]) prohibits most health  insurance agencies and employers with 16 or more employees from discriminating against an individual based on the results of genetic testing; however, [place] does not protect individuals from discrimination by other types of policies/entities, including but not limited to life insurance, disability, long-term care insurance,  benefits or insurance, and [facility] benefits).     An outside laboratory would perform the testing after a blood sample is collected at an [facility] laboratory or a saliva sample is collected by the patient at home.  With genetic testing, there is a potential for the patient to incur out-of-pocket costs.  Results can take several weeks.       Offered Roberto germline genetic oncology testing today, versus deferring testing at this time or declining testing altogether.  Roberto desires to proceed with germline genetic oncology testing today and has provided informed consent to proceed with the test(s) indicated in the plan as below, after a discussion regarding various genetic testing panels that could be performed as well as associated risks.     Post-test genetic counseling will be conducted once the genetic testing results are available.     Questions were encouraged and answered to the patient's satisfaction, and he verbalized understanding of information and agreement with the plan.       ASSESSMENT/PLAN:       Roberto was seen today for genetic evaluation.    Diagnoses and all orders for this visit:    Encounter for nonprocreative genetic counseling  Based on the information provided to me by the patient, Roberto Edgar, he meets criteria for genetic testing for Breast and Ovarian Cancer Susceptibility Genes and Pancreatic Cancer Susceptibility Genes based on current National Comprehensive Cancer Network (NCCN) Guidelines, and he also meets clinical recommendations for germline genetic testing for prostate cancer.        - A blood sample for the [facility] Prostate Cancer  ""Detect" test (84 genes + the preliminary-evidence genes) was collected by [facility] today, with results expected in approximately 2-3 weeks, at which point post-test genetic counseling is to be conducted either in person or via audiovisual virtual visit.    Prostate cancer  -     Ambulatory referral/consult to Genetics: COMPLETED  -     Genetic Misc Sendout Test, Blood; Future    Family history of prostate cancer  -     Genetic Misc Sendout Test, Blood; Future    Family history of pancreatic cancer  -     Genetic Misc Sendout Test, Blood; Future        Follow up in about 3 weeks (around 9/23/2020) for post-test genetics visit.       During this encounter, I spent approximately 65 minutes face-to-face with this patient, more than half of which was spent counseling the patient and/or coordinating his care.    Jose Luis Kelly, DNP, APRN, FNP-BC, AOCNP  Nurse Practitioner, Hereditary and High-Risk Clinic  Department of Hematology and Oncology  [facility], 3rd floor  [facility] [address], [place]  [address]  office phone: [phone]    office fax: [phone]     09/02/2020  "

## 2020-09-02 NOTE — LETTER
September 2, 2020      Marshall Perdue MD  [address]  [facility] [address]           [facility] Dil[facility] - Hereditary and High Risk  [address]  [facility] [address]  Phone: [phone]  Fax: [phone]          Patient: Roberto Edgar   MR Number: [id number]   YOB: 1965   Date of Visit: 9/2/2020       Dear Dr. Marshall Perdue:    Thank you for referring Roberto Edgar to me for evaluation. Attached you will find relevant portions of my assessment and plan of care.    If you have questions, please do not hesitate to call me. I look forward to following Roberto Edgar along with you.    Sincerely,    Jose Luis Welch, DNP    Enclosure  CC:  No Recipients    If you would like to receive this communication electronically, please contact [email or web address] or [phone] to request more information on [employer] access.    For providers and/or their staff who would like to refer a patient to [facility], please contact us through our one-stop-shop provider referral line, [facility], at [phone].    If you feel you have received this communication in error or would no longer like to receive these types of communications, please e-mail [email or web address]

## 2020-09-14 ENCOUNTER — TELEPHONE (OUTPATIENT)
Dept: PREADMISSION TESTING | Facility: HOSPITAL | Age: 55
End: 2020-09-14

## 2020-09-14 NOTE — ANESTHESIA PAT ROS NOTE
09/14/2020  Roberto Edgar is a 55 y.o., male.      Pre-op Assessment          Review of Systems  Anesthesia Hx:  No problems with previous Anesthesia  History of prior surgery of interest to airway management or planning: Previous anesthesia: MAC  2018 colonoscopy with MAC.  Procedure performed at an [facility] Facility. Denies Family Hx of Anesthesia complications.   Denies Personal Hx of Anesthesia complications.   Social:  Non-Smoker, Social Alcohol Use    Hematology/Oncology:  Hematology Normal       Prostate Current/Recent Cancer. (PSA increased-biopsy positive)   EENT/Dental:EENT/Dental Normal   Cardiovascular:    Denies Angina.  Functional Capacity 6-7 METS  Hypertension , Recent typical clinic B/P of 130/70    Pulmonary:   Denies Shortness of breath.  Denies Recent URI.  Possible Obstructive Sleep Apnea , (STOP/BANG) Symptoms A - Age > 50 and G - Gender (Male > Female)        Renal/:  Renal/ Normal     Hepatic/GI:  Hepatic/GI Normal    Musculoskeletal:  Musculoskeletal Normal    Neurological:  Neurology Normal    Endocrine:   PreDM HgbA1c 6.4 on 10/14/20   Psych:  Psychiatric Normal           Physical Exam  General:  Well nourished    Airway/Jaw/Neck:  Airway Findings: Mouth Opening: Normal Tongue: Normal  Jaw/Neck Findings:  Neck ROM: Normal ROM      Dental:  Dental Findings: In tact   Chest/Lungs:  Chest/Lungs Findings: Normal Respiratory Rate, Clear to auscultation     Heart/Vascular:  Heart Findings: Rate: Normal        Mental Status:  Mental Status Findings:  Cooperative, Alert and Oriented       10/15/20 Lab and EKG results noted.    Anesthesia Assessment: Preoperative EQUATION    Planned Procedure: Procedure(s) (LRB):  XI ROBOTIC PROSTATECTOMY (N/A)  Requested Anesthesia Type:General  Surgeon: Marshall Perdue MD  Service: Urology  Known or anticipated Date of Surgery:10/19/2020    Surgeon  notes: reviewed    Previous anesthesia records:GETA and No problems   8/2018 Coloscopy  Airway/Jaw/Neck:  Airway Findings: Mouth Opening: Normal Tongue: Normal  General Airway Assessment: Adult  TM Distance: Normal, at least 6 cm       Last PCP note: 3-6 months ago , within [facility]   Subspecialty notes: Hematology/Oncology, Urology    Other important co-morbidities:Per Epic: HTN and Prediabetes, BPH, Prostate CA      Tests already available:  Available tests,  3-6 months ago , within [facility] .   6/9/2020 A1c, Lipid Panel, TSH, CBC, CMP             Instructions given. (See in Nurse's note)    Optimization:  Anesthesia Preop Clinic Assessment  Indicated.        Plan:    Testing:  A1C, CMP, EKG, Hematology Profile and T&S   Pre-anesthesia  visit       Visit focus: concerns in complex and/or prolonged anesthesia, position other than supine      Patient  has previously scheduled Medical Appointment: 10/14/2020    Navigation: Tests Scheduled.              Consults scheduled.             Results will be tracked by Preop Clinic.

## 2020-09-14 NOTE — TELEPHONE ENCOUNTER
----- Message from Larissa Calderon RN sent at 9/14/2020  3:08 PM CDT -----  Surgery date:  10/19/2020  PreOp appt: 10/14/2020    Please call Pt and schedule the following preop appts:    POC  Lab  EKG    Thank you!  Larissa

## 2020-09-21 ENCOUNTER — PATIENT MESSAGE (OUTPATIENT)
Dept: HEMATOLOGY/ONCOLOGY | Facility: CLINIC | Age: 55
End: 2020-09-21

## 2020-09-22 ENCOUNTER — PATIENT MESSAGE (OUTPATIENT)
Dept: HEMATOLOGY/ONCOLOGY | Facility: CLINIC | Age: 55
End: 2020-09-22

## 2020-09-25 LAB
FINAL PATHOLOGIC DIAGNOSIS: NORMAL
GROSS: NORMAL
SUPPLEMENTAL DIAGNOSIS: NORMAL

## 2020-10-01 ENCOUNTER — PATIENT MESSAGE (OUTPATIENT)
Dept: HEMATOLOGY/ONCOLOGY | Facility: CLINIC | Age: 55
End: 2020-10-01

## 2020-10-02 ENCOUNTER — OFFICE VISIT (OUTPATIENT)
Dept: HEMATOLOGY/ONCOLOGY | Facility: CLINIC | Age: 55
End: 2020-10-02
Payer: COMMERCIAL

## 2020-10-02 VITALS
WEIGHT: 175.5 LBS | HEART RATE: 65 BPM | HEIGHT: 65 IN | BODY MASS INDEX: 29.24 KG/M2 | TEMPERATURE: 98 F | DIASTOLIC BLOOD PRESSURE: 88 MMHG | SYSTOLIC BLOOD PRESSURE: 125 MMHG

## 2020-10-02 DIAGNOSIS — C61 PROSTATE CANCER: ICD-10-CM

## 2020-10-02 DIAGNOSIS — Z71.83 ENCOUNTER FOR NONPROCREATIVE GENETIC COUNSELING: Primary | ICD-10-CM

## 2020-10-02 DIAGNOSIS — Z00.00 GENERAL MEDICAL EXAM: ICD-10-CM

## 2020-10-02 PROCEDURE — 3079F DIAST BP 80-89 MM HG: CPT | Mod: CPTII,S$GLB,, | Performed by: NURSE PRACTITIONER

## 2020-10-02 PROCEDURE — 99999 PR PBB SHADOW E&M-EST. PATIENT-LVL IV: CPT | Mod: PBBFAC,,, | Performed by: NURSE PRACTITIONER

## 2020-10-02 PROCEDURE — 3079F PR MOST RECENT DIASTOLIC BLOOD PRESSURE 80-89 MM HG: ICD-10-PCS | Mod: CPTII,S$GLB,, | Performed by: NURSE PRACTITIONER

## 2020-10-02 PROCEDURE — 3074F PR MOST RECENT SYSTOLIC BLOOD PRESSURE < 130 MM HG: ICD-10-PCS | Mod: CPTII,S$GLB,, | Performed by: NURSE PRACTITIONER

## 2020-10-02 PROCEDURE — 99212 PR OFFICE/OUTPT VISIT, EST, LEVL II, 10-19 MIN: ICD-10-PCS | Mod: S$GLB,,, | Performed by: NURSE PRACTITIONER

## 2020-10-02 PROCEDURE — 99999 PR PBB SHADOW E&M-EST. PATIENT-LVL IV: ICD-10-PCS | Mod: PBBFAC,,, | Performed by: NURSE PRACTITIONER

## 2020-10-02 PROCEDURE — 3008F BODY MASS INDEX DOCD: CPT | Mod: CPTII,S$GLB,, | Performed by: NURSE PRACTITIONER

## 2020-10-02 PROCEDURE — 3008F PR BODY MASS INDEX (BMI) DOCUMENTED: ICD-10-PCS | Mod: CPTII,S$GLB,, | Performed by: NURSE PRACTITIONER

## 2020-10-02 PROCEDURE — 3074F SYST BP LT 130 MM HG: CPT | Mod: CPTII,S$GLB,, | Performed by: NURSE PRACTITIONER

## 2020-10-02 PROCEDURE — 99212 OFFICE O/P EST SF 10 MIN: CPT | Mod: S$GLB,,, | Performed by: NURSE PRACTITIONER

## 2020-10-02 NOTE — PROGRESS NOTES
"Hereditary and High-Risk Clinic  Department of Hematology and Oncology  [facility]  [facility] [place], [place]    10/2/2020   Provider:  Jose Luis Welch DNP    Patient ID: Roberto Edgar is a 55 y.o. male.    Chief Complaint: Results      Referring Provider:  Marshall Perdue MD    SUBJECTIVE:      History of Present Illness (HPI):  Established patient presents today for post-test genetic counseling.    Pedigree        Review of Systems   - See HPI.    - Patient's Distress Score today was 0/10 (with 10 being the worst).      OBJECTIVE:     Physical Exam   Constitutional: She appears well developed and well nourished. No distress.   Pulmonary/Chest: Effort normal.   Neurological: She is alert.   Psychiatric: She has a normal mood and affect. Her speech is normal and behavior is normal. Her thought content is normal.     Germline Cancer-Genetic Testing  · Test(s) performed:  [facility] Multi-Cancer Panel, [facility] Prostate Cancer Panel, Add-on Preliminary-evidence genes for Prostate Cancer  Number of genes analyzed:  87  Laboratory:  [facility]  Sample collection date:  09/02/2020  Results report date:  09/22/2020              Full report to be scanned into Epic under the Labs and/or Media tab(s).   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  A VUS indicates the presence of a variation in the gene's sequencing, without enough data, presently, to classify it as being benign versus pathogenic.  If [facility] reclassifies a VUS as "benign" or "pathogenic," at that time, [facility] should provide notification.  [facility] does not recommend familial VUS testing for the patient's VUS; however, it may be offered, " so patient is encouraged to let his family members know.  I provided Roberto with a hard copy of his complete results report today.       These results do not completely rule out the possibility of a hereditary cancer, nor do they necessarily indicate that the patient is not at increased risk for certain cancers, as he may share other risk factors, such as environmental and lifestyle factors, with his family members affected by cancer.       Roberto is to ensure that his healthcare providers are aware of his personal and family histories, especially of cancers, so that his medical management including cancer screenings can be based in part off of these histories.      Roberto is established with primary care provider (PCP) Dr. Coral Rodriguez.     I recommend that the patient undergo total-body skin examinations annually with a Dermatology provider.  Roberto is not established with a Dermatology provider.  I have placed a referral to Dermatology for the patient.    The patient's relatives should consider speaking with a healthcare provider regarding undergoing genetic counseling/testing; I am available for this purpose, or they can visit [email or web address] to locate a local genetic counselor, and I have shared the contact information for both with the patient and encouraged him to share that information with his relatives.     Roberto should update me with any changes to his personal or family history and with any relative's genetic testing results and check in with me annually to determine if updated genetic testing is indicated for him.     Questions were encouraged and answered to the patient's satisfaction, and he verbalized understanding of information and agreement with the plan.     ASSESSMENT/PLAN:       Roberto was seen today for results.    Diagnoses and all orders for this visit:    Encounter for nonprocreative genetic counseling  Roberto's germline cancer-genetic testing was negative for any clinically significant  mutation; however, a variant of uncertain significance (VUS) was identified in his EGFR gene, as detailed above.  A VUS indicates the presence of a variation in the gene's sequencing, without enough data, presently, to classify it as being benign versus pathogenic        - RTC in 1 year.    Prostate cancer        - Patient is under the care of Dr. Perdue.    General medical exam  Patient needs TBSE and to establish dermatologic care.  -     Ambulatory referral/consult to Dermatology; Future        Follow up in about 1 year (around 10/2/2021) for determination of need for updated genetic testing.      During this encounter, I spent approximately 13 minutes face-to-face with this patient, more than half of which was spent counseling the patient and/or coordinating his care.    Signed,        Jose Luis Welch, DNP, APRN, FNP-BC, AOCNP  Nurse Practitioner, Hereditary and High-Risk Clinic  Department of Hematology and Oncology  [facility], 3rd floor  [facility] [address], [place]  [address]  office phone: [phone]    office fax: [phone]     10/02/2020

## 2020-10-04 ENCOUNTER — PATIENT OUTREACH (OUTPATIENT)
Dept: ADMINISTRATIVE | Facility: OTHER | Age: 55
End: 2020-10-04

## 2020-10-05 ENCOUNTER — OFFICE VISIT (OUTPATIENT)
Dept: UROLOGY | Facility: CLINIC | Age: 55
End: 2020-10-05
Payer: COMMERCIAL

## 2020-10-05 VITALS
TEMPERATURE: 99 F | DIASTOLIC BLOOD PRESSURE: 80 MMHG | BODY MASS INDEX: 28.99 KG/M2 | WEIGHT: 174 LBS | SYSTOLIC BLOOD PRESSURE: 138 MMHG | HEART RATE: 76 BPM | RESPIRATION RATE: 18 BRPM | HEIGHT: 65 IN

## 2020-10-05 DIAGNOSIS — N52.01 ERECTILE DYSFUNCTION DUE TO ARTERIAL INSUFFICIENCY: ICD-10-CM

## 2020-10-05 DIAGNOSIS — C61 PROSTATE CANCER: Primary | ICD-10-CM

## 2020-10-05 PROCEDURE — 3075F PR MOST RECENT SYSTOLIC BLOOD PRESS GE 130-139MM HG: ICD-10-PCS | Mod: CPTII,S$GLB,, | Performed by: UROLOGY

## 2020-10-05 PROCEDURE — 3008F PR BODY MASS INDEX (BMI) DOCUMENTED: ICD-10-PCS | Mod: CPTII,S$GLB,, | Performed by: UROLOGY

## 2020-10-05 PROCEDURE — 3079F DIAST BP 80-89 MM HG: CPT | Mod: CPTII,S$GLB,, | Performed by: UROLOGY

## 2020-10-05 PROCEDURE — 3075F SYST BP GE 130 - 139MM HG: CPT | Mod: CPTII,S$GLB,, | Performed by: UROLOGY

## 2020-10-05 PROCEDURE — 3079F PR MOST RECENT DIASTOLIC BLOOD PRESSURE 80-89 MM HG: ICD-10-PCS | Mod: CPTII,S$GLB,, | Performed by: UROLOGY

## 2020-10-05 PROCEDURE — 99213 PR OFFICE/OUTPT VISIT, EST, LEVL III, 20-29 MIN: ICD-10-PCS | Mod: S$GLB,,, | Performed by: UROLOGY

## 2020-10-05 PROCEDURE — 3008F BODY MASS INDEX DOCD: CPT | Mod: CPTII,S$GLB,, | Performed by: UROLOGY

## 2020-10-05 PROCEDURE — 99999 PR PBB SHADOW E&M-EST. PATIENT-LVL III: ICD-10-PCS | Mod: PBBFAC,,, | Performed by: UROLOGY

## 2020-10-05 PROCEDURE — 99213 OFFICE O/P EST LOW 20 MIN: CPT | Mod: S$GLB,,, | Performed by: UROLOGY

## 2020-10-05 PROCEDURE — 99999 PR PBB SHADOW E&M-EST. PATIENT-LVL III: CPT | Mod: PBBFAC,,, | Performed by: UROLOGY

## 2020-10-05 NOTE — PROGRESS NOTES
Clinic Note  10/5/2020      Subjective:         Chief Complaint:   HPI  Roberto Edgar is a 55 y.o. male diagnosed with prostate cancer.   with [facility], covers [facility]. Strong family history (father, 2 paternal uncles,cousin, brother)  Uses PEP for erectile dysfunction since 2018. Good control.  Had long talk on 8/24, scheduled for robotic assisted laparoscopic prostatectomy on 10/19. Has more questions.     PSA - 7  Stage - T1C  Volume - 27 ccs (T)  MRI - n/a  Bone scan- equivocal lesion left lateral L5  CT scan- no nodes, anterolistesis of L5 on S1, no bony met noted  Biopsy - 7/30/2020  [place] 8 right base 2/2 15%; [place] 6 left apex 1/2 5%, right apex 2/2 5%, right middle 1/2  5%. Overall 6/12 cores positive  CARLOS ALBERTO Score - 6 high risk  NCCN - high risk  Germline- recommended, consult placed with Jose Luis Welch  Somatic-discussed, ordered         Lab Results   Component Value Date    PSA 7.0 (H) 06/09/2020    PSA 3.7 05/02/2019    PSA 2.4 10/27/2017    PSADIAG 8.1 (H) 07/06/2020      Past Medical History:   Diagnosis Date    Erectile dysfunction due to arterial insufficiency 5/24/2018    Hypertension     Prediabetes 5/3/2019    Prostate cancer 8/24/2020    Shoulder pain, bilateral      Family History   Problem Relation Age of Onset    Hypertension Mother     Other Mother         s/p hysterectomy for reason unknown to patient    Diabetes Father     Prostate cancer Father 60        underwent chemo; no known mets    Diabetes Maternal Grandmother     Heart disease Maternal Aunt         cardiomyopathy?    Diabetes Maternal Aunt     Multiple sclerosis Maternal Cousin     Kidney failure Maternal Cousin     Other Maternal Cousin         overweight    Diabetes Paternal Grandmother     Pneumonia Paternal Grandfather     Prostate cancer Paternal Grandfather         unknown whether underwent chemo or had mets, though mets not suspected; not cause of death    Down syndrome Paternal Cousin      Prostate cancer Paternal Cousin     Prostate cancer Paternal Cousin     Hydrocephalus Brother     Breast cancer Other 50    Prostate cancer Other         stage IV; cause of death    Pancreatic cancer Other     Prostate cancer Paternal Uncle 83        unknown whether underwent chemo; possible mets    Prostate cancer Paternal Uncle 83        unknown whether underwent chemo; unknown whether had mets    Glaucoma Neg Hx     Macular degeneration Neg Hx     Retinal detachment Neg Hx     Strabismus Neg Hx     Cataracts Neg Hx     Amblyopia Neg Hx     Blindness Neg Hx      Social History     Socioeconomic History    Marital status:      Spouse name: Not on file    Number of children: 1    Years of education: Not on file    Highest education level: Not on file   Occupational History    Occupation:    Social Needs    Financial resource strain: Not on file    Food insecurity     Worry: Not on file     Inability: Not on file    Transportation needs     Medical: Not on file     Non-medical: Not on file   Tobacco Use    Smoking status: Never Smoker    Smokeless tobacco: Never Used   Substance and Sexual Activity    Alcohol use: Yes     Comment: once in a while    Drug use: No    Sexual activity: Not Currently   Lifestyle    Physical activity     Days per week: Not on file     Minutes per session: Not on file    Stress: Not on file   Relationships    Social connections     Talks on phone: Not on file     Gets together: Not on file     Attends Mandaeism service: Not on file     Active member of club or organization: Not on file     Attends meetings of clubs or organizations: Not on file     Relationship status: Not on file   Other Topics Concern    Not on file   Social History Narrative    Not on file     Past Surgical History:   Procedure Laterality Date    COLONOSCOPY N/A 8/2/2018    Procedure: COLONOSCOPY;  Surgeon: Saeid Velazquez MD;  Location: [facility] ([address] [place] [address]);  Service:  "Endoscopy;  Laterality: N/A;    HERNIA REPAIR       Patient Active Problem List   Diagnosis    Essential hypertension    Erectile dysfunction due to arterial insufficiency    BPH with urinary obstruction    Prediabetes    BMI 29.0-29.9,adult    Prostate cancer     Review of Systems      Objective:      There were no vitals taken for this visit.  Estimated body mass index is 29.2 kg/m² as calculated from the following:    Height as of 10/2/20: 5' 5" (1.651 m).    Weight as of 10/2/20: 79.6 kg (175 lb 7.8 oz).  Physical Exam      Assessment and Plan:           Problem List Items Addressed This Visit     None          Follow up:     Discussed robotic assisted laparoscopic prostatectomy. Short and long term complications. Discussed incontinence and impotence. He has met with Jose Luis Welch to discuss germline testing.  Today's visit was spent almost entirely on counseling. We reviewed his diagnosis, stage, grade, risk group, and prognosis. We discussed [place] (NCCN) and CARLOS ALBERTO risk stratification  We discussed the concept of low risk, intermediate risk, and high risk disease. We also reviewed the NCCN treatment nomogram.We discussed the different treatment options including active surveillance (as well as the surveillance protocol), prostate brachytherapy, IMRT, IGRT, cryotherapy, HIFU and both open and robotic prostatectomy.We also discussed the advantages, disadvantages, risks and benefits, as well as complications of each option. Regarding radiation therapy we discussed treatment planning, the different techniques, short and long term complications. These included radiation cystitis, radiation proctitis, and impotence. We discussed success, failure, and salvage therapeutic options.Also discussed the use of SpaceOAR for brachytherapy and EBRT and fiducials for EBRT.   We discussed surgical therapy in depth including preoperative preparation, surgical technique (including bladder neck and nerve-sparing techniques), " postoperative recuperation and recovery, and short and long term complications including UTI, bleeding, blood clots,catheter dislodgement, etc. We discussed the risks of reoperation, incontinence, impotence, and recurrence. We discussed preop and postop Kegels, post op penile rehab, and treatment options for incontinence and impotence. We discussed rates of cancer free survival and recurrence, as well as salvage therapeutic options. We discussed the possible  indications for adjuvant radiation therapy.   I answered questions and addressed concerns. Also discussed the Prolaris test to get genetic information about this tumors potential future behavior.   I spent 25 minutes with the patient of which more than half was spent in direct consultation with the patient in regards to our treatment and plan.     Marshall Perdue

## 2020-10-05 NOTE — LETTER
October 5, 2020      Coral Rodriguez MD  [address]  [place] [address]           [facility] - Urology 2nd Fl  [address]  [place] [address]  Phone: [phone]          Patient: Roberto Edgar   MR Number: [id number]   YOB: 1965   Date of Visit: 10/5/2020       Dear Dr. Coral Rodriguez:    Thank you for referring Roberto Edgar to me for evaluation. Attached you will find relevant portions of my assessment and plan of care.    If you have questions, please do not hesitate to call me. I look forward to following Roberto Edgar along with you.    Sincerely,    Marshall Perdue MD    Enclosure  CC:  No Recipients    If you would like to receive this communication electronically, please contact [email or web address] or [phone] to request more information on Build[employer]ax Link access.    For providers and/or their staff who would like to refer a patient to [facility], please contact us through our one-stop-shop provider referral line, Patricio Felderge, at [phone].    If you feel you have received this communication in error or would no longer like to receive these types of communications, please e-mail [email or web address]

## 2020-10-13 LAB
GENETIC COUNSELING?: YES
GENSO SPECIMEN TYPE: NORMAL
MISCELLANEOUS GENETIC TEST NAME: NORMAL
PARTENTAL OR SIBLING TESTING?: NO
REFERENCE LAB: NORMAL
TEST RESULT: NORMAL

## 2020-10-14 ENCOUNTER — HOSPITAL ENCOUNTER (OUTPATIENT)
Dept: PREADMISSION TESTING | Facility: HOSPITAL | Age: 55
Discharge: HOME | End: 2020-10-14
Attending: ANESTHESIOLOGY
Payer: COMMERCIAL

## 2020-10-14 ENCOUNTER — HOSPITAL ENCOUNTER (OUTPATIENT)
Dept: CARDIOLOGY | Facility: CLINIC | Age: 55
Discharge: HOME | End: 2020-10-14
Attending: ANESTHESIOLOGY
Payer: COMMERCIAL

## 2020-10-14 ENCOUNTER — PATIENT MESSAGE (OUTPATIENT)
Dept: SURGERY | Facility: HOSPITAL | Age: 55
End: 2020-10-14

## 2020-10-14 ENCOUNTER — OFFICE VISIT (OUTPATIENT)
Dept: UROLOGY | Facility: CLINIC | Age: 55
End: 2020-10-14
Payer: COMMERCIAL

## 2020-10-14 ENCOUNTER — OFFICE VISIT (OUTPATIENT)
Dept: DERMATOLOGY | Facility: CLINIC | Age: 55
End: 2020-10-14
Payer: COMMERCIAL

## 2020-10-14 VITALS
HEART RATE: 69 BPM | HEIGHT: 65 IN | SYSTOLIC BLOOD PRESSURE: 130 MMHG | OXYGEN SATURATION: 97 % | TEMPERATURE: 98 F | RESPIRATION RATE: 16 BRPM | BODY MASS INDEX: 29.32 KG/M2 | WEIGHT: 176 LBS | DIASTOLIC BLOOD PRESSURE: 70 MMHG

## 2020-10-14 DIAGNOSIS — Z12.83 SKIN CANCER SCREENING: ICD-10-CM

## 2020-10-14 DIAGNOSIS — D22.9 MULTIPLE BENIGN NEVI: Primary | ICD-10-CM

## 2020-10-14 DIAGNOSIS — Z01.818 PREOPERATIVE TESTING: ICD-10-CM

## 2020-10-14 DIAGNOSIS — C61 PROSTATE CANCER: Primary | ICD-10-CM

## 2020-10-14 PROCEDURE — 93010 ELECTROCARDIOGRAM REPORT: CPT | Mod: S$GLB,,, | Performed by: INTERNAL MEDICINE

## 2020-10-14 PROCEDURE — 3074F PR MOST RECENT SYSTOLIC BLOOD PRESSURE < 130 MM HG: ICD-10-PCS | Mod: CPTII,S$GLB,, | Performed by: DERMATOLOGY

## 2020-10-14 PROCEDURE — 99999 PR PBB SHADOW E&M-EST. PATIENT-LVL III: ICD-10-PCS | Mod: PBBFAC,,, | Performed by: DERMATOLOGY

## 2020-10-14 PROCEDURE — 99999 PR PBB SHADOW E&M-EST. PATIENT-LVL III: CPT | Mod: PBBFAC,,, | Performed by: DERMATOLOGY

## 2020-10-14 PROCEDURE — 93005 EKG 12-LEAD: ICD-10-PCS | Mod: S$GLB,,, | Performed by: ANESTHESIOLOGY

## 2020-10-14 PROCEDURE — 99499 NO LOS: ICD-10-PCS | Mod: S$GLB,,, | Performed by: UROLOGY

## 2020-10-14 PROCEDURE — 3078F DIAST BP <80 MM HG: CPT | Mod: CPTII,S$GLB,, | Performed by: DERMATOLOGY

## 2020-10-14 PROCEDURE — 93010 EKG 12-LEAD: ICD-10-PCS | Mod: S$GLB,,, | Performed by: INTERNAL MEDICINE

## 2020-10-14 PROCEDURE — 3078F PR MOST RECENT DIASTOLIC BLOOD PRESSURE < 80 MM HG: ICD-10-PCS | Mod: CPTII,S$GLB,, | Performed by: DERMATOLOGY

## 2020-10-14 PROCEDURE — 99499 UNLISTED E&M SERVICE: CPT | Mod: S$GLB,,, | Performed by: UROLOGY

## 2020-10-14 PROCEDURE — 99203 PR OFFICE/OUTPT VISIT, NEW, LEVL III, 30-44 MIN: ICD-10-PCS | Mod: S$GLB,,, | Performed by: DERMATOLOGY

## 2020-10-14 PROCEDURE — 99203 OFFICE O/P NEW LOW 30 MIN: CPT | Mod: S$GLB,,, | Performed by: DERMATOLOGY

## 2020-10-14 PROCEDURE — 3074F SYST BP LT 130 MM HG: CPT | Mod: CPTII,S$GLB,, | Performed by: DERMATOLOGY

## 2020-10-14 PROCEDURE — 93005 ELECTROCARDIOGRAM TRACING: CPT | Mod: S$GLB,,, | Performed by: ANESTHESIOLOGY

## 2020-10-14 RX ORDER — HEPARIN SODIUM 5000 [USP'U]/ML
5000 INJECTION, SOLUTION INTRAVENOUS; SUBCUTANEOUS EVERY 8 HOURS
Status: CANCELLED | OUTPATIENT
Start: 2020-10-14

## 2020-10-14 RX ORDER — ACETAMINOPHEN 325 MG/1
1000 TABLET ORAL
Status: CANCELLED | OUTPATIENT
Start: 2020-10-14 | End: 2020-10-14

## 2020-10-14 RX ORDER — LIDOCAINE HYDROCHLORIDE 10 MG/ML
1 INJECTION, SOLUTION EPIDURAL; INFILTRATION; INTRACAUDAL; PERINEURAL ONCE
Status: CANCELLED | OUTPATIENT
Start: 2020-10-14 | End: 2020-10-14

## 2020-10-14 RX ORDER — KETOROLAC TROMETHAMINE 30 MG/ML
15 INJECTION, SOLUTION INTRAMUSCULAR; INTRAVENOUS
Status: CANCELLED | OUTPATIENT
Start: 2020-10-14 | End: 2020-10-14

## 2020-10-14 RX ORDER — PREGABALIN 25 MG/1
150 CAPSULE ORAL
Status: CANCELLED | OUTPATIENT
Start: 2020-10-14 | End: 2020-10-14

## 2020-10-14 NOTE — H&P (VIEW-ONLY)
Urology ([facility]) H&P for upcoming procedure  Staff:  Marshall Perdue MD    CC: prostate adencarcinoma    HPI:  Roberto Edgar is a 55 y.o. male with [id number] [place] 4+4=8 prostate adenocarcinoma.    Strong family history (father, 2 paternal uncles,cousin, brother)  Uses PEP for erectile dysfunction since 2018. Good control.  Had long talk on 8/24, scheduled for robotic assisted laparoscopic prostatectomy on 10/19. Has more questions.    TRUS biopsy revealed the following:       LEFT   RIGHT  BASE   -   4+4 (15%)  MID   -   3+3 (5%)  APEX   3+3 (5%)  3+3 (5%)    Date of Biopsy: 7/31/2020  PSA: 7.0  Volume: 27 g  Voiding complaints: absent  ED: present - papaverine  Incontinence: absent    CARLOS ALBERTO - 6 high risk  NCCN - high risk  Germline - recommended, consult placed with Jose Luis Welch  Somatic - discussed, ordered      Other medical history includes HTN (controlled) and ED. Denies cardiac or pulmonary history. No stents or MI, CVA. No MANOLO. Does not take anticoagulation. Abdominal surgeries include umbilical hernia repair as a child and right inguinal hernia repair >15 years ago.    ROS:  Neg except per HPI, specifically no bone pain, no unintentional weight loss, no anorexia, no night sweats    Past Medical History:   Diagnosis Date    Erectile dysfunction due to arterial insufficiency 5/24/2018    Hypertension     Prediabetes 5/3/2019    Prostate cancer 8/24/2020    Shoulder pain, bilateral        Past Surgical History:   Procedure Laterality Date    COLONOSCOPY N/A 8/2/2018    Procedure: COLONOSCOPY;  Surgeon: Saeid Velazquez MD;  Location: [facility] ([address] [place] [address];  Service: Endoscopy;  Laterality: N/A;    HERNIA REPAIR         Social History     Socioeconomic History    Marital status:      Spouse name: Not on file    Number of children: 1    Years of education: Not on file    Highest education level: Not on file   Occupational History    Occupation:    Social Needs    Financial  resource strain: Not on file    Food insecurity     Worry: Not on file     Inability: Not on file    Transportation needs     Medical: Not on file     Non-medical: Not on file   Tobacco Use    Smoking status: Never Smoker    Smokeless tobacco: Never Used   Substance and Sexual Activity    Alcohol use: Yes     Comment: once in a while    Drug use: No    Sexual activity: Not Currently   Lifestyle    Physical activity     Days per week: Not on file     Minutes per session: Not on file    Stress: Not on file   Relationships    Social connections     Talks on phone: Not on file     Gets together: Not on file     Attends Baptism service: Not on file     Active member of club or organization: Not on file     Attends meetings of clubs or organizations: Not on file     Relationship status: Not on file   Other Topics Concern    Not on file   Social History Narrative    Not on file       Family History   Problem Relation Age of Onset    Hypertension Mother     Other Mother         s/p hysterectomy for reason unknown to patient    Diabetes Father     Prostate cancer Father 60        underwent chemo; no known mets    Diabetes Maternal Grandmother     Heart disease Maternal Aunt         cardiomyopathy?    Diabetes Maternal Aunt     Multiple sclerosis Maternal Cousin     Kidney failure Maternal Cousin     Other Maternal Cousin         overweight    Diabetes Paternal Grandmother     Pneumonia Paternal Grandfather     Prostate cancer Paternal Grandfather         unknown whether underwent chemo or had mets, though mets not suspected; not cause of death    Down syndrome Paternal Cousin     Prostate cancer Paternal Cousin     Prostate cancer Paternal Cousin     Hydrocephalus Brother     Breast cancer Other 50    Prostate cancer Other         stage IV; cause of death    Pancreatic cancer Other     Prostate cancer Paternal Uncle 83        unknown whether underwent chemo; possible mets    Prostate  cancer Paternal Uncle 83        unknown whether underwent chemo; unknown whether had mets    Glaucoma Neg Hx     Macular degeneration Neg Hx     Retinal detachment Neg Hx     Strabismus Neg Hx     Cataracts Neg Hx     Amblyopia Neg Hx     Blindness Neg Hx        Review of patient's allergies indicates:  No Known Allergies    Current Outpatient Medications on File Prior to Visit   Medication Sig Dispense Refill    ciprofloxacin HCl (CIPRO) 500 MG tablet Take one tab night before procedure. Take one morning of procedure. Take one tab night after procedure. Take last tab next morning after procedure. (Patient not taking: Reported on 10/2/2020) 4 tablet 0    hydrocortisone 2.5 % cream Apply topically 2 (two) times daily. 20 g 2    losartan (COZAAR) 100 MG tablet Take 1 tablet (100 mg total) by mouth once daily. 30 tablet 11    papaverine 30 mg/mL injection Add: Phentolamine 10 mg Add: PGE1 1mcg Sig:  Inject 5 units as directed (Patient not taking: Reported on 10/5/2020) 10 mL 11     Current Facility-Administered Medications on File Prior to Visit   Medication Dose Route Frequency Provider Last Rate Last Dose    lidocaine HCL 2% jelly   Topical (Top) Once Jazlyn Cardoso NP           Anticoagulation:  No    Physical Exam:  BMI: 28.9    Physical Exam   Constitutional: He is oriented to person, place, and time and well-developed, well-nourished, and in no distress. No distress.   Eyes: No scleral icterus.   Cardiovascular: Normal rate and intact distal pulses.   Pulmonary/Chest: Effort normal. No respiratory distress.   Abdominal: Soft. He exhibits no distension. There is no abdominal tenderness.       Genitourinary:    Penis normal.   No discharge found.    Genitourinary Comments: Circumcised. Meatus in normal position. No meatal stenosis.  The testes, epididymides, and cord structures are normal in size and contour bilaterally.  Penis normal - no plaques, lesions, or ulcers.     Musculoskeletal: Normal  range of motion.         General: No tenderness.   Neurological: He is alert and oriented to person, place, and time.   Skin: Skin is warm and dry. No rash noted.   Psychiatric: Mood, affect and judgment normal.      Labs:   Urine dipstick today shows negative for all components.    Lab Results   Component Value Date    WBC 10.95 06/09/2020    HGB 15.5 06/09/2020    HCT 47.6 06/09/2020    MCV 92 06/09/2020     06/09/2020     Lab Results   Component Value Date     06/09/2020    K 4.7 06/09/2020     06/09/2020    CO2 25 06/09/2020    BUN 15 06/09/2020    CREATININE 1.1 06/09/2020    CALCIUM 9.2 06/09/2020    ANIONGAP 9 06/09/2020    ESTGFRAFRICA >60.0 06/09/2020    EGFRNONAA >60.0 06/09/2020     Lab Results   Component Value Date    PSA 7.0 (H) 06/09/2020    PSA 3.7 05/02/2019    PSA 2.4 10/27/2017    PSADIAG 8.1 (H) 07/06/2020       Imaging:    CT Abd and Pelvis (8/14/20) - no nodes, anterolistesis of L5 on S1, no bony met noted    NM Bone scan (8/13/20) - Isolated focal abnormal uptake in the left aspect of L5, equivocal for degenerative versus metastatic etiology.      Assessment: Roberto Edgar is a 55 y.o. male with [id number] [place] 4+4=8 prostate adenocarcinoma.      Plan:   1. To OR on 10/19/2020 for RALP with BPLND  2. Consents signed   3. I have explained the risk, benefits, and alternatives of the procedure in detail. The patient voices understanding and all questions have been answered. The patient agrees to proceed as planned.  4. COVID test ordered.      Ry George MD

## 2020-10-14 NOTE — PROGRESS NOTES
Urology ([facility]) H&P for upcoming procedure  Staff:  Marshall Perdue MD    CC: prostate adencarcinoma    HPI:  Roberto Edgar is a 55 y.o. male with [id number] [place] 4+4=8 prostate adenocarcinoma.    Strong family history (father, 2 paternal uncles,cousin, brother)  Uses PEP for erectile dysfunction since 2018. Good control.  Had long talk on 8/24, scheduled for robotic assisted laparoscopic prostatectomy on 10/19. Has more questions.    TRUS biopsy revealed the following:       LEFT   RIGHT  BASE   -   4+4 (15%)  MID   -   3+3 (5%)  APEX   3+3 (5%)  3+3 (5%)    Date of Biopsy: 7/31/2020  PSA: 7.0  Volume: 27 g  Voiding complaints: absent  ED: present - papaverine  Incontinence: absent    CARLOS ALBERTO - 6 high risk  NCCN - high risk  Germline - recommended, consult placed with Jose Luis Welch  Somatic - discussed, ordered      Other medical history includes HTN (controlled) and ED. Denies cardiac or pulmonary history. No stents or MI, CVA. No MANOLO. Does not take anticoagulation. Abdominal surgeries include umbilical hernia repair as a child and right inguinal hernia repair >15 years ago.    ROS:  Neg except per HPI, specifically no bone pain, no unintentional weight loss, no anorexia, no night sweats    Past Medical History:   Diagnosis Date    Erectile dysfunction due to arterial insufficiency 5/24/2018    Hypertension     Prediabetes 5/3/2019    Prostate cancer 8/24/2020    Shoulder pain, bilateral        Past Surgical History:   Procedure Laterality Date    COLONOSCOPY N/A 8/2/2018    Procedure: COLONOSCOPY;  Surgeon: Saeid Velazquez MD;  Location: [facility] ([address] [place] [address];  Service: Endoscopy;  Laterality: N/A;    HERNIA REPAIR         Social History     Socioeconomic History    Marital status:      Spouse name: Not on file    Number of children: 1    Years of education: Not on file    Highest education level: Not on file   Occupational History    Occupation:    Social Needs    Financial  resource strain: Not on file    Food insecurity     Worry: Not on file     Inability: Not on file    Transportation needs     Medical: Not on file     Non-medical: Not on file   Tobacco Use    Smoking status: Never Smoker    Smokeless tobacco: Never Used   Substance and Sexual Activity    Alcohol use: Yes     Comment: once in a while    Drug use: No    Sexual activity: Not Currently   Lifestyle    Physical activity     Days per week: Not on file     Minutes per session: Not on file    Stress: Not on file   Relationships    Social connections     Talks on phone: Not on file     Gets together: Not on file     Attends Rastafari service: Not on file     Active member of club or organization: Not on file     Attends meetings of clubs or organizations: Not on file     Relationship status: Not on file   Other Topics Concern    Not on file   Social History Narrative    Not on file       Family History   Problem Relation Age of Onset    Hypertension Mother     Other Mother         s/p hysterectomy for reason unknown to patient    Diabetes Father     Prostate cancer Father 60        underwent chemo; no known mets    Diabetes Maternal Grandmother     Heart disease Maternal Aunt         cardiomyopathy?    Diabetes Maternal Aunt     Multiple sclerosis Maternal Cousin     Kidney failure Maternal Cousin     Other Maternal Cousin         overweight    Diabetes Paternal Grandmother     Pneumonia Paternal Grandfather     Prostate cancer Paternal Grandfather         unknown whether underwent chemo or had mets, though mets not suspected; not cause of death    Down syndrome Paternal Cousin     Prostate cancer Paternal Cousin     Prostate cancer Paternal Cousin     Hydrocephalus Brother     Breast cancer Other 50    Prostate cancer Other         stage IV; cause of death    Pancreatic cancer Other     Prostate cancer Paternal Uncle 83        unknown whether underwent chemo; possible mets    Prostate  cancer Paternal Uncle 83        unknown whether underwent chemo; unknown whether had mets    Glaucoma Neg Hx     Macular degeneration Neg Hx     Retinal detachment Neg Hx     Strabismus Neg Hx     Cataracts Neg Hx     Amblyopia Neg Hx     Blindness Neg Hx        Review of patient's allergies indicates:  No Known Allergies    Current Outpatient Medications on File Prior to Visit   Medication Sig Dispense Refill    ciprofloxacin HCl (CIPRO) 500 MG tablet Take one tab night before procedure. Take one morning of procedure. Take one tab night after procedure. Take last tab next morning after procedure. (Patient not taking: Reported on 10/2/2020) 4 tablet 0    hydrocortisone 2.5 % cream Apply topically 2 (two) times daily. 20 g 2    losartan (COZAAR) 100 MG tablet Take 1 tablet (100 mg total) by mouth once daily. 30 tablet 11    papaverine 30 mg/mL injection Add: Phentolamine 10 mg Add: PGE1 1mcg Sig:  Inject 5 units as directed (Patient not taking: Reported on 10/5/2020) 10 mL 11     Current Facility-Administered Medications on File Prior to Visit   Medication Dose Route Frequency Provider Last Rate Last Dose    lidocaine HCL 2% jelly   Topical (Top) Once Jazlyn Cardoso NP           Anticoagulation:  No    Physical Exam:  BMI: 28.9    Physical Exam   Constitutional: He is oriented to person, place, and time and well-developed, well-nourished, and in no distress. No distress.   Eyes: No scleral icterus.   Cardiovascular: Normal rate and intact distal pulses.   Pulmonary/Chest: Effort normal. No respiratory distress.   Abdominal: Soft. He exhibits no distension. There is no abdominal tenderness.       Genitourinary:    Penis normal.   No discharge found.    Genitourinary Comments: Circumcised. Meatus in normal position. No meatal stenosis.  The testes, epididymides, and cord structures are normal in size and contour bilaterally.  Penis normal - no plaques, lesions, or ulcers.     Musculoskeletal: Normal  range of motion.         General: No tenderness.   Neurological: He is alert and oriented to person, place, and time.   Skin: Skin is warm and dry. No rash noted.   Psychiatric: Mood, affect and judgment normal.      Labs:   Urine dipstick today shows negative for all components.    Lab Results   Component Value Date    WBC 10.95 06/09/2020    HGB 15.5 06/09/2020    HCT 47.6 06/09/2020    MCV 92 06/09/2020     06/09/2020     Lab Results   Component Value Date     06/09/2020    K 4.7 06/09/2020     06/09/2020    CO2 25 06/09/2020    BUN 15 06/09/2020    CREATININE 1.1 06/09/2020    CALCIUM 9.2 06/09/2020    ANIONGAP 9 06/09/2020    ESTGFRAFRICA >60.0 06/09/2020    EGFRNONAA >60.0 06/09/2020     Lab Results   Component Value Date    PSA 7.0 (H) 06/09/2020    PSA 3.7 05/02/2019    PSA 2.4 10/27/2017    PSADIAG 8.1 (H) 07/06/2020       Imaging:    CT Abd and Pelvis (8/14/20) - no nodes, anterolistesis of L5 on S1, no bony met noted    NM Bone scan (8/13/20) - Isolated focal abnormal uptake in the left aspect of L5, equivocal for degenerative versus metastatic etiology.      Assessment: Roberto Edgar is a 55 y.o. male with [id number] [place] 4+4=8 prostate adenocarcinoma.      Plan:   1. To OR on 10/19/2020 for RALP with BPLND  2. Consents signed   3. I have explained the risk, benefits, and alternatives of the procedure in detail. The patient voices understanding and all questions have been answered. The patient agrees to proceed as planned.  4. COVID test ordered.      Ry George MD

## 2020-10-14 NOTE — DISCHARGE INSTRUCTIONS
Your surgery has been scheduled for:__________________________________________    You should report to:  ____[facility], located on the [place] side of the first floor of the           [facility] ([phone])  ____[facility], located on the [facility] side of the            Second floor of the [facility] ([phone])  ____[facility] ([facility]) [facility] [address]  Please Note   - Tell your doctor if you take Aspirin, products containing Aspirin, herbal medications  or blood thinners, such as Coumadin, Ticlid, or Plavix.  (Consult your provider regarding holding or stopping before surgery).  - Arrange for someone to drive you home following surgery.  You will not be allowed to leave the surgical facility alone or drive yourself home following sedation and anesthesia.  Before Surgery  - Stop taking all herbal medications 14days prior to surgery  - No Motrin/Advil (Ibuprofen) 7 days before surgery  - No Aleve (Naproxen) 7 days before surgery  - Stop Taking Aspirin, products containing Aspirin _____days before surgery  - Stop taking blood thinners_______days before surgery  - No Goody's/BC  Powder 7 days before surgery  - Refrain from drinking alcoholic beverages for 24hours before and after surgery  - Stop or limit smoking _________days before surgery  - You may take Tylenol for pain  Night before Surgery   Stop ALL solid food, gum, candy (including vitamins) 8 hours before arrival time.  (Please note: If your surgeon gives you different eating and drinking instructions, please follow surgeon's directions.)   Stop all CLOUDY liquids: coffee with creamer, formula, tube feeds, cloudy juices, non-human milk and breast milk with additives, 6 hours prior to arrival time.   Stop plain breast milk 4 hours prior to arrival time.   The patient should be ENCOURAGED to drink carbohydrate-rich clear liquids (sports  drinks, clear juices) until 2 hours prior to arrival time.   CLEAR liquids include only water, black coffee NO creamer, clear oral rehydration drinks, clear sports drinks or clear fruit juices (no orange juice, no pulpy juices, no apple cider). Advise patients if they can read newsprint through the liquid, it qualifies as clear liquid.    IF IN DOUBT, drink water instead.   - Take a shower or bath (shower is recommended).  Bathe with Hibiclens soap or an antibacterial soap from the neck down.  If not supplied by your surgeon, hibiclens soap will need to be purchased over the counter in pharmacy.  Rinse soap off thoroughly.  - Shampoo your hair with your regular shampoo  The Day of Surgery  · NOTHING TO  DRINK 2 hours before arrival time. If you are told to take medication on the morning of surgery, it may be taken with a sip of water.   - Take another bath or shower with hibiclens or any antibacterial soap, to reduce the chance of infection.  - Take heart and blood pressure medications with a small sip of water, as advised by the perioperative team.  - Do not take fluid pills  - You may brush your teeth and rinse your mouth, but do not swallow any additional water.   - Do not apply perfumes, powder, body lotions or deodorant on the day of surgery.  - Nail polish should be removed.  - Do not wear makeup or moisturizer  - Wear comfortable clothes, such as a button front shirt and loose fitting pants.  - Leave all jewelry, including body piercings, and valuables at home.    - Bring any devices you will neeed after surgery such as crutches or canes.  - If you have sleep apnea, please bring your CPAP machine  In the event that your physical condition changes including the onset of a cold or respiratory illness, or if you have to delay or cancel your surgery, please notify your surgeon.  Anesthesia: General Anesthesia     You are watched continuously during your procedure by your anesthesia provider.     Youre due to  have surgery. During surgery, youll be given medicine called anesthesia or anesthetic. This will keep you comfortable and pain-free. Your anesthesia provider will use general anesthesia.  What is general anesthesia?  General anesthesia puts you into a state like deep sleep. It goes into the bloodstream (IV anesthetics), into the lungs (gas anesthetics), or both. You feel nothing during the procedure. You will not remember it. During the procedure, the anesthesia provider monitors you continuously. He or she checks your heart rate and rhythm, blood pressure, breathing, and blood oxygen.  · IV anesthetics. IV anesthetics are given through an IV line in your arm. Theyre often given first. This is so you are asleep before a gas anesthetic is started. Some kinds of IV anesthetics relieve pain. Others relax you. Your doctor will decide which kind is best in your case.  · Gas anesthetics. Gas anesthetics are breathed into the lungs. They are often used to keep you asleep. They can be given through a facemask or a tube placed in your larynx or trachea (breathing tube).  ? If you have a facemask, your anesthesia provider will most likely place it over your nose and mouth while youre still awake. Youll breathe oxygen through the mask as your IV anesthetic is started. Gas anesthetic may be added through the mask.  ? If you have a tube in the larynx or trachea, it will be inserted into your throat after youre asleep.  Anesthesia tools and medicines  You will likely have:  · IV anesthetics. These are put into an IV line into your bloodstream.  · Gas anesthetics. You breathe these anesthetics into your lungs, where they pass into your bloodstream.  · Pulse oximeter. This is a small clip that is attached to the end of your finger. This measures your blood oxygen level.  · Electrocardiography leads (electrodes). These are small sticky pads that are placed on your chest. They record your heart rate and rhythm.  · Blood pressure  cuff. This reads your blood pressure.  Risks and possible complications  General anesthesia has some risks. These include:  · Breathing problems  · Nausea and vomiting  · Sore throat or hoarseness (usually temporary)  · Allergic reaction to the anesthetic  · Irregular heartbeat (rare)  · Cardiac arrest (rare)   Anesthesia safety  · Follow all instructions you are given for how long not to eat or drink before your procedure.  · Be sure your doctor knows what medicines and drugs you take. This includes over-the-counter medicines, herbs, supplements, alcohol or other drugs. You will be asked when those were last taken.  · Have an adult family member or friend drive you home after the procedure.  · For the first 24 hours after your surgery:  ? Do not drive or use heavy equipment.  ? Do not make important decisions or sign legal documents. If important decisions or signing legal documents is necessary during the first 24 hours after surgery, have a trusted family member or spouse act on your behalf.  ? Avoid alcohol.  ? Have a responsible adult stay with you. He or she can watch for problems and help keep you safe.  Date Last Reviewed: 12/1/2016 © 2000-2017 [employer]. [address] [place] [address]. All rights reserved. This information is not intended as a substitute for professional medical care. Always follow your healthcare professional's instructions  Anesthesia: Regional Anesthesia    Youre scheduled for surgery. During surgery, youll receive medicine called anesthesia to keep you comfortable and pain-free. Your surgeon has decided that youll receive regional anesthesia. This sheet tells you what to expect with this type of anesthesia.  What is regional anesthesia?  Regional anesthesia numbs one region of your body. The anesthesia may be given around nerves or into veins in your arms, neck, or legs (nerve block or [place] block). Or it may be sent into the spinal fluid (spinal  anesthesia) or into the space just outside the spinal fluid (epidural anesthesia). You may also be given sedatives to help you relax.  Nerve block or Foss block  A small area of the body, such as an arm or leg, can be numbed using a nerve block or Foss block.  · Nerve block. During a nerve block, your skin is numbed. A needle is then inserted near nerves that serve the area to be numbed. Anesthetic is sent through the needle.  · IV regional or Yun block. For this type of block, an IV line is put into a vein. The blood flow to the area to be numbed is blocked for a short time. Anesthetic is sent through the IV.  Spinal anesthesia  Spinal anesthesia numbs your body from about the waist down.  · Anesthetic is injected into the spinal fluid. This is a substance that surrounds the spinal cord in your spinal column. The anesthetic blocks pain traveling from the body to the brain.  · To receive the anesthetic, your skin is numbed at the injection site on your back.  · A needle is then inserted into the spinal space. Anesthetic is sent into the spinal fluid through the needle.  Epidural anesthesia  Epidural anesthesia is most commonly used during childbirth and may also be used after surgical procedures of the chest, belly, and legs.  · Anesthetic is injected into the epidural space. This is just outside the dural sac which contains the spinal fluid.  · To receive the anesthetic, your skin is numbed at the injection site on your back.  · A needle is then inserted into the epidural space. Anesthetic is sent into the epidural space through the needle.  · A small flexible catheter may be attached to the needle and left in place. This allows for continuous injections or infusions of anesthetic.  Anesthesia tools and medicines that might be near you during your procedure  · Local anesthetic. This medicine is given through a needle numbs one region of your body.  · Electrocardiography leads (electrodes). These are used to record  your heart rate and rhythm.  · Blood pressure cuff. A cuff is placed on your arm to keep track of your blood pressure.  · Pulse oximeter. This small clip is placed on the end of the finger. It measures your blood oxygen level.  · Sedatives. These medicines may be given through an IV. They help to relax you and keep you comfortable. You may stay awake or sleep lightly.  · Oxygen. You may be given oxygen through a facemask.  Risks and possible complications  Regional anesthesia carries some risks. These include:  · Nausea and vomiting  · Headache  · Backache  · Decreased blood pressure  · Allergic reaction to the anesthetic  · Ongoing numbness (rare)  · Irregular heartbeat (rare)  · Cardiac arrest (rare)   Date Last Reviewed: 12/1/2016  © 7201-7021 [employer]. [address] [place] [address], [place] [address]. All rights reserved. This information is not intended as a substitute for professional medical care. Always follow your healthcare professional's instructions.

## 2020-10-14 NOTE — PATIENT INSTRUCTIONS

## 2020-10-14 NOTE — PROGRESS NOTES
"  Subjective:       Patient ID:  Roberto Edgar is a 55 y.o. male who presents for   Chief Complaint   Patient presents with    Skin Check     tbse     HPI   Pt presents today for TBSE. 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  A VUS indicates the presence of a variation in the gene's sequencing, without enough data, presently, to classify it as being benign versus pathogenic."    No concerns today. Denies any new, changing, or symptomatic lesions on the skin.  No family hx of melanoma.  No personal hx of skin cancer.    Review of Systems   Constitutional: Negative for fever, chills, weight loss, weight gain, fatigue, night sweats and malaise.   Skin: Negative for daily sunscreen use, activity-related sunscreen use and wears hat.   Hematologic/Lymphatic: Does not bruise/bleed easily.        Objective:    Physical Exam   Constitutional: He appears well-developed and well-nourished. No distress.   Neurological: He is alert and oriented to person, place, and time. He is not disoriented.   Psychiatric: He has a normal mood and affect. He is not agitated.   Skin:   Areas Examined (abnormalities noted in diagram):   Scalp / Hair Palpated and Inspected  Head / Face Inspection Performed  Neck Inspection Performed  Chest / Axilla Inspection Performed  Abdomen Inspection Performed  Genitals / Buttocks / Groin Inspection Performed  Back Inspection Performed  RUE Inspected  LUE Inspection Performed  RLE Inspected  LLE Inspection Performed  Nails and Digits Inspection Performed              Diagram Legend     Erythematous scaling macule/papule c/w actinic keratosis       Vascular papule c/w angioma      Pigmented verrucoid papule/plaque c/w seborrheic keratosis      Yellow umbilicated papule c/w sebaceous " hyperplasia      Irregularly shaped tan macule c/w lentigo     1-2 mm smooth white papules consistent with Milia      Movable subcutaneous cyst with punctum c/w epidermal inclusion cyst      Subcutaneous movable cyst c/w pilar cyst      Firm pink to brown papule c/w dermatofibroma      Pedunculated fleshy papule(s) c/w skin tag(s)      Evenly pigmented macule c/w junctional nevus     Mildly variegated pigmented, slightly irregular-bordered macule c/w mildly atypical nevus      Flesh colored to evenly pigmented papule c/w intradermal nevus       Pink pearly papule/plaque c/w basal cell carcinoma      Erythematous hyperkeratotic cursted plaque c/w SCC      Surgical scar with no sign of skin cancer recurrence      Open and closed comedones      Inflammatory papules and pustules      Verrucoid papule consistent consistent with wart     Erythematous eczematous patches and plaques     Dystrophic onycholytic nail with subungual debris c/w onychomycosis     Umbilicated papule    Erythematous-base heme-crusted tan verrucoid plaque consistent with inflamed seborrheic keratosis     Erythematous Silvery Scaling Plaque c/w Psoriasis     See annotation      Assessment / Plan:        Multiple benign nevi  Benign-appearing on exam today. Counseled pt to monitor mole(s) and return to clinic if any changes noted or symptoms (bleeding, itching, pain, etc) noted. Brochure provided.    Skin cancer screening  Total body skin examination performed today including at least 12 points as noted in physical examination. No lesions suspicious for malignancy noted.  Patient instructed in importance of daily broad spectrum sunscreen use with spf at least 30. Sun avoidance and topical protection/protective clothing discussed.    Follow up in about 1 year (around 10/14/2021) for skin check or sooner for any concerns.

## 2020-10-15 ENCOUNTER — PATIENT MESSAGE (OUTPATIENT)
Dept: SURGERY | Facility: HOSPITAL | Age: 55
End: 2020-10-15

## 2020-10-16 ENCOUNTER — TELEPHONE (OUTPATIENT)
Dept: UROLOGY | Facility: CLINIC | Age: 55
End: 2020-10-16

## 2020-10-16 ENCOUNTER — LAB VISIT (OUTPATIENT)
Dept: URGENT CARE | Facility: CLINIC | Age: 55
End: 2020-10-16
Payer: COMMERCIAL

## 2020-10-16 DIAGNOSIS — C61 PROSTATE CANCER: ICD-10-CM

## 2020-10-16 PROCEDURE — U0003 INFECTIOUS AGENT DETECTION BY NUCLEIC ACID (DNA OR RNA); SEVERE ACUTE RESPIRATORY SYNDROME CORONAVIRUS 2 (SARS-COV-2) (CORONAVIRUS DISEASE [COVID-19]), AMPLIFIED PROBE TECHNIQUE, MAKING USE OF HIGH THROUGHPUT TECHNOLOGIES AS DESCRIBED BY CMS-2020-01-R: HCPCS

## 2020-10-16 NOTE — TELEPHONE ENCOUNTER
Called patient to confirm surgery with Dr. Perdue on 10/19/2020.  Give arrival time of 8:30a and instructions as follows.    Pt should be on clear liquids the whole day prior to surgery.  Pt should do 2 rectal fleet enemas anytime on the day prior to surgery. Pt must take a shower or bath with Hibiclens soap the night before surgery and also the morning of surgery.  The pt must also drink a 12oz bottle of Gatorade or Powerade 2 to 3 hours prior to the arrival time of surgery.  Also, pt was instructed to continue Kegel exercises.  Pt verbalized understanding.

## 2020-10-17 LAB — SARS-COV-2 RNA RESP QL NAA+PROBE: NOT DETECTED

## 2020-10-18 ENCOUNTER — ANESTHESIA EVENT (OUTPATIENT)
Dept: SURGERY | Facility: HOSPITAL | Age: 55
End: 2020-10-18
Payer: COMMERCIAL

## 2020-10-18 NOTE — ANESTHESIA PREPROCEDURE EVALUATION
[facility]  Anesthesia Pre-Operative Evaluation         Patient Name: Roberto Edgar  YOB: 1965  MRN: [id number]    SUBJECTIVE:     Pre-operative evaluation for Procedure(s) (LRB):  XI ROBOTIC PROSTATECTOMY (N/A)     10/18/2020    Roberto Edgar is a 55 y.o. male w/ a significant PMHx of HTN and prostate cancer who was recently evaluated by Dr. Geroge on 10/14/20 for evaluation of prostate cancer.    Patient now presents for the above procedure(s).      LDA: None documented.       Prev airway: None documented.    Drips: None documented.      Patient Active Problem List   Diagnosis    Essential hypertension    Erectile dysfunction due to arterial insufficiency    BPH with urinary obstruction    Prediabetes    BMI 29.0-29.9,adult    Prostate cancer       Review of patient's allergies indicates:  No Known Allergies    Current Inpatient Medications:   lidocaine HCL 2%   Topical (Top) Once       Current Facility-Administered Medications on File Prior to Encounter   Medication Dose Route Frequency Provider Last Rate Last Dose    lidocaine HCL 2% jelly   Topical (Top) Once Jazlyn Cardoso NP         Current Outpatient Medications on File Prior to Encounter   Medication Sig Dispense Refill    hydrocortisone 2.5 % cream Apply topically 2 (two) times daily. 20 g 2    losartan (COZAAR) 100 MG tablet Take 1 tablet (100 mg total) by mouth once daily. 30 tablet 11    papaverine 30 mg/mL injection Add: Phentolamine 10 mg Add: PGE1 1mcg Sig:  Inject 5 units as directed 10 mL 11       Past Surgical History:   Procedure Laterality Date    COLONOSCOPY N/A 8/2/2018    Procedure: COLONOSCOPY;  Surgeon: Saeid Velazquez MD;  Location: [address];  Service: Endoscopy;  Laterality: N/A;    HERNIA REPAIR         Social History     Socioeconomic History    Marital status:      Spouse name: Not on file    Number of children: 1    Years of education: Not on file    Highest  education level: Not on file   Occupational History    Occupation:    Social Needs    Financial resource strain: Not on file    Food insecurity     Worry: Not on file     Inability: Not on file    Transportation needs     Medical: Not on file     Non-medical: Not on file   Tobacco Use    Smoking status: Never Smoker    Smokeless tobacco: Never Used   Substance and Sexual Activity    Alcohol use: Yes     Comment: once in a while    Drug use: No    Sexual activity: Not Currently   Lifestyle    Physical activity     Days per week: Not on file     Minutes per session: Not on file    Stress: Not on file   Relationships    Social connections     Talks on phone: Not on file     Gets together: Not on file     Attends Yarsanism service: Not on file     Active member of club or organization: Not on file     Attends meetings of clubs or organizations: Not on file     Relationship status: Not on file   Other Topics Concern    Not on file   Social History Narrative    Not on file       OBJECTIVE:     Vital Signs Range (Last 24H):         Significant Labs:  Lab Results   Component Value Date    WBC 4.59 10/14/2020    HGB 14.8 10/14/2020    HCT 45.4 10/14/2020     10/14/2020    CHOL 192 06/09/2020    TRIG 206 (H) 06/09/2020    HDL 49 06/09/2020    ALT 43 10/14/2020    AST 29 10/14/2020     10/14/2020    K 4.8 10/14/2020     10/14/2020    CREATININE 1.1 10/14/2020    BUN 10 10/14/2020    CO2 29 10/14/2020    TSH 1.938 06/09/2020    PSA 7.0 (H) 06/09/2020    HGBA1C 6.4 (H) 10/14/2020       Diagnostic Studies: No relevant studies.    EKG:   Results for orders placed or performed during the hospital encounter of 10/14/20   EKG 12-lead    Collection Time: 10/14/20 12:25 PM    Narrative    Test Reason : Z01.818,    Vent. Rate : 068 BPM     Atrial Rate : 068 BPM     P-R Int : 150 ms          QRS Dur : 074 ms      QT Int : 394 ms       P-R-T Axes : 070 000 026 degrees     QTc Int : 418 ms    Normal  sinus rhythm  Normal ECG  No previous ECGs available  Confirmed by Mark Patel MD ([id number]) on 10/14/2020 3:36:06 PM    Referred By: RHONDA LEOPOLD           Confirmed By:Mark Patel MD       ECHOCARDIOGRAM:  TTE:  No results found for this or any previous visit.      ASSESSMENT/PLAN:         Anesthesia Evaluation    I have reviewed the Patient Summary Reports.   I have reviewed the NPO Status.   I have reviewed the Medications.     Review of Systems  Anesthesia Hx:  No problems with previous Anesthesia  History of prior surgery of interest to airway management or planning: Previous anesthesia: MAC  2018 colonoscopy with MAC.  Procedure performed at an [facility]. Denies Family Hx of Anesthesia complications.   Denies Personal Hx of Anesthesia complications.   Social:  Non-Smoker, Social Alcohol Use    Hematology/Oncology:  Hematology Normal       Prostate Current/Recent Cancer. (PSA increased-biopsy positive) Oncology Comments: Prostate cancer only     EENT/Dental:EENT/Dental Normal   Cardiovascular:   Hypertension (no sequelae.), well controlled Denies MI.  Denies CAD.    Denies Dysrhythmias.   Denies Angina. Runs 6 miles 2-3 times a week Functional Capacity 6-7 METS  Hypertension , Recent typical clinic B/P of 130/70    Pulmonary:   Denies COPD.  Denies Asthma.  Denies Shortness of breath.  Denies Recent URI.  Possible Obstructive Sleep Apnea , (STOP/BANG) Symptoms A - Age > 50 and G - Gender (Male > Female)        Renal/:  Renal/ Normal  Denies Chronic Renal Disease.     Hepatic/GI:  Hepatic/GI Normal Denies Liver Disease.    Musculoskeletal:  Musculoskeletal Normal    Neurological:  Neurology Normal Denies TIA.  Denies CVA. Denies Seizures.    Endocrine:   PreDM HgbA1c 6.4 on 10/14/20, not on meds yet, but close   Psych:  Psychiatric Normal           Physical Exam  General:  Well nourished    Airway/Jaw/Neck:  Airway Findings: Mouth Opening: Normal Tongue: Normal  General Airway Assessment: Adult,  Average  Mallampati: II  TM Distance: Normal, at least 6 cm  Jaw/Neck Findings:  Neck ROM: Normal ROM            Mental Status:  Mental Status Findings:  Cooperative, Alert and Oriented         Anesthesia Plan  Type of Anesthesia, risks & benefits discussed:  Anesthesia Type:  general, regional  Patient's Preference:   Intra-op Monitoring Plan: standard ASA monitors  Intra-op Monitoring Plan Comments:   Post Op Pain Control Plan: multimodal analgesia, IV/PO Opioids PRN and per primary service following discharge from PACU  Post Op Pain Control Plan Comments:   Induction:   IV  Beta Blocker:  Patient is not currently on a Beta-Blocker (No further documentation required).       Informed Consent: Patient understands risks and agrees with Anesthesia plan.  Questions answered. Anesthesia consent signed with patient.  ASA Score: 2     Day of Surgery Review of History & Physical: I have interviewed and examined the patient. I have reviewed the patient's H&P dated:    H&P update referred to the provider.         Ready For Surgery From Anesthesia Perspective.

## 2020-10-19 ENCOUNTER — ANESTHESIA (OUTPATIENT)
Dept: SURGERY | Facility: HOSPITAL | Age: 55
End: 2020-10-19
Payer: COMMERCIAL

## 2020-10-19 ENCOUNTER — HOSPITAL ENCOUNTER (OUTPATIENT)
Facility: HOSPITAL | Age: 55
LOS: 1 days | Discharge: HOME | End: 2020-10-20
Attending: UROLOGY | Admitting: UROLOGY
Payer: COMMERCIAL

## 2020-10-19 DIAGNOSIS — Z01.818 PREOPERATIVE TESTING: Primary | ICD-10-CM

## 2020-10-19 DIAGNOSIS — C61 PROSTATE CANCER: ICD-10-CM

## 2020-10-19 DIAGNOSIS — R73.03 PREDIABETES: ICD-10-CM

## 2020-10-19 PROCEDURE — 27000221 HC OXYGEN, UP TO 24 HOURS

## 2020-10-19 PROCEDURE — 76942 ECHO GUIDE FOR BIOPSY: CPT | Performed by: STUDENT IN AN ORGANIZED HEALTH CARE EDUCATION/TRAINING PROGRAM

## 2020-10-19 PROCEDURE — 37000009 HC ANESTHESIA EA ADD 15 MINS: Performed by: UROLOGY

## 2020-10-19 PROCEDURE — D9220A PRA ANESTHESIA: ICD-10-PCS | Mod: ,,, | Performed by: ANESTHESIOLOGY

## 2020-10-19 PROCEDURE — 88305 TISSUE EXAM BY PATHOLOGIST: ICD-10-PCS | Mod: 26,,, | Performed by: PATHOLOGY

## 2020-10-19 PROCEDURE — 63600175 PHARM REV CODE 636 W HCPCS: Performed by: STUDENT IN AN ORGANIZED HEALTH CARE EDUCATION/TRAINING PROGRAM

## 2020-10-19 PROCEDURE — 64461 ERECTOR SPINAE PLANE SINGLE INJECTION BLOCK: ICD-10-PCS | Mod: 50,59,, | Performed by: ANESTHESIOLOGY

## 2020-10-19 PROCEDURE — 55866 LAPS SURG PRST8ECT RPBIC RAD: CPT | Mod: ,,, | Performed by: UROLOGY

## 2020-10-19 PROCEDURE — 25000003 PHARM REV CODE 250: Performed by: STUDENT IN AN ORGANIZED HEALTH CARE EDUCATION/TRAINING PROGRAM

## 2020-10-19 PROCEDURE — 88305 TISSUE EXAM BY PATHOLOGIST: CPT | Mod: 26,,, | Performed by: PATHOLOGY

## 2020-10-19 PROCEDURE — D9220A PRA ANESTHESIA: Mod: ,,, | Performed by: ANESTHESIOLOGY

## 2020-10-19 PROCEDURE — 25000003 PHARM REV CODE 250: Performed by: ANESTHESIOLOGY

## 2020-10-19 PROCEDURE — 37000008 HC ANESTHESIA 1ST 15 MINUTES: Performed by: UROLOGY

## 2020-10-19 PROCEDURE — 36000712 HC OR TIME LEV V 1ST 15 MIN: Performed by: UROLOGY

## 2020-10-19 PROCEDURE — 27201423 OPTIME MED/SURG SUP & DEVICES STERILE SUPPLY: Performed by: UROLOGY

## 2020-10-19 PROCEDURE — S0020 INJECTION, BUPIVICAINE HYDRO: HCPCS | Performed by: ANESTHESIOLOGY

## 2020-10-19 PROCEDURE — 71000015 HC POSTOP RECOV 1ST HR: Performed by: UROLOGY

## 2020-10-19 PROCEDURE — 94761 N-INVAS EAR/PLS OXIMETRY MLT: CPT

## 2020-10-19 PROCEDURE — 64461 PVB THORACIC SINGLE INJ SITE: CPT | Mod: 59 | Performed by: STUDENT IN AN ORGANIZED HEALTH CARE EDUCATION/TRAINING PROGRAM

## 2020-10-19 PROCEDURE — 88309 PR  SURG PATH,LEVEL VI: ICD-10-PCS | Mod: 26,,, | Performed by: PATHOLOGY

## 2020-10-19 PROCEDURE — 36000713 HC OR TIME LEV V EA ADD 15 MIN: Performed by: UROLOGY

## 2020-10-19 PROCEDURE — 64461 PVB THORACIC SINGLE INJ SITE: CPT | Mod: 50,59,, | Performed by: ANESTHESIOLOGY

## 2020-10-19 PROCEDURE — C1729 CATH, DRAINAGE: HCPCS | Performed by: UROLOGY

## 2020-10-19 PROCEDURE — 88309 TISSUE EXAM BY PATHOLOGIST: CPT | Performed by: PATHOLOGY

## 2020-10-19 PROCEDURE — 88305 TISSUE EXAM BY PATHOLOGIST: CPT | Performed by: PATHOLOGY

## 2020-10-19 PROCEDURE — 38571 PR LAP,PELVIC LYMPHADENECTOMY: ICD-10-PCS | Mod: 51,,, | Performed by: UROLOGY

## 2020-10-19 PROCEDURE — 71000033 HC RECOVERY, INTIAL HOUR: Performed by: UROLOGY

## 2020-10-19 PROCEDURE — 38571 LAPAROSCOPY LYMPHADENECTOMY: CPT | Mod: 51,,, | Performed by: UROLOGY

## 2020-10-19 PROCEDURE — 27200750 HC INSULATED NEEDLE/ STIMUPLEX: Performed by: STUDENT IN AN ORGANIZED HEALTH CARE EDUCATION/TRAINING PROGRAM

## 2020-10-19 PROCEDURE — 88309 TISSUE EXAM BY PATHOLOGIST: CPT | Mod: 26,,, | Performed by: PATHOLOGY

## 2020-10-19 PROCEDURE — 71000016 HC POSTOP RECOV ADDL HR: Performed by: UROLOGY

## 2020-10-19 PROCEDURE — 63600175 PHARM REV CODE 636 W HCPCS: Performed by: UROLOGY

## 2020-10-19 PROCEDURE — 55866 PR LAP,PROSTATECTOMY,RADICAL,W/NERVE SPARE,INCL ROBOTIC: ICD-10-PCS | Mod: ,,, | Performed by: UROLOGY

## 2020-10-19 RX ORDER — SODIUM CHLORIDE 9 MG/ML
INJECTION, SOLUTION INTRAVENOUS CONTINUOUS PRN
Status: DISCONTINUED | OUTPATIENT
Start: 2020-10-19 | End: 2020-10-19

## 2020-10-19 RX ORDER — HALOPERIDOL 5 MG/ML
0.5 INJECTION INTRAMUSCULAR ONCE AS NEEDED
Status: DISCONTINUED | OUTPATIENT
Start: 2020-10-19 | End: 2020-10-19 | Stop reason: HOSPADM

## 2020-10-19 RX ORDER — PANTOPRAZOLE SODIUM 40 MG/1
40 TABLET, DELAYED RELEASE ORAL DAILY
Status: DISCONTINUED | OUTPATIENT
Start: 2020-10-19 | End: 2020-10-20 | Stop reason: HOSPADM

## 2020-10-19 RX ORDER — ACETAMINOPHEN 500 MG
1000 TABLET ORAL
Status: COMPLETED | OUTPATIENT
Start: 2020-10-19 | End: 2020-10-19

## 2020-10-19 RX ORDER — PREGABALIN 150 MG/1
150 CAPSULE ORAL
Status: COMPLETED | OUTPATIENT
Start: 2020-10-19 | End: 2020-10-19

## 2020-10-19 RX ORDER — MIDAZOLAM HYDROCHLORIDE 1 MG/ML
0.5 INJECTION INTRAMUSCULAR; INTRAVENOUS
Status: DISCONTINUED | OUTPATIENT
Start: 2020-10-19 | End: 2020-10-19

## 2020-10-19 RX ORDER — ONDANSETRON 2 MG/ML
8 INJECTION INTRAMUSCULAR; INTRAVENOUS EVERY 6 HOURS PRN
Status: DISCONTINUED | OUTPATIENT
Start: 2020-10-19 | End: 2020-10-20 | Stop reason: HOSPADM

## 2020-10-19 RX ORDER — KETOROLAC TROMETHAMINE 30 MG/ML
15 INJECTION, SOLUTION INTRAMUSCULAR; INTRAVENOUS
Status: COMPLETED | OUTPATIENT
Start: 2020-10-19 | End: 2020-10-19

## 2020-10-19 RX ORDER — HEPARIN SODIUM 5000 [USP'U]/ML
5000 INJECTION, SOLUTION INTRAVENOUS; SUBCUTANEOUS EVERY 8 HOURS
Status: DISCONTINUED | OUTPATIENT
Start: 2020-10-19 | End: 2020-10-19

## 2020-10-19 RX ORDER — FENTANYL CITRATE 50 UG/ML
25 INJECTION, SOLUTION INTRAMUSCULAR; INTRAVENOUS EVERY 5 MIN PRN
Status: DISCONTINUED | OUTPATIENT
Start: 2020-10-19 | End: 2020-10-19 | Stop reason: HOSPADM

## 2020-10-19 RX ORDER — FENTANYL CITRATE 50 UG/ML
25 INJECTION, SOLUTION INTRAMUSCULAR; INTRAVENOUS EVERY 5 MIN PRN
Status: COMPLETED | OUTPATIENT
Start: 2020-10-19 | End: 2020-10-19

## 2020-10-19 RX ORDER — ACETAMINOPHEN 500 MG
1000 TABLET ORAL EVERY 6 HOURS
Status: DISCONTINUED | OUTPATIENT
Start: 2020-10-19 | End: 2020-10-20 | Stop reason: HOSPADM

## 2020-10-19 RX ORDER — VITAMIN B COMPLEX
1 CAPSULE ORAL DAILY
COMMUNITY

## 2020-10-19 RX ORDER — ROCURONIUM BROMIDE 10 MG/ML
INJECTION, SOLUTION INTRAVENOUS
Status: DISCONTINUED | OUTPATIENT
Start: 2020-10-19 | End: 2020-10-19

## 2020-10-19 RX ORDER — ONDANSETRON 2 MG/ML
INJECTION INTRAMUSCULAR; INTRAVENOUS
Status: DISCONTINUED | OUTPATIENT
Start: 2020-10-19 | End: 2020-10-19

## 2020-10-19 RX ORDER — AMOXICILLIN 500 MG
1 CAPSULE ORAL DAILY
COMMUNITY

## 2020-10-19 RX ORDER — SODIUM CHLORIDE 0.9 % (FLUSH) 0.9 %
10 SYRINGE (ML) INJECTION
Status: DISCONTINUED | OUTPATIENT
Start: 2020-10-19 | End: 2020-10-19 | Stop reason: HOSPADM

## 2020-10-19 RX ORDER — MULTIVITAMIN
1 TABLET ORAL DAILY
COMMUNITY

## 2020-10-19 RX ORDER — PREGABALIN 50 MG/1
150 CAPSULE ORAL NIGHTLY
Status: DISCONTINUED | OUTPATIENT
Start: 2020-10-19 | End: 2020-10-20 | Stop reason: HOSPADM

## 2020-10-19 RX ORDER — HEPARIN SODIUM 5000 [USP'U]/ML
5000 INJECTION, SOLUTION INTRAVENOUS; SUBCUTANEOUS EVERY 8 HOURS
Status: DISCONTINUED | OUTPATIENT
Start: 2020-10-19 | End: 2020-10-20 | Stop reason: HOSPADM

## 2020-10-19 RX ORDER — PROPOFOL 10 MG/ML
VIAL (ML) INTRAVENOUS
Status: DISCONTINUED | OUTPATIENT
Start: 2020-10-19 | End: 2020-10-19

## 2020-10-19 RX ORDER — CEFAZOLIN SODIUM 1 G/3ML
2 INJECTION, POWDER, FOR SOLUTION INTRAMUSCULAR; INTRAVENOUS
Status: COMPLETED | OUTPATIENT
Start: 2020-10-19 | End: 2020-10-19

## 2020-10-19 RX ORDER — BUPIVACAINE HYDROCHLORIDE 7.5 MG/ML
INJECTION, SOLUTION EPIDURAL; RETROBULBAR
Status: COMPLETED | OUTPATIENT
Start: 2020-10-19 | End: 2020-10-19

## 2020-10-19 RX ORDER — FENTANYL CITRATE 50 UG/ML
INJECTION, SOLUTION INTRAMUSCULAR; INTRAVENOUS
Status: DISCONTINUED | OUTPATIENT
Start: 2020-10-19 | End: 2020-10-19

## 2020-10-19 RX ORDER — LIDOCAINE HYDROCHLORIDE 10 MG/ML
1 INJECTION, SOLUTION EPIDURAL; INFILTRATION; INTRACAUDAL; PERINEURAL ONCE
Status: COMPLETED | OUTPATIENT
Start: 2020-10-19 | End: 2020-10-19

## 2020-10-19 RX ORDER — DEXAMETHASONE SODIUM PHOSPHATE 4 MG/ML
INJECTION, SOLUTION INTRA-ARTICULAR; INTRALESIONAL; INTRAMUSCULAR; INTRAVENOUS; SOFT TISSUE
Status: DISCONTINUED | OUTPATIENT
Start: 2020-10-19 | End: 2020-10-19

## 2020-10-19 RX ORDER — NEOSTIGMINE METHYLSULFATE 0.5 MG/ML
INJECTION, SOLUTION INTRAVENOUS
Status: DISCONTINUED | OUTPATIENT
Start: 2020-10-19 | End: 2020-10-19

## 2020-10-19 RX ORDER — OXYCODONE HYDROCHLORIDE 10 MG/1
10 TABLET ORAL EVERY 4 HOURS PRN
Status: DISCONTINUED | OUTPATIENT
Start: 2020-10-19 | End: 2020-10-20 | Stop reason: HOSPADM

## 2020-10-19 RX ORDER — LIDOCAINE HYDROCHLORIDE 20 MG/ML
INJECTION INTRAVENOUS
Status: DISCONTINUED | OUTPATIENT
Start: 2020-10-19 | End: 2020-10-19

## 2020-10-19 RX ORDER — OXYCODONE HYDROCHLORIDE 5 MG/1
5 TABLET ORAL EVERY 4 HOURS PRN
Status: DISCONTINUED | OUTPATIENT
Start: 2020-10-19 | End: 2020-10-20 | Stop reason: HOSPADM

## 2020-10-19 RX ORDER — SODIUM CHLORIDE 9 MG/ML
INJECTION, SOLUTION INTRAVENOUS CONTINUOUS
Status: ACTIVE | OUTPATIENT
Start: 2020-10-19 | End: 2020-10-20

## 2020-10-19 RX ADMIN — OXYCODONE HYDROCHLORIDE 10 MG: 10 TABLET ORAL at 04:10

## 2020-10-19 RX ADMIN — ROCURONIUM BROMIDE 20 MG: 10 INJECTION, SOLUTION INTRAVENOUS at 01:10

## 2020-10-19 RX ADMIN — LIDOCAINE HYDROCHLORIDE 100 MG: 20 INJECTION, SOLUTION INTRAVENOUS at 11:10

## 2020-10-19 RX ADMIN — OXYCODONE HYDROCHLORIDE 10 MG: 10 TABLET ORAL at 09:10

## 2020-10-19 RX ADMIN — PROPOFOL 180 MG: 10 INJECTION, EMULSION INTRAVENOUS at 11:10

## 2020-10-19 RX ADMIN — FENTANYL CITRATE 25 MCG: 50 INJECTION INTRAMUSCULAR; INTRAVENOUS at 04:10

## 2020-10-19 RX ADMIN — FENTANYL CITRATE 25 MCG: 50 INJECTION, SOLUTION INTRAMUSCULAR; INTRAVENOUS at 02:10

## 2020-10-19 RX ADMIN — ONDANSETRON 4 MG: 2 INJECTION, SOLUTION INTRAMUSCULAR; INTRAVENOUS at 03:10

## 2020-10-19 RX ADMIN — NEOSTIGMINE METHYLSULFATE 4 MG: 0.5 INJECTION INTRAVENOUS at 03:10

## 2020-10-19 RX ADMIN — PREGABALIN 150 MG: 50 CAPSULE ORAL at 09:10

## 2020-10-19 RX ADMIN — CEFAZOLIN 2 G: 330 INJECTION, POWDER, FOR SOLUTION INTRAMUSCULAR; INTRAVENOUS at 12:10

## 2020-10-19 RX ADMIN — MIDAZOLAM HYDROCHLORIDE 2 MG: 1 INJECTION, SOLUTION INTRAMUSCULAR; INTRAVENOUS at 10:10

## 2020-10-19 RX ADMIN — LIDOCAINE HYDROCHLORIDE 0.1 MG: 10 INJECTION, SOLUTION EPIDURAL; INFILTRATION; INTRACAUDAL at 09:10

## 2020-10-19 RX ADMIN — PREGABALIN 150 MG: 150 CAPSULE ORAL at 10:10

## 2020-10-19 RX ADMIN — KETOROLAC TROMETHAMINE 15 MG: 30 INJECTION, SOLUTION INTRAMUSCULAR at 10:10

## 2020-10-19 RX ADMIN — SODIUM CHLORIDE, SODIUM GLUCONATE, SODIUM ACETATE, POTASSIUM CHLORIDE, MAGNESIUM CHLORIDE, SODIUM PHOSPHATE, DIBASIC, AND POTASSIUM PHOSPHATE: .53; .5; .37; .037; .03; .012; .00082 INJECTION, SOLUTION INTRAVENOUS at 12:10

## 2020-10-19 RX ADMIN — SODIUM CHLORIDE: 0.9 INJECTION, SOLUTION INTRAVENOUS at 11:10

## 2020-10-19 RX ADMIN — FENTANYL CITRATE 50 MCG: 50 INJECTION, SOLUTION INTRAMUSCULAR; INTRAVENOUS at 11:10

## 2020-10-19 RX ADMIN — BUPIVACAINE HYDROCHLORIDE 15 ML: 7.5 INJECTION, SOLUTION EPIDURAL; RETROBULBAR at 10:10

## 2020-10-19 RX ADMIN — DEXAMETHASONE SODIUM PHOSPHATE 8 MG: 4 INJECTION, SOLUTION INTRAMUSCULAR; INTRAVENOUS at 12:10

## 2020-10-19 RX ADMIN — FENTANYL CITRATE 100 MCG: 50 INJECTION INTRAMUSCULAR; INTRAVENOUS at 10:10

## 2020-10-19 RX ADMIN — ACETAMINOPHEN 1000 MG: 500 TABLET ORAL at 10:10

## 2020-10-19 RX ADMIN — HEPARIN SODIUM 5000 UNITS: 5000 INJECTION INTRAVENOUS; SUBCUTANEOUS at 10:10

## 2020-10-19 RX ADMIN — SODIUM CHLORIDE: 0.9 INJECTION, SOLUTION INTRAVENOUS at 01:10

## 2020-10-19 RX ADMIN — SODIUM CHLORIDE: 0.9 INJECTION, SOLUTION INTRAVENOUS at 03:10

## 2020-10-19 RX ADMIN — ACETAMINOPHEN 1000 MG: 500 TABLET ORAL at 05:10

## 2020-10-19 RX ADMIN — ROCURONIUM BROMIDE 50 MG: 10 INJECTION, SOLUTION INTRAVENOUS at 11:10

## 2020-10-19 RX ADMIN — HEPARIN SODIUM 5000 UNITS: 5000 INJECTION INTRAVENOUS; SUBCUTANEOUS at 09:10

## 2020-10-19 RX ADMIN — PANTOPRAZOLE SODIUM 40 MG: 40 TABLET, DELAYED RELEASE ORAL at 04:10

## 2020-10-19 NOTE — PLAN OF CARE
Discussed pain medication and educated on pain level. He understands the need to ask for pain medication when needed. Pt states he understands.

## 2020-10-19 NOTE — ANESTHESIA PROCEDURE NOTES
Intubation  Performed by: Brandin Belcher MD  Authorized by: Silver Roberts Jr., MD     Intubation:     Induction:  Intravenous    Intubated:  Postinduction    Mask Ventilation:  Easy mask    Attempts:  1    Attempted By:  Resident anesthesiologist    Method of Intubation:  Direct    Blade:  Murray 2    Laryngeal View Grade: Grade IIA - cords partially seen      Difficult Airway Encountered?: No      Complications:  None    Airway Device:  Oral endotracheal tube    Airway Device Size:  7.5    Style/Cuff Inflation:  Cuffed    Inflation Amount (mL):  7    Secured at:  The lips    Placement Verified By:  Capnometry    Complicating Factors:  None    Findings Post-Intubation:  BS equal bilateral

## 2020-10-19 NOTE — NURSING TRANSFER
Nursing Transfer Note      10/19/2020     Transfer To: 522    Transfer via stretcher    Transfer with 2 L NC to O2    Transported by PCT    Medicines sent: NA    Chart send with patient: Yes    Notified: None Listed    Patient reassessed at: 6811 10/19/2020

## 2020-10-19 NOTE — INTERVAL H&P NOTE
The patient has been examined and the H&P has been reviewed:    I concur with the findings and no changes have occurred since H&P was written.    Surgery risks, benefits and alternative options discussed and understood by patient/family.          Active Hospital Problems    Diagnosis  POA    Prostate cancer [C61]  Yes      Resolved Hospital Problems   No resolved problems to display.

## 2020-10-19 NOTE — TRANSFER OF CARE
"Anesthesia Transfer of Care Note    Patient: Roberto Edgar    Procedure(s) Performed: Procedure(s) (LRB):  XI ROBOTIC PROSTATECTOMY (N/A)  ROBOTIC LYMPHADENECTOMY, PELVIS (Bilateral)    Patient location: PACU    Anesthesia Type: general    Transport from OR: Transported from OR on 6-10 L/min O2 by face mask with adequate spontaneous ventilation    Post pain: adequate analgesia    Post assessment: tolerated procedure well and no apparent anesthetic complications    Post vital signs: stable    Level of consciousness: sedated    Nausea/Vomiting: no nausea/vomiting    Complications: none    Transfer of care protocol was followed      Last vitals:   Visit Vitals  /87   Pulse 74   Temp 36.3 °C (97.3 °F) (Temporal)   Resp 16   Ht 5' 5" (1.651 m)   Wt 79.8 kg (176 lb)   SpO2 97%   BMI 29.29 kg/m²     "

## 2020-10-19 NOTE — PLAN OF CARE
Procedural prep complete. Patient resting comfortably in bed. Family at bedside. Call light within reach.

## 2020-10-19 NOTE — ANESTHESIA PROCEDURE NOTES
Erector Spinae Plane Single Injection Block    Patient location during procedure: pre-op   Block not for primary anesthetic.  Reason for block: at surgeon's request and post-op pain management   Post-op Pain Location: abdominal pain  Start time: 10/19/2020 10:15 AM  Timeout: 10/19/2020 10:15 AM   End time: 10/19/2020 10:22 AM    Staffing  Authorizing Provider: Agustin Young MD  Performing Provider: Jace Sin MD    Preanesthetic Checklist  Completed: patient identified, site marked, surgical consent, pre-op evaluation, timeout performed, IV checked, risks and benefits discussed and monitors and equipment checked  Peripheral Block  Patient position: sitting  Prep: ChloraPrep  Patient monitoring: heart rate, cardiac monitor, continuous pulse ox, continuous capnometry and frequent blood pressure checks  Block type: erector spinae plane (Erector Spinae Plane Block)  Laterality: bilateral  Injection technique: single shot  Location: T9-10  Needle  Needle type: Tuohy   Needle gauge: 17 G  Needle length: 3.5 in  Needle localization: anatomical landmarks and ultrasound guidance   -ultrasound image captured on disc.  Assessment  Injection assessment: negative aspiration, negative parasthesia and local visualized surrounding nerve  Paresthesia pain: none  Heart rate change: no  Slow fractionated injection: yes  Additional Notes  Diluted 15 cc of 0.75% Bupivacaine to 30 cc of 0.375% Bupivacaine.  Added 1:300k epi, 50 mcg of clonidine, and 1 mg PF decadron.  Patient tolerated well.  [facility].

## 2020-10-19 NOTE — PROGRESS NOTES
Pt arrive to floor via stretcher escorted by staff. VSS. AAO x 4. VSS. Lap sites x 6 with dermabond clean dry intact. Call light within reach. Bed in lowest position.

## 2020-10-19 NOTE — OR NURSING
Contacted surgical waiting area to update family at 1330, patient's family not present. Liaison stated they will contact family by phone.

## 2020-10-20 VITALS
SYSTOLIC BLOOD PRESSURE: 131 MMHG | WEIGHT: 176 LBS | OXYGEN SATURATION: 94 % | RESPIRATION RATE: 17 BRPM | DIASTOLIC BLOOD PRESSURE: 74 MMHG | HEIGHT: 65 IN | BODY MASS INDEX: 29.32 KG/M2 | TEMPERATURE: 98 F | HEART RATE: 95 BPM

## 2020-10-20 PROCEDURE — 63600175 PHARM REV CODE 636 W HCPCS: Performed by: STUDENT IN AN ORGANIZED HEALTH CARE EDUCATION/TRAINING PROGRAM

## 2020-10-20 PROCEDURE — 25000003 PHARM REV CODE 250: Performed by: STUDENT IN AN ORGANIZED HEALTH CARE EDUCATION/TRAINING PROGRAM

## 2020-10-20 RX ORDER — POLYETHYLENE GLYCOL 3350 17 G/17G
17 POWDER, FOR SOLUTION ORAL DAILY
Qty: 510 G | Refills: 0 | Status: SHIPPED | OUTPATIENT
Start: 2020-10-20 | End: 2021-05-25

## 2020-10-20 RX ORDER — SULFAMETHOXAZOLE AND TRIMETHOPRIM 400; 80 MG/1; MG/1
1 TABLET ORAL DAILY
Qty: 8 TABLET | Refills: 0 | Status: SHIPPED | OUTPATIENT
Start: 2020-10-20 | End: 2021-05-25

## 2020-10-20 RX ORDER — OXYCODONE HYDROCHLORIDE 5 MG/1
5 TABLET ORAL EVERY 4 HOURS PRN
Qty: 10 TABLET | Refills: 0 | Status: SHIPPED | OUTPATIENT
Start: 2020-10-20 | End: 2021-05-25

## 2020-10-20 RX ADMIN — PANTOPRAZOLE SODIUM 40 MG: 40 TABLET, DELAYED RELEASE ORAL at 09:10

## 2020-10-20 RX ADMIN — ACETAMINOPHEN 1000 MG: 500 TABLET ORAL at 06:10

## 2020-10-20 RX ADMIN — ACETAMINOPHEN 1000 MG: 500 TABLET ORAL at 11:10

## 2020-10-20 RX ADMIN — ACETAMINOPHEN 1000 MG: 500 TABLET ORAL at 12:10

## 2020-10-20 RX ADMIN — HEPARIN SODIUM 5000 UNITS: 5000 INJECTION INTRAVENOUS; SUBCUTANEOUS at 06:10

## 2020-10-20 RX ADMIN — OXYCODONE HYDROCHLORIDE 5 MG: 5 TABLET ORAL at 01:10

## 2020-10-20 RX ADMIN — OXYCODONE HYDROCHLORIDE 5 MG: 5 TABLET ORAL at 06:10

## 2020-10-20 RX ADMIN — OXYCODONE HYDROCHLORIDE 5 MG: 5 TABLET ORAL at 11:10

## 2020-10-20 NOTE — PLAN OF CARE
Future Appointments   Date Time Provider Department Center   10/27/2020  2:15 PM Marshall Perdue MD [facility]OLOG [facility]     Patient discharged home to care of self on 10/20/20.   10/20/20 1412   Final Note   Assessment Type Final Discharge Note   Anticipated Discharge Disposition Home   What phone number can be called within the next 1-3 days to see how you are doing after discharge?   ([phone])   Hospital Follow Up  Appt(s) scheduled? Yes   Discharge plans and expectations educations in teach back method with documentation complete? Yes

## 2020-10-20 NOTE — ANESTHESIA POSTPROCEDURE EVALUATION
Anesthesia Post Evaluation    Patient: Roberto Edgar    Procedure(s) Performed: Procedure(s) (LRB):  XI ROBOTIC PROSTATECTOMY (N/A)  ROBOTIC LYMPHADENECTOMY, PELVIS (Bilateral)    Final Anesthesia Type: general    Patient location during evaluation: med/surg floor  Patient participation: Yes- Able to Participate  Level of consciousness: awake and alert and oriented  Post-procedure vital signs: reviewed and stable  Pain management: adequate  Airway patency: patent    PONV status at discharge: No PONV  Anesthetic complications: no      Cardiovascular status: stable  Respiratory status: unassisted, spontaneous ventilation and room air  Hydration status: euvolemic  Follow-up not needed.          Vitals Value Taken Time   /74 10/20/20 1113   Temp 36.7 °C (98 °F) 10/20/20 1113   Pulse 95 10/20/20 1113   Resp 17 10/20/20 1130   SpO2 94 % 10/20/20 1113         Event Time   Out of Recovery 16:15:00         Pain/Jalil Score: Pain Rating Prior to Med Admin: 5 (10/20/2020 11:30 AM)  Pain Rating Post Med Admin: 5 (10/19/2020  5:15 PM)  Jalil Score: 9 (10/19/2020  5:15 PM)

## 2020-10-20 NOTE — NURSING
Pt ambulated down hallway and back to bed with standby assist and tolerated well. Call bell in reach. Stable. Will continue to monitor.

## 2020-10-20 NOTE — DISCHARGE SUMMARY
[facility]  DISCHARGE SUMMARY      Admit Date:  10/19/2020    Discharge Date and Time:  10/20/2020  12:00 PM    Attending Physician:  Marshall Perdue MD     Discharge Provider:  Zhane Viera MD     Reason for Admission:  Prostate cancer     Procedures Performed:  Procedure(s) (LRB):  XI ROBOTIC PROSTATECTOMY (N/A)  ROBOTIC LYMPHADENECTOMY, PELVIS (Bilateral)    Hospital Course:  Please see the preoperative H&P and other available documentation for full details related to history prior to this admission.  Briefly, Roberto Edgar is a 55 y.o. male who was admitted following scheduled elective surgery for Prostate cancer    Following a complete preoperative discussion of the risks and benefits of surgery with signed informed consent, the patient was taken to the operating room on 10/19/2020 and underwent the above stated procedures.  The patient tolerated surgery well and there were no complications.  Please see the operative report for full intraoperative findings and details.  Postoperatively, the patient did well and was transferred from the PACU to the floor in stable condition where they had a stable and uncomplicated hospital course.  Vital signs remained stable and appropriate throughout course. The patient ambulated without issue. Diet was advanced as tolerated and the patient's pain was controlled on oral pain medications without problem.  Currently, the patient is doing well at 1 Day Post-Op and is stable and appropriate for discharge home at this time.      Physical Exam  Constitutional:       Appearance: Normal appearance. He is not diaphoretic.   HENT:      Head: Normocephalic and atraumatic.   Neck:      Musculoskeletal: Normal range of motion.   Cardiovascular:      Rate and Rhythm: Normal rate.   Pulmonary:      Effort: Pulmonary effort is normal. No respiratory distress.   Abdominal:      Tenderness: There is abdominal tenderness (appropriate).      Comments: Incision  sites dressed with dermabond, intact   Genitourinary:     Comments: Vides catheter in place draining clear yellow urine  Skin:     General: Skin is warm.   Neurological:      General: No focal deficit present.      Mental Status: He is alert and oriented to person, place, and time.   Psychiatric:         Mood and Affect: Mood normal.           Consults:  None.    Significant Diagnostic Studies:   No results for input(s): WBC, HGB, HCT, PLT, BAND, METAMYELOCYT, MYELOPCT, HGBA1C in the last 72 hours.No results for input(s): NA, K, CL, CO2, BUN, CREATININE, GLU, CALCIUM, CAION, MG, PHOS, AST, ALT, ALKPHOS, BILITOT, BILIDIR, PROT, ALBUMIN, PREALBUMIN, AMYLASE, LIPASE, CRP, HSCRP, SEDRATE, PROCAL in the last 72 hours.No results for input(s): INR, PTT, LABHEPA, LACTATE, TROPONINI, CPK, CPKMB, MB, BNP in the last 72 hours.No results for input(s): PH, PCO2, PO2, HCO3 in the last 72 hours.      Final Diagnoses:   Principal Problem:  Prostate cancer   Secondary Diagnoses:    Active Hospital Problems    Diagnosis  POA    *Prostate cancer [C61]  Yes    BMI 29.0-29.9,adult [Z68.29]  Not Applicable    BPH with urinary obstruction [N40.1, N13.8]  Yes    Essential hypertension [I10]  Yes      Resolved Hospital Problems   No resolved problems to display.       Discharged Condition:  Good    Disposition:  Home or Self Care    Follow Up/Patient Instructions:     Medications:  Reconciled Home Medications:    Current Discharge Medication List      START taking these medications    Details   oxyCODONE (ROXICODONE) 5 MG immediate release tablet Take 1 tablet (5 mg total) by mouth every 4 (four) hours as needed for Pain.  Qty: 10 tablet, Refills: 0    Comments: Quantity prescribed more than 7 day supply? No      polyethylene glycol (GLYCOLAX) 17 gram/dose powder Take 17 g by mouth once daily.  Qty: 507 g, Refills: 0      sulfamethoxazole-trimethoprim 400-80mg (BACTRIM,SEPTRA) 400-80 mg per tablet Take 1 tablet by mouth once  daily.  Qty: 8 tablet, Refills: 0         CONTINUE these medications which have NOT CHANGED    Details   b complex vitamins capsule Take 1 capsule by mouth once daily.      losartan (COZAAR) 100 MG tablet Take 1 tablet (100 mg total) by mouth once daily.  Qty: 30 tablet, Refills: 11    Comments: .  Associated Diagnoses: Essential hypertension      multivitamin (ONE DAILY MULTIVITAMIN) per tablet Take 1 tablet by mouth once daily.      omega-3 fatty acids/fish oil (FISH OIL-OMEGA-3 FATTY ACIDS) 300-1,000 mg capsule Take 1 capsule by mouth once daily.      hydrocortisone 2.5 % cream Apply topically 2 (two) times daily.  Qty: 20 g, Refills: 2    Associated Diagnoses: External hemorrhoid      papaverine 30 mg/mL injection Add: Phentolamine 10 mg Add: PGE1 1mcg Sig:  Inject 5 units as directed  Qty: 10 mL, Refills: 11    Associated Diagnoses: Erectile dysfunction due to arterial insufficiency           Discharge Procedure Orders   CBC Without Differential   Standing Status: Future Number of Occurrences: 1 Standing Exp. Date: 11/13/21     Comprehensive metabolic panel   Standing Status: Future Number of Occurrences: 1 Standing Exp. Date: 11/13/21     Hemoglobin A1C   Standing Status: Future Number of Occurrences: 1 Standing Exp. Date: 11/13/21     Diet Adult Regular     Notify your health care provider if you experience any of the following:  temperature >100.4     Notify your health care provider if you experience any of the following:  persistent nausea and vomiting or diarrhea     Notify your health care provider if you experience any of the following:  severe uncontrolled pain     Notify your health care provider if you experience any of the following:  redness, tenderness, or signs of infection (pain, swelling, redness, odor or green/yellow discharge around incision site)     Notify your health care provider if you experience any of the following:  difficulty breathing or increased cough     No dressing needed      EKG 12-lead   Standing Status: Future Number of Occurrences: 1 Standing Exp. Date: 09/14/21     Order Specific Question Answer Comments   Diagnosis Hypertension [[id number]]      Type & Screen   Standing Status: Future Number of Occurrences: 1 Standing Exp. Date: 11/13/21     Activity as tolerated     Shower on day dressing removed (No bath)   Order Comments: OK to shower. Do not soak incisions in bathtub or pool. You may have Dermabond (purple skin glue) over your incisions. This will come off on its own.     Weight bearing restrictions (specify):   Order Comments: Please do not lift anything greater than 10 lbs for 6 weeks in order to reduce your risk of getting a hernia.     Follow-up Information     Marshall Perdue MD On 10/27/2020.    Specialty: Urology  Why: catheter removal  Contact information:  [address]  [facility] [id number]  [phone]                   Zhane Viera MD

## 2020-10-20 NOTE — DISCHARGE INSTRUCTIONS
What to expect with your Robotic Assisted Laparoscopic Prostatectomy.  [facility]  Updated 06/10/2011   Surgery  o Your surgery will last between 1.5 - 3 hours.   After surgery  o You may or may not have a drain that is shaped like a grenade and put to suction  - This drain usually comes out on Post Op Day (POD) 1. It will remain if the output is high and the nurses will teach you how to record the output and you will come back a few days after you leave to have the drain removed  o You will have a catheter after your surgery.  - This catheter protects your tissues to allow for healing between the bladder neck and the urethra now that the prostate has been removed.  - NO ONE IS TO REMOVE THIS CATHETER OR CHANGE THE CATHETER OTHER THAN SOMEONE FROM [facility].  - If you have to go to the ER because your catheter is not draining, come to the [facility] if possible and they will call us if they are not able to irrigate your catheter.  - If you live out of town and have to go to a local ER, then DO NOT let that doctor remove your catheter. If they are unable to irrigate the catheter or are having troubles with it, have them page the [facility] resident on call immediately at [phone] to help answer any questions.  - The catheter should come out in 7-10 days.   - You will have a midline incision after surgery where the prostate was removed. This will be sewn with absorbable suture so you do not need to worry about having the sutures removed.  - You will have smaller incisions where the instruments were inserted that are also sewn closed with absorbable sutures.  o The night of surgery we expect and hope that you will:  - Walk - walking helps get the bowels moving. Also after your surgery, you are at a risk for a deep venous thrombosis (which is a clot in the legs that can form by remaining inactive or still for extended periods of time) and this can travel to your lungs and make you  feel short of breath. This is a very serious condition. Walking helps prevent a DVT from occurring.  - Eat - you do not have to eat a whole meal, but we want to make sure you can tolerate liquid and/or solid food without nausea and vomiting  - Use your incentive spirometer - this is the breathing apparatus that helps you expand your lungs. If and when you have pain you will not want to take deep breaths. But if you dont take deep breaths, you are at risk for pneumonia. The incentive spirometer will help prevent this from occurring by expanding your lungs.  o Symptoms you may experience immediately post-op:  - Bloating and/or shoulder pain - when we do this operation, we fill up your abdominal cavity with gas to better help us visualize the organs and allow our instruments to fit. After the surgery, not all the air can be removed and your body will eventually absorb this small amount of air. However this can make you feel bloated. In addition, when you sit up, the air can sit right under a muscle (the diaphragm) which has connecting nerves to the shoulders, which could explain why you have shoulder pain.  - Do not expect necessarily to have gas or to have a bowel movement - this goes along with the bloating, you may feel like you want to pass gas or have a bowel movement but you cant. This is normal and you will feel like this for a couple days. There are no pills to help with this. Small walks throughout the day should help with this.  - Pain - your pain should be able to be controlled with medicines by mouth that we prescribe. It is important for you to tell us if you are on any pain medications at home before the surgery as you may need stronger pain meds while in the hospital.  - Bladder spasms - this feels like you have to urinate but cant. Sometimes it can be due to clots clogging up your catheter. The nurse will irrigate the catheter, if there are no clots we can prescribe you an anti-spasmodic. We can also  send you home with a prescription for one.   If you go home on anti-spasmodics, do not take one the morning of your appointment to have your catheter removed or you may not be able to void.   You can go home when:  o Pain is controlled with medicines by mouth  o You are able to walk without difficulty or pain  o You are tolerating a regulating diet  o Your catheter is draining freely   When you go home:  o Catheter care  - Blood in your urine (hematuria) is normal. However if you are having clots or your catheter stops draining and you are experiencing abdominal pain, go to the ER.  - If the tip of your penis hurts with the catheter in place, you can put some Vaseline at the end of the catheter to help lubricate the catheter.  o Activity  - Continue to walk - small walks throughout the day are better than one long walk.   - Do not lift anything greater than 8 pounds for 6 weeks - we want your abdominal wall muscles to heal.  o Bowel Movements - Do not strain to have a bowel movement - the pain medicines will make you constipated. That is why we also ask you to take colace 2-3 x per day to help keep your bowels regular. If you are still having trouble, then you can also add Miralax once a day. Do not take any stool softeners if you are having diarrhea.  o Drain - If you have a drain (not your catheter, but a separate drain) then record the output and bring it with you to your next appointment  o Smoking - If you smoke, we encourage you to STOP. Smoking interferes with the healing process and will prolong your healing with continued smoking.  o Driving - Do not drive while you are on pain meds or with your catheter in place.  o Bathing - If you do not have a drain, you can shower 48 hours after your surgery. If you do have a drain, sponge bathe only until the drain is out.  o Dressing - you can remove the dressings if there is no drainage or change them as needed if there is. The little sterile band-aid strips will  fall off on their own in 10-14 days. If they have not fallen off then you can remove them yourself after 14 days.  o Kegels - do not start your Kegels until after your catheter is out.  o Restarting medicines -especially blood thinners (Coumadin, ASA,Plavix), Fish Oil. Discuss this with your physician prior to discharge   When to return to the ER  o Fever - If you have a fever >101.5, this could be due to a number of reasons such as infection of the urine or incision. If your catheter has been removed, you could possibly have a leak. It would be best to come to the ER so they can better evaluate you.  o Severe pain - pain is expected, but severe or new onset of pain is not normal.   o Inability to tolerate food or liquid with nausea and vomiting - it would be best to go to the ER for them to better evaluate you.

## 2020-10-23 ENCOUNTER — PATIENT MESSAGE (OUTPATIENT)
Dept: ADMINISTRATIVE | Facility: OTHER | Age: 55
End: 2020-10-23

## 2020-10-24 ENCOUNTER — HOSPITAL ENCOUNTER (EMERGENCY)
Facility: HOSPITAL | Age: 55
Discharge: HOME | End: 2020-10-24
Attending: EMERGENCY MEDICINE
Payer: COMMERCIAL

## 2020-10-24 VITALS
RESPIRATION RATE: 18 BRPM | TEMPERATURE: 99 F | WEIGHT: 170 LBS | OXYGEN SATURATION: 99 % | SYSTOLIC BLOOD PRESSURE: 128 MMHG | HEART RATE: 104 BPM | DIASTOLIC BLOOD PRESSURE: 78 MMHG | BODY MASS INDEX: 28.32 KG/M2 | HEIGHT: 65 IN

## 2020-10-24 DIAGNOSIS — Z98.890 POST-OPERATIVE STATE: ICD-10-CM

## 2020-10-24 DIAGNOSIS — N48.89 PENILE SWELLING: Primary | ICD-10-CM

## 2020-10-24 PROCEDURE — 99282 EMERGENCY DEPT VISIT SF MDM: CPT

## 2020-10-24 PROCEDURE — 99282 PR EMERGENCY DEPT VISIT,LEVEL II: ICD-10-PCS | Mod: ,,, | Performed by: PHYSICIAN ASSISTANT

## 2020-10-24 PROCEDURE — 99282 EMERGENCY DEPT VISIT SF MDM: CPT | Mod: ,,, | Performed by: PHYSICIAN ASSISTANT

## 2020-10-25 NOTE — DISCHARGE INSTRUCTIONS
Please follow-up with urology on Tuesday.  Return to the ER for symptoms worsen.  Finish her full course of antibiotics.  You may use an ice pack and elevate your scrotum.  Watch for bleeding or discharge at the site.  Return to the emergency room if you have trouble urinating

## 2020-10-25 NOTE — ED PROVIDER NOTES
Encounter Date: 10/24/2020       History     Chief Complaint   Patient presents with    Urinary cath problem     Pt reports prostate surgery on Monday. States he is having some 'puffiness' around the insertion area of the cath. Denies irritation, fever, or discharge.     Patient is a 55-year-old male with history of hypertension, ED, prostate cancer status post robotic prostatectomy 5 days ago.  Patient states that since yesterday he has noticed some swelling to the base of the penis and scrotum.  He denies any pain.  Patient reports that he has been weaning himself off of the pain medication and is only on Tylenol currently.  He has not noticed any purulent discharge from the penis.  He does have a Vides catheter in place that has been draining well.  He has not noticed any bleeding, clots or decreased urination.  He denies any abdominal pain, nausea vomiting or diarrhea.  He reports the that the incisions are healing well and has not noticed any erythema or drainage at the site.  Patient reports he is currently taking his antibiotics.  He reports having an appointment with Urology on Tuesday.    The history is provided by the patient.     Review of patient's allergies indicates:  No Known Allergies  Past Medical History:   Diagnosis Date    Erectile dysfunction due to arterial insufficiency 5/24/2018    Hypertension     Prediabetes 5/3/2019    Prostate cancer 8/24/2020    Shoulder pain, bilateral      Past Surgical History:   Procedure Laterality Date    COLONOSCOPY N/A 8/2/2018    Procedure: COLONOSCOPY;  Surgeon: Saeid Velazquez MD;  Location: [facility] (4TH FLR);  Service: Endoscopy;  Laterality: N/A;    HERNIA REPAIR      ROBOT-ASSISTED LAPAROSCOPIC PELVIC LYMPHADENECTOMY Bilateral 10/19/2020    Procedure: ROBOTIC LYMPHADENECTOMY, PELVIS;  Surgeon: Marshall Perdue MD;  [address]  Service: Urology;  Laterality: Bilateral;    ROBOT-ASSISTED LAPAROSCOPIC PROSTATECTOMY USING DA  SUSHMA BARBER N/A 10/19/2020    Procedure: XI ROBOTIC PROSTATECTOMY;  Surgeon: Marshall Perdue MD;  Location: [facility] OR [address] [place] [address];  Service: Urology;  Laterality: N/A;  3hrs/ gen with regional    TESTICLE SURGERY Right 02/2006    UMBILICAL HERNIA REPAIR N/A unknown     Family History   Problem Relation Age of Onset    Hypertension Mother     Other Mother         s/p hysterectomy for reason unknown to patient    Diabetes Father     Prostate cancer Father 60        underwent chemo; no known mets    Diabetes Maternal Grandmother     Heart disease Maternal Aunt         cardiomyopathy?    Diabetes Maternal Aunt     Multiple sclerosis Maternal Cousin     Kidney failure Maternal Cousin     Other Maternal Cousin         overweight    Diabetes Paternal Grandmother     Pneumonia Paternal Grandfather     Prostate cancer Paternal Grandfather         unknown whether underwent chemo or had mets, though mets not suspected; not cause of death    Down syndrome Paternal Cousin     Prostate cancer Paternal Cousin     Prostate cancer Paternal Cousin     Hydrocephalus Brother     Breast cancer Other 50    Prostate cancer Other         stage IV; cause of death    Pancreatic cancer Other     Prostate cancer Paternal Uncle 83        unknown whether underwent chemo; possible mets    Prostate cancer Paternal Uncle 83        unknown whether underwent chemo; unknown whether had mets    Glaucoma Neg Hx     Macular degeneration Neg Hx     Retinal detachment Neg Hx     Strabismus Neg Hx     Cataracts Neg Hx     Amblyopia Neg Hx     Blindness Neg Hx     Melanoma Neg Hx      Social History     Tobacco Use    Smoking status: Never Smoker    Smokeless tobacco: Never Used   Substance Use Topics    Alcohol use: Yes     Comment: once in a while    Drug use: No     Review of Systems   Constitutional: Negative for chills and fever.   HENT: Negative for congestion.    Respiratory: Negative for cough and shortness of breath.     Cardiovascular: Negative for chest pain.   Gastrointestinal: Negative for abdominal pain, diarrhea, nausea and vomiting.   Genitourinary: Positive for penile swelling and scrotal swelling. Negative for flank pain.   Musculoskeletal: Negative for myalgias.   Skin: Negative for rash.   Allergic/Immunologic: Negative for immunocompromised state.   Neurological: Negative for weakness.   Hematological: Does not bruise/bleed easily.   Psychiatric/Behavioral: Negative for confusion.       Physical Exam     Initial Vitals [10/24/20 2010]   BP Pulse Resp Temp SpO2   119/79 110 18 98.7 °F (37.1 °C) 96 %      MAP       --         Physical Exam    Vitals reviewed.  Constitutional: He appears well-developed and well-nourished. He is not diaphoretic. No distress.   HENT:   Head: Normocephalic and atraumatic.   Eyes: Conjunctivae and EOM are normal.   Neck: Neck supple.   Cardiovascular: Normal rate, regular rhythm, normal heart sounds and intact distal pulses.   Pulmonary/Chest: Breath sounds normal.   Abdominal: Soft. There is no abdominal tenderness.   Surgical scars noted to abdomen with bruising.  No bleeding or purulent discharge.  No signs of infection.   Genitourinary:    Penis normal.   Right testis shows swelling. Right testis shows no mass and no tenderness. Left testis shows swelling. Left testis shows no mass and no tenderness. Circumcised. No penile erythema or penile tenderness. No discharge found.    Genitourinary Comments: Mild swelling noted at the base of penis.  Vides catheter noted, draining well.  No blood noted in urine.     Skin: Skin is warm.         ED Course   Procedures  Labs Reviewed - No data to display       Imaging Results    None                APC / Resident Notes:   Patient was seen in the ER promptly upon arrival.  He is afebrile, no acute distress.  Physical examination reveals minimal swelling at the base of the penis and scrotum.  No tenderness on palpation to the penis or the scrotum.   There is no erythema or signs of infection.  No clots noted at the meatus.  Vides catheter is draining well with light yellow urine.  No blood noted.  Surgical incisions on the abdomen are healing well.  No signs of infection.  There is bruising noted to the abdomen status post surgery.    Patient was reassured.  Minimal swelling noted which is common status post prostatectomy.  Patient advised to watch for signs of infection including redness, drainage, fever or chills.  He was also advised to watch for bleeding or clotting of the fully catheter.  Patient advised to follow up with Urology in the clinic as needed.  He was given strict return precautions ED which was agreeable to.  Patient is stable for discharge and close follow-up at this time.                        Clinical Impression:     ICD-10-CM ICD-9-CM   1. Penile swelling  N48.89 607.83   2. Post-operative state  Z98.890 V45.89                      Disposition:   Disposition: Discharged  Condition: Stable     ED Disposition Condition    Discharge Stable        ED Prescriptions     None        Follow-up Information    None                                      Goldie Reynoso PA-C  10/24/20 [id number]

## 2020-10-27 ENCOUNTER — OFFICE VISIT (OUTPATIENT)
Dept: UROLOGY | Facility: CLINIC | Age: 55
End: 2020-10-27
Payer: COMMERCIAL

## 2020-10-27 VITALS
HEART RATE: 103 BPM | BODY MASS INDEX: 28.57 KG/M2 | DIASTOLIC BLOOD PRESSURE: 86 MMHG | WEIGHT: 171.5 LBS | HEIGHT: 65 IN | SYSTOLIC BLOOD PRESSURE: 142 MMHG

## 2020-10-27 DIAGNOSIS — C61 PROSTATE CANCER: Primary | ICD-10-CM

## 2020-10-27 PROCEDURE — 99999 PR PBB SHADOW E&M-EST. PATIENT-LVL III: CPT | Mod: PBBFAC,,, | Performed by: UROLOGY

## 2020-10-27 PROCEDURE — 99499 NO LOS: ICD-10-PCS | Mod: S$GLB,,, | Performed by: UROLOGY

## 2020-10-27 PROCEDURE — 99999 PR PBB SHADOW E&M-EST. PATIENT-LVL III: ICD-10-PCS | Mod: PBBFAC,,, | Performed by: UROLOGY

## 2020-10-27 PROCEDURE — 99499 UNLISTED E&M SERVICE: CPT | Mod: S$GLB,,, | Performed by: UROLOGY

## 2020-10-27 NOTE — PROGRESS NOTES
Clinic Note  10/27/2020      Subjective:         Chief Complaint:   HPI  Roberto Edgar is a 55 y.o. male with prostate cancer.RALP 10/19/2020. Path pending.  Used 4 opioids post op.     with [facility], covers WB. Strong family history (father, 2 paternal uncles,cousin, brother)  Uses PEP for erectile dysfunction since 2018. Good control.       PSA - 7  Stage - T1C  Volume - 27 ccs (T)  MRI - n/a  Bone scan- equivocal lesion left lateral L5  CT scan- no nodes, anterolistesis of L5 on S1, no bony met noted  Biopsy - 7/30/2020  [place] 8 right base 2/2 15%; [place] 6 left apex 1/2 5%, right apex 2/2 5%, right middle 1/2  5%. Overall 6/12 cores positive  CARLOS ALBERTO Score - 6 high risk  NCCN - high risk  Germline- recommended, consult placed with Jose Luis Welch  Somatic-discussed, ordered         Lab Results   Component Value Date    PSA 7.0 (H) 06/09/2020    PSA 3.7 05/02/2019    PSA 2.4 10/27/2017    PSADIAG 8.1 (H) 07/06/2020      Past Medical History:   Diagnosis Date    Erectile dysfunction due to arterial insufficiency 5/24/2018    Hypertension     Prediabetes 5/3/2019    Prostate cancer 8/24/2020    Shoulder pain, bilateral      Family History   Problem Relation Age of Onset    Hypertension Mother     Other Mother         s/p hysterectomy for reason unknown to patient    Diabetes Father     Prostate cancer Father 60        underwent chemo; no known mets    Diabetes Maternal Grandmother     Heart disease Maternal Aunt         cardiomyopathy?    Diabetes Maternal Aunt     Multiple sclerosis Maternal Cousin     Kidney failure Maternal Cousin     Other Maternal Cousin         overweight    Diabetes Paternal Grandmother     Pneumonia Paternal Grandfather     Prostate cancer Paternal Grandfather         unknown whether underwent chemo or had mets, though mets not suspected; not cause of death    Down syndrome Paternal Cousin     Prostate cancer Paternal Cousin     Prostate cancer Paternal  Cousin     Hydrocephalus Brother     Breast cancer Other 50    Prostate cancer Other         stage IV; cause of death    Pancreatic cancer Other     Prostate cancer Paternal Uncle 83        unknown whether underwent chemo; possible mets    Prostate cancer Paternal Uncle 83        unknown whether underwent chemo; unknown whether had mets    Glaucoma Neg Hx     Macular degeneration Neg Hx     Retinal detachment Neg Hx     Strabismus Neg Hx     Cataracts Neg Hx     Amblyopia Neg Hx     Blindness Neg Hx     Melanoma Neg Hx      Social History     Socioeconomic History    Marital status:      Spouse name: Not on file    Number of children: 1    Years of education: Not on file    Highest education level: Not on file   Occupational History    Occupation:    Social Needs    Financial resource strain: Not on file    Food insecurity     Worry: Not on file     Inability: Not on file    Transportation needs     Medical: Not on file     Non-medical: Not on file   Tobacco Use    Smoking status: Never Smoker    Smokeless tobacco: Never Used   Substance and Sexual Activity    Alcohol use: Yes     Comment: once in a while    Drug use: No    Sexual activity: Not Currently   Lifestyle    Physical activity     Days per week: Not on file     Minutes per session: Not on file    Stress: Not on file   Relationships    Social connections     Talks on phone: Not on file     Gets together: Not on file     Attends [employer] service: Not on file     Active member of club or organization: Not on file     Attends meetings of clubs or organizations: Not on file     Relationship status: Not on file   Other Topics Concern    Not on file   Social History Narrative    Not on file     Past Surgical History:   Procedure Laterality Date    COLONOSCOPY N/A 8/2/2018    Procedure: COLONOSCOPY;  Surgeon: Saeid Velazquez MD;  Location: [address]);  Service: Endoscopy;  Laterality: N/A;    HERNIA  "REPAIR      ROBOT-ASSISTED LAPAROSCOPIC PELVIC LYMPHADENECTOMY Bilateral 10/19/2020    Procedure: ROBOTIC LYMPHADENECTOMY, PELVIS;  Surgeon: Marshall Perdue MD;  Location: [facility] OR [address] [place] [address];  Service: Urology;  Laterality: Bilateral;    ROBOT-ASSISTED LAPAROSCOPIC PROSTATECTOMY USING DA SUSHMA XI N/A 10/19/2020    Procedure: XI ROBOTIC PROSTATECTOMY;  Surgeon: Marshall Perdue MD;  Location: [facility] OR [address] [place] [address];  Service: Urology;  Laterality: N/A;  3hrs/ gen with regional    TESTICLE SURGERY Right 02/2006    UMBILICAL HERNIA REPAIR N/A unknown     Patient Active Problem List   Diagnosis    Essential hypertension    Erectile dysfunction due to arterial insufficiency    BPH with urinary obstruction    Prediabetes    BMI 29.0-29.9,adult    Prostate cancer     Review of Systems      Objective:      There were no vitals taken for this visit.  Estimated body mass index is 28.29 kg/m² as calculated from the following:    Height as of 10/24/20: 5' 5" (1.651 m).    Weight as of 10/24/20: 77.1 kg (170 lb).  Physical Exam     Incisions healing well.      Assessment and Plan:           Problem List Items Addressed This Visit     None          Follow up:   D/C vazquez  1 month with PSA  Will call with path report.    Marshall Perdue          "

## 2020-10-28 LAB
FINAL PATHOLOGIC DIAGNOSIS: NORMAL
Lab: NORMAL

## 2020-10-29 ENCOUNTER — PATIENT MESSAGE (OUTPATIENT)
Dept: UROLOGY | Facility: CLINIC | Age: 55
End: 2020-10-29

## 2020-11-02 ENCOUNTER — PATIENT MESSAGE (OUTPATIENT)
Dept: SURGERY | Facility: HOSPITAL | Age: 55
End: 2020-11-02

## 2020-11-16 ENCOUNTER — PATIENT MESSAGE (OUTPATIENT)
Dept: INTERNAL MEDICINE | Facility: CLINIC | Age: 55
End: 2020-11-16

## 2020-11-16 DIAGNOSIS — Z11.1 PPD SCREENING TEST: Primary | ICD-10-CM

## 2020-11-18 ENCOUNTER — CLINICAL SUPPORT (OUTPATIENT)
Dept: INTERNAL MEDICINE | Facility: CLINIC | Age: 55
End: 2020-11-18
Payer: COMMERCIAL

## 2020-11-18 PROCEDURE — 86580 POCT TB SKIN TEST: ICD-10-PCS | Mod: S$GLB,,, | Performed by: HOSPITALIST

## 2020-11-18 PROCEDURE — 86580 TB INTRADERMAL TEST: CPT | Mod: S$GLB,,, | Performed by: HOSPITALIST

## 2020-11-19 ENCOUNTER — PATIENT MESSAGE (OUTPATIENT)
Dept: UROLOGY | Facility: CLINIC | Age: 55
End: 2020-11-19

## 2020-11-20 ENCOUNTER — CLINICAL SUPPORT (OUTPATIENT)
Dept: INTERNAL MEDICINE | Facility: CLINIC | Age: 55
End: 2020-11-20
Payer: COMMERCIAL

## 2020-11-20 LAB
TB INDURATION - 48 HR READ: NORMAL
TB INDURATION - 72 HR READ: NORMAL
TB SKIN TEST - 48 HR READ: NEGATIVE
TB SKIN TEST - 72 HR READ: NORMAL

## 2020-11-24 ENCOUNTER — OFFICE VISIT (OUTPATIENT)
Dept: UROLOGY | Facility: CLINIC | Age: 55
End: 2020-11-24
Payer: COMMERCIAL

## 2020-11-24 ENCOUNTER — LAB VISIT (OUTPATIENT)
Dept: LAB | Facility: HOSPITAL | Age: 55
End: 2020-11-24
Attending: UROLOGY
Payer: COMMERCIAL

## 2020-11-24 ENCOUNTER — PATIENT OUTREACH (OUTPATIENT)
Dept: ADMINISTRATIVE | Facility: OTHER | Age: 55
End: 2020-11-24

## 2020-11-24 VITALS — WEIGHT: 172 LBS | BODY MASS INDEX: 28.62 KG/M2

## 2020-11-24 DIAGNOSIS — C61 PROSTATE CANCER: ICD-10-CM

## 2020-11-24 DIAGNOSIS — C61 PROSTATE CANCER: Primary | ICD-10-CM

## 2020-11-24 LAB — COMPLEXED PSA SERPL-MCNC: 0.74 NG/ML (ref 0–4)

## 2020-11-24 PROCEDURE — 36415 COLL VENOUS BLD VENIPUNCTURE: CPT

## 2020-11-24 PROCEDURE — 84153 ASSAY OF PSA TOTAL: CPT

## 2020-11-24 PROCEDURE — 99499 UNLISTED E&M SERVICE: CPT | Mod: S$GLB,,, | Performed by: UROLOGY

## 2020-11-24 PROCEDURE — 99499 NO LOS: ICD-10-PCS | Mod: S$GLB,,, | Performed by: UROLOGY

## 2020-11-24 PROCEDURE — 99999 PR PBB SHADOW E&M-EST. PATIENT-LVL I: ICD-10-PCS | Mod: PBBFAC,,, | Performed by: UROLOGY

## 2020-11-24 PROCEDURE — 99999 PR PBB SHADOW E&M-EST. PATIENT-LVL I: CPT | Mod: PBBFAC,,, | Performed by: UROLOGY

## 2020-11-24 NOTE — PROGRESS NOTES
Clinic Note  11/24/2020      Subjective:         Chief Complaint:   HPI  Roberto Edgar is a 55 y.o. male with prostate cancer.RALP 10/19/2020. Path [id number]..  Used 4 opioids post op. PSA pending from today.  Continent and pad free for 1 week.      with [facility], covers WB. Strong family history (father, 2 paternal uncles,cousin, brother)  Uses PEP for erectile dysfunction since 2018. Good control.        PSA - 7  Stage - T1C  Volume - 27 ccs (T)  MRI - n/a  Bone scan- equivocal lesion left lateral L5  CT scan- no nodes, anterolistesis of L5 on S1, no bony met noted  Biopsy - 7/30/2020  [place] 8 right base 2/2 15%; [place] 6 left apex 1/2 5%, right apex 2/2 5%, right middle 1/2  5%. Overall 6/12 cores positive  CARLOS ALBERTO Score - 6 high risk  NCCN - high risk  Germline- recommended, consult placed with Jose Luis Welch  Somatic-discussed, ordered      Lab Results   Component Value Date    PSA 7.0 (H) 06/09/2020    PSA 3.7 05/02/2019    PSA 2.4 10/27/2017    PSADIAG 8.1 (H) 07/06/2020      Past Medical History:   Diagnosis Date    Erectile dysfunction due to arterial insufficiency 5/24/2018    Hypertension     Prediabetes 5/3/2019    Prostate cancer 8/24/2020    Shoulder pain, bilateral      Family History   Problem Relation Age of Onset    Hypertension Mother     Other Mother         s/p hysterectomy for reason unknown to patient    Diabetes Father     Prostate cancer Father 60        underwent chemo; no known mets    Diabetes Maternal Grandmother     Heart disease Maternal Aunt         cardiomyopathy?    Diabetes Maternal Aunt     Multiple sclerosis Maternal Cousin     Kidney failure Maternal Cousin     Other Maternal Cousin         overweight    Diabetes Paternal Grandmother     Pneumonia Paternal Grandfather     Prostate cancer Paternal Grandfather         unknown whether underwent chemo or had mets, though mets not suspected; not cause of death    Down syndrome Paternal Cousin      Prostate cancer Paternal Cousin     Prostate cancer Paternal Cousin     Hydrocephalus Brother     Breast cancer Other 50    Prostate cancer Other         stage IV; cause of death    Pancreatic cancer Other     Prostate cancer Paternal Uncle 83        unknown whether underwent chemo; possible mets    Prostate cancer Paternal Uncle 83        unknown whether underwent chemo; unknown whether had mets    Glaucoma Neg Hx     Macular degeneration Neg Hx     Retinal detachment Neg Hx     Strabismus Neg Hx     Cataracts Neg Hx     Amblyopia Neg Hx     Blindness Neg Hx     Melanoma Neg Hx      Social History     Socioeconomic History    Marital status:      Spouse name: Not on file    Number of children: 1    Years of education: Not on file    Highest education level: Not on file   Occupational History    Occupation:    Social Needs    Financial resource strain: Not on file    Food insecurity     Worry: Not on file     Inability: Not on file    Transportation needs     Medical: Not on file     Non-medical: Not on file   Tobacco Use    Smoking status: Never Smoker    Smokeless tobacco: Never Used   Substance and Sexual Activity    Alcohol use: Yes     Comment: once in a while    Drug use: No    Sexual activity: Not Currently   Lifestyle    Physical activity     Days per week: Not on file     Minutes per session: Not on file    Stress: Not on file   Relationships    Social connections     Talks on phone: Not on file     Gets together: Not on file     Attends Y[employer]dan[employer] service: Not on file     Active member of club or organization: Not on file     Attends meetings of clubs or organizations: Not on file     Relationship status: Not on file   Other Topics Concern    Not on file   Social History Narrative    Not on file     Past Surgical History:   Procedure Laterality Date    COLONOSCOPY N/A 8/2/2018    Procedure: COLONOSCOPY;  Surgeon: Saeid Velazquez MD;  Location: [facility]  "(4TH FLR);  Service: Endoscopy;  Laterality: N/A;    HERNIA REPAIR      ROBOT-ASSISTED LAPAROSCOPIC PELVIC LYMPHADENECTOMY Bilateral 10/19/2020    Procedure: ROBOTIC LYMPHADENECTOMY, PELVIS;  Surgeon: Marshall Perdue MD;  Location: [facility] OR 2ND FLR;  Service: Urology;  Laterality: Bilateral;    ROBOT-ASSISTED LAPAROSCOPIC PROSTATECTOMY USING DA SUSHMA XI N/A 10/19/2020    Procedure: XI ROBOTIC PROSTATECTOMY;  Surgeon: Marshall Perdue MD;  Location: [facility] OR 2ND FLR;  Service: Urology;  Laterality: N/A;  3hrs/ gen with regional    TESTICLE SURGERY Right 02/2006    UMBILICAL HERNIA REPAIR N/A unknown     Patient Active Problem List   Diagnosis    Essential hypertension    Erectile dysfunction due to arterial insufficiency    Prediabetes    BMI 29.0-29.9,adult    Prostate cancer     Review of Systems   Constitutional: Negative for appetite change, chills, fatigue, fever and unexpected weight change.   HENT: Negative for nosebleeds.    Respiratory: Negative for shortness of breath and wheezing.    Cardiovascular: Negative for chest pain, palpitations and leg swelling.   Gastrointestinal: Negative for abdominal distention, abdominal pain, constipation, diarrhea, nausea and vomiting.   Musculoskeletal: Negative for arthralgias and back pain.   Skin: Negative for pallor.   Neurological: Negative for dizziness, seizures and syncope.   Hematological: Negative for adenopathy.   Psychiatric/Behavioral: Negative for dysphoric mood.         Objective:      There were no vitals taken for this visit.  Estimated body mass index is 28.54 kg/m² as calculated from the following:    Height as of 10/27/20: 5' 5" (1.651 m).    Weight as of 10/27/20: 77.8 kg (171 lb 8.3 oz).  Physical Exam  Abdominal:      Comments: Incisions healing well, no erythema or other evidence of infection.            Assessment and Plan:           Problem List Items Addressed This Visit     None          Follow up:   6 months with PSA    Marshall OVALLE" [facility]

## 2020-11-25 NOTE — PROGRESS NOTES
LINKS immunization registry not responding  Care Everywhere updated  Health Maintenance updated  Chart reviewed for overdue Proactive [facility] Encounters (ALEXX) health maintenance testing (CRS, Breast Ca, Diabetic Eye Exam)   Orders entered:N/A

## 2020-11-28 ENCOUNTER — PATIENT MESSAGE (OUTPATIENT)
Dept: INTERNAL MEDICINE | Facility: CLINIC | Age: 55
End: 2020-11-28

## 2020-11-30 ENCOUNTER — OFFICE VISIT (OUTPATIENT)
Dept: UROLOGY | Facility: CLINIC | Age: 55
End: 2020-11-30
Payer: COMMERCIAL

## 2020-11-30 ENCOUNTER — TELEPHONE (OUTPATIENT)
Dept: UROLOGY | Facility: CLINIC | Age: 55
End: 2020-11-30

## 2020-11-30 VITALS
DIASTOLIC BLOOD PRESSURE: 96 MMHG | BODY MASS INDEX: 28.69 KG/M2 | SYSTOLIC BLOOD PRESSURE: 142 MMHG | WEIGHT: 172.19 LBS | HEIGHT: 65 IN | HEART RATE: 77 BPM

## 2020-11-30 DIAGNOSIS — N52.31 ERECTILE DYSFUNCTION FOLLOWING RADICAL PROSTATECTOMY: Primary | ICD-10-CM

## 2020-11-30 DIAGNOSIS — I10 ESSENTIAL HYPERTENSION: ICD-10-CM

## 2020-11-30 DIAGNOSIS — C61 PROSTATE CANCER: ICD-10-CM

## 2020-11-30 PROBLEM — N52.01 ERECTILE DYSFUNCTION DUE TO ARTERIAL INSUFFICIENCY: Status: RESOLVED | Noted: 2018-05-24 | Resolved: 2020-11-30

## 2020-11-30 PROCEDURE — 99244 OFF/OP CNSLTJ NEW/EST MOD 40: CPT | Mod: 24,S$GLB,, | Performed by: UROLOGY

## 2020-11-30 PROCEDURE — 99999 PR PBB SHADOW E&M-EST. PATIENT-LVL IV: CPT | Mod: PBBFAC,,, | Performed by: UROLOGY

## 2020-11-30 PROCEDURE — 99244 PR OFFICE CONSULTATION,LEVEL IV: ICD-10-PCS | Mod: 24,S$GLB,, | Performed by: UROLOGY

## 2020-11-30 PROCEDURE — 1126F AMNT PAIN NOTED NONE PRSNT: CPT | Mod: S$GLB,,, | Performed by: UROLOGY

## 2020-11-30 PROCEDURE — 3008F BODY MASS INDEX DOCD: CPT | Mod: CPTII,S$GLB,, | Performed by: UROLOGY

## 2020-11-30 PROCEDURE — 3008F PR BODY MASS INDEX (BMI) DOCUMENTED: ICD-10-PCS | Mod: CPTII,S$GLB,, | Performed by: UROLOGY

## 2020-11-30 PROCEDURE — 99999 PR PBB SHADOW E&M-EST. PATIENT-LVL IV: ICD-10-PCS | Mod: PBBFAC,,, | Performed by: UROLOGY

## 2020-11-30 PROCEDURE — 1126F PR PAIN SEVERITY QUANTIFIED, NO PAIN PRESENT: ICD-10-PCS | Mod: S$GLB,,, | Performed by: UROLOGY

## 2020-11-30 RX ORDER — PAPAVERINE HYDROCHLORIDE 30 MG/ML
INJECTION INTRAMUSCULAR; INTRAVENOUS
Qty: 10 ML | Refills: 11 | Status: SHIPPED | OUTPATIENT
Start: 2020-11-30 | End: 2020-11-30 | Stop reason: SDUPTHER

## 2020-11-30 RX ORDER — PAPAVERINE HYDROCHLORIDE 30 MG/ML
INJECTION INTRAMUSCULAR; INTRAVENOUS
Qty: 10 ML | Refills: 11 | Status: SHIPPED | OUTPATIENT
Start: 2020-11-30 | End: 2022-04-06 | Stop reason: SDUPTHER

## 2020-11-30 NOTE — LETTER
November 30, 2020        Marshall Perdue MD  [address]  [place] [address]             [facility] 4th Fl  [address]  [facility] [address]  Phone: [phone]   Patient: Roberto Edgar   MR Number: [id number]   YOB: 1965   Date of Visit: 11/30/2020       Dear Dr. Perdue:    Thank you for referring Roberto Edgar to me for evaluation. Attached you will find relevant portions of my assessment and plan of care.    If you have questions, please do not hesitate to call me. I look forward to following Roberto Edgar along with you.    Sincerely,      Cm Candelario MD            CC  No Recipients    Enclosure

## 2020-11-30 NOTE — PROGRESS NOTES
Mr. Edgar is a 55 y.o. male presenting for a consultation at the request of Dr. Perdue. Patient presents with ED.    PRESENTING ILLNESS:    Roberto Edgar is a 55 y.o. male with prostate cancer s/p RALP on 10/19/20 by Dr. Perdue.  Since then, he c/o ED.  Prior to surgery, he did have ED.  He tried and failed Viagra and Cialis pre-op.  He was using 5 units of ICI with good results.    He does not have FUNMI.  He is wearing 0 pads/day.  No hematuria.  No dysuria.  Good FOS.    No bone pain or weight loss.    REVIEW OF SYSTEMS:    Roberto Edgar denies headache, blurred vision, fever, nausea, vomiting, chills, abdominal pain, chest pain, sore throat, bleeding per rectum, cough, SOB, recent loss of consciousness, recent mental status changes, seizures, dizziness, or upper or lower extremity weakness.    JUVE  1. 1  2. 0  3. 0  4. 0  5. 0      PATIENT HISTORY:    Past Medical History:   Diagnosis Date    Erectile dysfunction due to arterial insufficiency 5/24/2018    Hypertension     Prediabetes 5/3/2019    Prostate cancer 8/24/2020    Shoulder pain, bilateral        Family History   Problem Relation Age of Onset    Hypertension Mother     Other Mother         s/p hysterectomy for reason unknown to patient    Diabetes Father     Prostate cancer Father 60        underwent chemo; no known mets    Diabetes Maternal Grandmother     Heart disease Maternal Aunt         cardiomyopathy?    Diabetes Maternal Aunt     Multiple sclerosis Maternal Cousin     Kidney failure Maternal Cousin     Other Maternal Cousin         overweight    Diabetes Paternal Grandmother     Pneumonia Paternal Grandfather     Prostate cancer Paternal Grandfather         unknown whether underwent chemo or had mets, though mets not suspected; not cause of death    Down syndrome Paternal Cousin     Prostate cancer Paternal Cousin     Prostate cancer Paternal Cousin     Hydrocephalus Brother     Breast cancer Other 50    Prostate cancer  Other         stage IV; cause of death    Pancreatic cancer Other     Prostate cancer Paternal Uncle 83        unknown whether underwent chemo; possible mets    Prostate cancer Paternal Uncle 83        unknown whether underwent chemo; unknown whether had mets    Glaucoma Neg Hx     Macular degeneration Neg Hx     Retinal detachment Neg Hx     Strabismus Neg Hx     Cataracts Neg Hx     Amblyopia Neg Hx     Blindness Neg Hx     Melanoma Neg Hx        Past Surgical History:   Procedure Laterality Date    COLONOSCOPY N/A 8/2/2018    Procedure: COLONOSCOPY;  Surgeon: Saeid Velazquez MD;  Location: [facility] (4TH FLR);  Service: Endoscopy;  Laterality: N/A;    HERNIA REPAIR      ROBOT-ASSISTED LAPAROSCOPIC PELVIC LYMPHADENECTOMY Bilateral 10/19/2020    Procedure: ROBOTIC LYMPHADENECTOMY, PELVIS;  Surgeon: Marshall Perdue MD;  Location: [facility] OR 2ND FLR;  Service: Urology;  Laterality: Bilateral;    ROBOT-ASSISTED LAPAROSCOPIC PROSTATECTOMY USING DA SUSHMA XI N/A 10/19/2020    Procedure: XI ROBOTIC PROSTATECTOMY;  Surgeon: Marshall Perdue MD;  [facility];  Service: Urology;  Laterality: N/A;  3hrs/ gen with regional    TESTICLE SURGERY Right 02/2006    UMBILICAL HERNIA REPAIR N/A unknown       Social History     Socioeconomic History    Marital status:      Spouse name: Not on file    Number of children: 1    Years of education: Not on file    Highest education level: Not on file   Occupational History    Occupation:    Social Needs    Financial resource strain: Not on file    Food insecurity     Worry: Not on file     Inability: Not on file    Transportation needs     Medical: Not on file     Non-medical: Not on file   Tobacco Use    Smoking status: Never Smoker    Smokeless tobacco: Never Used   Substance and Sexual Activity    Alcohol use: Yes     Comment: once in a while    Drug use: No    Sexual activity: Not Currently   Lifestyle    Physical activity      Days per week: Not on file     Minutes per session: Not on file    Stress: Not on file   Relationships    Social connections     Talks on phone: Not on file     Gets together: Not on file     Attends [employer] service: Not on file     Active member of club or organization: Not on file     Attends meetings of clubs or organizations: Not on file     Relationship status: Not on file   Other Topics Concern    Not on file   Social History Narrative    Not on file       Review of patient's allergies indicates:  No Known Allergies      Current Outpatient Medications:     b complex vitamins capsule, Take 1 capsule by mouth once daily., Disp: , Rfl:     hydrocortisone 2.5 % cream, Apply topically 2 (two) times daily., Disp: 20 g, Rfl: 2    losartan (COZAAR) 100 MG tablet, Take 1 tablet (100 mg total) by mouth once daily., Disp: 30 tablet, Rfl: 11    multivitamin (ONE DAILY MULTIVITAMIN) per tablet, Take 1 tablet by mouth once daily., Disp: , Rfl:     omega-3 fatty acids/fish oil (FISH OIL-OMEGA-3 FATTY ACIDS) 300-1,000 mg capsule, Take 1 capsule by mouth once daily., Disp: , Rfl:     oxyCODONE (ROXICODONE) 5 MG immediate release tablet, Take 1 tablet (5 mg total) by mouth every 4 (four) hours as needed for Pain., Disp: 10 tablet, Rfl: 0    papaverine 30 mg/mL injection, Add: Phentolamine 10 mg Add: PGE1 1mcg Sig:  Inject 5 units as directed, Disp: 10 mL, Rfl: 11    polyethylene glycol (GLYCOLAX) 17 gram/dose powder, Mix 1 capful (17 g) with a liquid and take by mouth once daily., Disp: 510 g, Rfl: 0    sulfamethoxazole-trimethoprim 400-80mg (BACTRIM,SEPTRA) 400-80 mg per tablet, Take 1 tablet by mouth once daily., Disp: 8 tablet, Rfl: 0    Current Facility-Administered Medications:     lidocaine HCL 2% jelly, , Topical (Top), Once, Jazlyn Cardoso NP      PHYSICAL EXAMINATION:    The patient generally appears in good health, is appropriately interactive, and is in no apparent distress.     Eyes: anicteric  sclerae, moist conjunctivae; no lid-lag; PERRLA     HENT: Atraumatic; oropharynx clear with moist mucous membranes and no mucosal ulcerations;normal hard and soft palate. No evidence of lymphadenopathy.    Neck: Trachea midline.  No thyromegaly.    Musculoskeletal: No abnormal gait.    Skin: No lesions.    Mental: Cooperative with normal affect.  Is oriented to time, place, and person.    Neuro: Grossly intact.    Chest: Normal inspiratory effort.   No accessory muscles.  No audible wheezes.  Respirations symmetric on inspiration and expiration.    Heart: Regular rhythm.      Abdomen:  Soft, non-tender. No masses or organomegaly. Bladder is not palpable. No evidence of flank discomfort. No evidence of inguinal hernia.    Genitourinary: The penis is circumcised with no evidence of plaques or induration. The urethral meatus is normal. The testes, epididymides, and cord structures are normal in size and contour bilaterally. The scrotum is normal in size and contour.    Extremities: No clubbing, cyanosis, or edema        LABS:      Lab Results   Component Value Date    PSA 7.0 (H) 06/09/2020    PSA 3.7 05/02/2019    PSA 2.4 10/27/2017    PSADIAG 0.74 11/24/2020    PSADIAG 8.1 (H) 07/06/2020        IMPRESSION:    Encounter Diagnoses   Name Primary?    Erectile dysfunction following radical prostatectomy Yes    Prostate cancer     Essential hypertension    HTN, controlled    PLAN:    1. Discussed penile rehab today.  The risks, benefits, and the evidence was discussed with him today.  We discussed different options.  He would like to try ICI.  Side effects discussed.  A new Rx was given.  2. RTC 6 months to re-evaluate.    Copy to: [place]

## 2020-12-02 DIAGNOSIS — R97.20 ELEVATED PSA: Primary | ICD-10-CM

## 2020-12-16 ENCOUNTER — PATIENT MESSAGE (OUTPATIENT)
Dept: ADMINISTRATIVE | Facility: OTHER | Age: 55
End: 2020-12-16

## 2021-01-05 ENCOUNTER — PATIENT OUTREACH (OUTPATIENT)
Dept: ADMINISTRATIVE | Facility: OTHER | Age: 56
End: 2021-01-05

## 2021-01-06 ENCOUNTER — LAB VISIT (OUTPATIENT)
Dept: LAB | Facility: HOSPITAL | Age: 56
End: 2021-01-06
Attending: UROLOGY
Payer: COMMERCIAL

## 2021-01-06 DIAGNOSIS — R97.20 ELEVATED PSA: ICD-10-CM

## 2021-01-06 LAB — COMPLEXED PSA SERPL-MCNC: 0.95 NG/ML (ref 0–4)

## 2021-01-06 PROCEDURE — 36415 COLL VENOUS BLD VENIPUNCTURE: CPT

## 2021-01-06 PROCEDURE — 84153 ASSAY OF PSA TOTAL: CPT

## 2021-01-07 ENCOUNTER — OFFICE VISIT (OUTPATIENT)
Dept: UROLOGY | Facility: CLINIC | Age: 56
End: 2021-01-07
Payer: COMMERCIAL

## 2021-01-07 VITALS
HEART RATE: 83 BPM | SYSTOLIC BLOOD PRESSURE: 136 MMHG | BODY MASS INDEX: 29.02 KG/M2 | WEIGHT: 174.19 LBS | DIASTOLIC BLOOD PRESSURE: 84 MMHG | HEIGHT: 65 IN

## 2021-01-07 DIAGNOSIS — C61 PROSTATE CANCER: Primary | ICD-10-CM

## 2021-01-07 PROCEDURE — 99024 POSTOP FOLLOW-UP VISIT: CPT | Mod: S$GLB,,, | Performed by: UROLOGY

## 2021-01-07 PROCEDURE — 99024 PR POST-OP FOLLOW-UP VISIT: ICD-10-PCS | Mod: S$GLB,,, | Performed by: UROLOGY

## 2021-01-07 PROCEDURE — 3008F PR BODY MASS INDEX (BMI) DOCUMENTED: ICD-10-PCS | Mod: CPTII,S$GLB,, | Performed by: UROLOGY

## 2021-01-07 PROCEDURE — 99999 PR PBB SHADOW E&M-EST. PATIENT-LVL III: ICD-10-PCS | Mod: PBBFAC,,, | Performed by: UROLOGY

## 2021-01-07 PROCEDURE — 1126F AMNT PAIN NOTED NONE PRSNT: CPT | Mod: S$GLB,,, | Performed by: UROLOGY

## 2021-01-07 PROCEDURE — 3079F PR MOST RECENT DIASTOLIC BLOOD PRESSURE 80-89 MM HG: ICD-10-PCS | Mod: CPTII,S$GLB,, | Performed by: UROLOGY

## 2021-01-07 PROCEDURE — 3079F DIAST BP 80-89 MM HG: CPT | Mod: CPTII,S$GLB,, | Performed by: UROLOGY

## 2021-01-07 PROCEDURE — 1126F PR PAIN SEVERITY QUANTIFIED, NO PAIN PRESENT: ICD-10-PCS | Mod: S$GLB,,, | Performed by: UROLOGY

## 2021-01-07 PROCEDURE — 3008F BODY MASS INDEX DOCD: CPT | Mod: CPTII,S$GLB,, | Performed by: UROLOGY

## 2021-01-07 PROCEDURE — 3075F PR MOST RECENT SYSTOLIC BLOOD PRESS GE 130-139MM HG: ICD-10-PCS | Mod: CPTII,S$GLB,, | Performed by: UROLOGY

## 2021-01-07 PROCEDURE — 3075F SYST BP GE 130 - 139MM HG: CPT | Mod: CPTII,S$GLB,, | Performed by: UROLOGY

## 2021-01-07 PROCEDURE — 99999 PR PBB SHADOW E&M-EST. PATIENT-LVL III: CPT | Mod: PBBFAC,,, | Performed by: UROLOGY

## 2021-01-19 ENCOUNTER — PATIENT MESSAGE (OUTPATIENT)
Dept: UROLOGY | Facility: CLINIC | Age: 56
End: 2021-01-19

## 2021-02-01 ENCOUNTER — HOSPITAL ENCOUNTER (OUTPATIENT)
Dept: RADIOLOGY | Facility: HOSPITAL | Age: 56
Discharge: HOME | End: 2021-02-01
Attending: UROLOGY
Payer: COMMERCIAL

## 2021-02-01 DIAGNOSIS — C61 PROSTATE CANCER: ICD-10-CM

## 2021-02-01 PROCEDURE — 78815 NM PET CT FLUCICLOVINE F18(PROSTATE CANCER RECURRENCE): ICD-10-PCS | Mod: 26,PS,, | Performed by: RADIOLOGY

## 2021-02-01 PROCEDURE — A9588 FLUCICLOVINE F-18: HCPCS

## 2021-02-01 PROCEDURE — 78815 PET IMAGE W/CT SKULL-THIGH: CPT | Mod: 26,PS,, | Performed by: RADIOLOGY

## 2021-02-04 ENCOUNTER — PATIENT MESSAGE (OUTPATIENT)
Dept: UROLOGY | Facility: CLINIC | Age: 56
End: 2021-02-04

## 2021-02-04 DIAGNOSIS — C61 PROSTATE CANCER: Primary | ICD-10-CM

## 2021-02-04 DIAGNOSIS — Z90.79 STATUS POST PROSTATECTOMY: ICD-10-CM

## 2021-02-07 ENCOUNTER — PATIENT MESSAGE (OUTPATIENT)
Dept: ADMINISTRATIVE | Facility: OTHER | Age: 56
End: 2021-02-07

## 2021-02-17 ENCOUNTER — PATIENT MESSAGE (OUTPATIENT)
Dept: UROLOGY | Facility: CLINIC | Age: 56
End: 2021-02-17

## 2021-02-24 ENCOUNTER — OFFICE VISIT (OUTPATIENT)
Dept: RADIATION ONCOLOGY | Facility: CLINIC | Age: 56
End: 2021-02-24
Attending: RADIOLOGY
Payer: COMMERCIAL

## 2021-02-24 VITALS
HEART RATE: 73 BPM | SYSTOLIC BLOOD PRESSURE: 150 MMHG | WEIGHT: 173.13 LBS | RESPIRATION RATE: 16 BRPM | BODY MASS INDEX: 28.84 KG/M2 | DIASTOLIC BLOOD PRESSURE: 101 MMHG | TEMPERATURE: 98 F | HEIGHT: 65 IN

## 2021-02-24 DIAGNOSIS — C61 PROSTATE CANCER: ICD-10-CM

## 2021-02-24 DIAGNOSIS — Z90.79 STATUS POST PROSTATECTOMY: ICD-10-CM

## 2021-02-24 PROCEDURE — 3077F SYST BP >= 140 MM HG: CPT | Mod: CPTII,S$GLB,, | Performed by: RADIOLOGY

## 2021-02-24 PROCEDURE — 3008F PR BODY MASS INDEX (BMI) DOCUMENTED: ICD-10-PCS | Mod: CPTII,S$GLB,, | Performed by: RADIOLOGY

## 2021-02-24 PROCEDURE — 99999 PR PBB SHADOW E&M-EST. PATIENT-LVL V: ICD-10-PCS | Mod: PBBFAC,,, | Performed by: RADIOLOGY

## 2021-02-24 PROCEDURE — 3008F BODY MASS INDEX DOCD: CPT | Mod: CPTII,S$GLB,, | Performed by: RADIOLOGY

## 2021-02-24 PROCEDURE — 1126F PR PAIN SEVERITY QUANTIFIED, NO PAIN PRESENT: ICD-10-PCS | Mod: S$GLB,,, | Performed by: RADIOLOGY

## 2021-02-24 PROCEDURE — 1126F AMNT PAIN NOTED NONE PRSNT: CPT | Mod: S$GLB,,, | Performed by: RADIOLOGY

## 2021-02-24 PROCEDURE — 99999 PR PBB SHADOW E&M-EST. PATIENT-LVL V: CPT | Mod: PBBFAC,,, | Performed by: RADIOLOGY

## 2021-02-24 PROCEDURE — 99204 PR OFFICE/OUTPT VISIT, NEW, LEVL IV, 45-59 MIN: ICD-10-PCS | Mod: S$GLB,,, | Performed by: RADIOLOGY

## 2021-02-24 PROCEDURE — 3080F DIAST BP >= 90 MM HG: CPT | Mod: CPTII,S$GLB,, | Performed by: RADIOLOGY

## 2021-02-24 PROCEDURE — 3080F PR MOST RECENT DIASTOLIC BLOOD PRESSURE >= 90 MM HG: ICD-10-PCS | Mod: CPTII,S$GLB,, | Performed by: RADIOLOGY

## 2021-02-24 PROCEDURE — 3077F PR MOST RECENT SYSTOLIC BLOOD PRESSURE >= 140 MM HG: ICD-10-PCS | Mod: CPTII,S$GLB,, | Performed by: RADIOLOGY

## 2021-02-24 PROCEDURE — 99204 OFFICE O/P NEW MOD 45 MIN: CPT | Mod: S$GLB,,, | Performed by: RADIOLOGY

## 2021-03-01 ENCOUNTER — HOSPITAL ENCOUNTER (OUTPATIENT)
Dept: RADIOLOGY | Facility: HOSPITAL | Age: 56
Discharge: HOME | End: 2021-03-01
Attending: RADIOLOGY
Payer: COMMERCIAL

## 2021-03-01 DIAGNOSIS — C61 PROSTATE CANCER: ICD-10-CM

## 2021-03-01 DIAGNOSIS — Z90.79 STATUS POST PROSTATECTOMY: ICD-10-CM

## 2021-03-01 PROCEDURE — 78306 BONE IMAGING WHOLE BODY: CPT | Mod: 26,,, | Performed by: RADIOLOGY

## 2021-03-01 PROCEDURE — A9503 TC99M MEDRONATE: HCPCS

## 2021-03-01 PROCEDURE — 78306 NM BONE SCAN WHOLE BODY: ICD-10-PCS | Mod: 26,,, | Performed by: RADIOLOGY

## 2021-03-03 ENCOUNTER — HOSPITAL ENCOUNTER (OUTPATIENT)
Dept: RADIOLOGY | Facility: HOSPITAL | Age: 56
Discharge: HOME | End: 2021-03-03
Attending: RADIOLOGY
Payer: COMMERCIAL

## 2021-03-03 DIAGNOSIS — Z90.79 STATUS POST PROSTATECTOMY: ICD-10-CM

## 2021-03-03 DIAGNOSIS — C61 PROSTATE CANCER: ICD-10-CM

## 2021-03-03 PROCEDURE — 74177 CT ABD & PELVIS W/CONTRAST: CPT | Mod: 26,,, | Performed by: RADIOLOGY

## 2021-03-03 PROCEDURE — 25500020 PHARM REV CODE 255: Performed by: RADIOLOGY

## 2021-03-03 PROCEDURE — 74177 CT CHEST ABDOMEN PELVIS WITH CONTRAST (XPD): ICD-10-PCS | Mod: 26,,, | Performed by: RADIOLOGY

## 2021-03-03 PROCEDURE — 71260 CT THORAX DX C+: CPT | Mod: 26,,, | Performed by: RADIOLOGY

## 2021-03-03 PROCEDURE — 71260 CT CHEST ABDOMEN PELVIS WITH CONTRAST (XPD): ICD-10-PCS | Mod: 26,,, | Performed by: RADIOLOGY

## 2021-03-03 PROCEDURE — 74177 CT ABD & PELVIS W/CONTRAST: CPT | Mod: TC

## 2021-03-03 RX ADMIN — IOHEXOL 75 ML: 350 INJECTION, SOLUTION INTRAVENOUS at 01:03

## 2021-03-05 ENCOUNTER — PATIENT MESSAGE (OUTPATIENT)
Dept: RADIATION ONCOLOGY | Facility: CLINIC | Age: 56
End: 2021-03-05

## 2021-03-05 DIAGNOSIS — C61 PROSTATE CANCER: Primary | ICD-10-CM

## 2021-03-05 DIAGNOSIS — Z00.6 EXAMINATION OF PARTICIPANT IN CLINICAL TRIAL: ICD-10-CM

## 2021-03-10 ENCOUNTER — LAB VISIT (OUTPATIENT)
Dept: LAB | Facility: HOSPITAL | Age: 56
End: 2021-03-10
Payer: COMMERCIAL

## 2021-03-10 DIAGNOSIS — Z00.6 EXAMINATION OF PARTICIPANT IN CLINICAL TRIAL: ICD-10-CM

## 2021-03-10 DIAGNOSIS — C61 PROSTATE CANCER: ICD-10-CM

## 2021-03-10 LAB
ALBUMIN SERPL BCP-MCNC: 4.3 G/DL (ref 3.5–5.2)
ALP SERPL-CCNC: 77 U/L (ref 55–135)
ALT SERPL W/O P-5'-P-CCNC: 35 U/L (ref 10–44)
ANION GAP SERPL CALC-SCNC: 10 MMOL/L (ref 8–16)
AST SERPL-CCNC: 29 U/L (ref 10–40)
BASOPHILS # BLD AUTO: 0.06 K/UL (ref 0–0.2)
BASOPHILS NFR BLD: 1.1 % (ref 0–1.9)
BILIRUB SERPL-MCNC: 0.4 MG/DL (ref 0.1–1)
BUN SERPL-MCNC: 14 MG/DL (ref 6–20)
CALCIUM SERPL-MCNC: 9.4 MG/DL (ref 8.7–10.5)
CHLORIDE SERPL-SCNC: 106 MMOL/L (ref 95–110)
CO2 SERPL-SCNC: 26 MMOL/L (ref 23–29)
COMPLEXED PSA SERPL-MCNC: 1.9 NG/ML (ref 0–4)
CREAT SERPL-MCNC: 1.3 MG/DL (ref 0.5–1.4)
DIFFERENTIAL METHOD: ABNORMAL
EOSINOPHIL # BLD AUTO: 0.1 K/UL (ref 0–0.5)
EOSINOPHIL NFR BLD: 2.3 % (ref 0–8)
ERYTHROCYTE [DISTWIDTH] IN BLOOD BY AUTOMATED COUNT: 13 % (ref 11.5–14.5)
EST. GFR  (AFRICAN AMERICAN): >60 ML/MIN/1.73 M^2
EST. GFR  (NON AFRICAN AMERICAN): >60 ML/MIN/1.73 M^2
GLUCOSE SERPL-MCNC: 89 MG/DL (ref 70–110)
HCT VFR BLD AUTO: 45.6 % (ref 40–54)
HGB BLD-MCNC: 15.2 G/DL (ref 14–18)
IMM GRANULOCYTES # BLD AUTO: 0.01 K/UL (ref 0–0.04)
IMM GRANULOCYTES NFR BLD AUTO: 0.2 % (ref 0–0.5)
LYMPHOCYTES # BLD AUTO: 3 K/UL (ref 1–4.8)
LYMPHOCYTES NFR BLD: 53.9 % (ref 18–48)
MCH RBC QN AUTO: 29.2 PG (ref 27–31)
MCHC RBC AUTO-ENTMCNC: 33.3 G/DL (ref 32–36)
MCV RBC AUTO: 88 FL (ref 82–98)
MONOCYTES # BLD AUTO: 0.5 K/UL (ref 0.3–1)
MONOCYTES NFR BLD: 9.5 % (ref 4–15)
NEUTROPHILS # BLD AUTO: 1.8 K/UL (ref 1.8–7.7)
NEUTROPHILS NFR BLD: 33 % (ref 38–73)
NRBC BLD-RTO: 0 /100 WBC
PLATELET # BLD AUTO: 218 K/UL (ref 150–350)
PLATELET BLD QL SMEAR: ABNORMAL
PMV BLD AUTO: 10.7 FL (ref 9.2–12.9)
POTASSIUM SERPL-SCNC: 4 MMOL/L (ref 3.5–5.1)
PROT SERPL-MCNC: 7.8 G/DL (ref 6–8.4)
RBC # BLD AUTO: 5.2 M/UL (ref 4.6–6.2)
SMUDGE CELLS BLD QL SMEAR: PRESENT
SODIUM SERPL-SCNC: 142 MMOL/L (ref 136–145)
TESTOST SERPL-MCNC: 287 NG/DL (ref 304–1227)
WBC # BLD AUTO: 5.57 K/UL (ref 3.9–12.7)

## 2021-03-10 PROCEDURE — 80053 COMPREHEN METABOLIC PANEL: CPT | Performed by: RADIOLOGY

## 2021-03-10 PROCEDURE — 84403 ASSAY OF TOTAL TESTOSTERONE: CPT | Performed by: RADIOLOGY

## 2021-03-10 PROCEDURE — 84153 ASSAY OF PSA TOTAL: CPT | Performed by: RADIOLOGY

## 2021-03-10 PROCEDURE — 36415 COLL VENOUS BLD VENIPUNCTURE: CPT | Performed by: RADIOLOGY

## 2021-03-10 PROCEDURE — 85025 COMPLETE CBC W/AUTO DIFF WBC: CPT | Performed by: RADIOLOGY

## 2021-03-11 ENCOUNTER — RESEARCH ENCOUNTER (OUTPATIENT)
Dept: RESEARCH | Facility: HOSPITAL | Age: 56
End: 2021-03-11

## 2021-03-11 ENCOUNTER — PATIENT MESSAGE (OUTPATIENT)
Dept: RADIATION ONCOLOGY | Facility: CLINIC | Age: 56
End: 2021-03-11

## 2021-03-11 DIAGNOSIS — C61 PROSTATE CANCER: Primary | ICD-10-CM

## 2021-03-11 RX ORDER — BICALUTAMIDE 50 MG/1
50 TABLET, FILM COATED ORAL DAILY
Qty: 90 TABLET | Refills: 1 | Status: SHIPPED | OUTPATIENT
Start: 2021-03-11 | End: 2021-06-14

## 2021-03-12 DIAGNOSIS — Z00.6 EXAMINATION OF PARTICIPANT IN CLINICAL TRIAL: ICD-10-CM

## 2021-03-12 DIAGNOSIS — C61 PROSTATE CANCER: Primary | ICD-10-CM

## 2021-03-15 ENCOUNTER — PATIENT MESSAGE (OUTPATIENT)
Dept: RADIATION ONCOLOGY | Facility: CLINIC | Age: 56
End: 2021-03-15

## 2021-03-18 ENCOUNTER — OFFICE VISIT (OUTPATIENT)
Dept: RADIATION ONCOLOGY | Facility: CLINIC | Age: 56
End: 2021-03-18
Payer: COMMERCIAL

## 2021-03-18 ENCOUNTER — LAB VISIT (OUTPATIENT)
Dept: LAB | Facility: HOSPITAL | Age: 56
End: 2021-03-18
Payer: COMMERCIAL

## 2021-03-18 VITALS
RESPIRATION RATE: 17 BRPM | SYSTOLIC BLOOD PRESSURE: 133 MMHG | WEIGHT: 178.38 LBS | HEIGHT: 65 IN | DIASTOLIC BLOOD PRESSURE: 88 MMHG | HEART RATE: 82 BPM | BODY MASS INDEX: 29.72 KG/M2 | TEMPERATURE: 97 F

## 2021-03-18 DIAGNOSIS — C61 PROSTATE CANCER: ICD-10-CM

## 2021-03-18 DIAGNOSIS — Z00.6 EXAMINATION OF PARTICIPANT IN CLINICAL TRIAL: ICD-10-CM

## 2021-03-18 DIAGNOSIS — C61 PROSTATE CANCER: Primary | ICD-10-CM

## 2021-03-18 LAB
DRUG STUDY SPECIMEN TYPE: NORMAL
DRUG STUDY TEST NAME: NORMAL
DRUG STUDY TEST RESULT: NORMAL

## 2021-03-18 PROCEDURE — 3075F SYST BP GE 130 - 139MM HG: CPT | Mod: CPTII,S$GLB,, | Performed by: RADIOLOGY

## 2021-03-18 PROCEDURE — 99000 SPECIMEN HANDLING OFFICE-LAB: CPT | Performed by: RADIOLOGY

## 2021-03-18 PROCEDURE — 99999 PR PBB SHADOW E&M-EST. PATIENT-LVL III: CPT | Mod: PBBFAC,,, | Performed by: RADIOLOGY

## 2021-03-18 PROCEDURE — 96402 PR CHEMOTHER HORMON ANTINEOPL SUB-Q/IM: ICD-10-PCS | Mod: S$GLB,,, | Performed by: RADIOLOGY

## 2021-03-18 PROCEDURE — 99212 OFFICE O/P EST SF 10 MIN: CPT | Mod: 25,S$GLB,, | Performed by: RADIOLOGY

## 2021-03-18 PROCEDURE — 3079F DIAST BP 80-89 MM HG: CPT | Mod: CPTII,S$GLB,, | Performed by: RADIOLOGY

## 2021-03-18 PROCEDURE — 3008F PR BODY MASS INDEX (BMI) DOCUMENTED: ICD-10-PCS | Mod: CPTII,S$GLB,, | Performed by: RADIOLOGY

## 2021-03-18 PROCEDURE — 99212 PR OFFICE/OUTPT VISIT, EST, LEVL II, 10-19 MIN: ICD-10-PCS | Mod: 25,S$GLB,, | Performed by: RADIOLOGY

## 2021-03-18 PROCEDURE — 3079F PR MOST RECENT DIASTOLIC BLOOD PRESSURE 80-89 MM HG: ICD-10-PCS | Mod: CPTII,S$GLB,, | Performed by: RADIOLOGY

## 2021-03-18 PROCEDURE — 3008F BODY MASS INDEX DOCD: CPT | Mod: CPTII,S$GLB,, | Performed by: RADIOLOGY

## 2021-03-18 PROCEDURE — 99999 PR PBB SHADOW E&M-EST. PATIENT-LVL III: ICD-10-PCS | Mod: PBBFAC,,, | Performed by: RADIOLOGY

## 2021-03-18 PROCEDURE — 36415 COLL VENOUS BLD VENIPUNCTURE: CPT | Performed by: RADIOLOGY

## 2021-03-18 PROCEDURE — 96402 CHEMO HORMON ANTINEOPL SQ/IM: CPT | Mod: S$GLB,,, | Performed by: RADIOLOGY

## 2021-03-18 PROCEDURE — 3075F PR MOST RECENT SYSTOLIC BLOOD PRESS GE 130-139MM HG: ICD-10-PCS | Mod: CPTII,S$GLB,, | Performed by: RADIOLOGY

## 2021-03-24 ENCOUNTER — PATIENT MESSAGE (OUTPATIENT)
Dept: RADIATION ONCOLOGY | Facility: CLINIC | Age: 56
End: 2021-03-24

## 2021-04-30 ENCOUNTER — RESEARCH ENCOUNTER (OUTPATIENT)
Dept: RESEARCH | Facility: HOSPITAL | Age: 56
End: 2021-04-30

## 2021-04-30 ENCOUNTER — PATIENT MESSAGE (OUTPATIENT)
Dept: RADIATION ONCOLOGY | Facility: CLINIC | Age: 56
End: 2021-04-30

## 2021-04-30 ENCOUNTER — HOSPITAL ENCOUNTER (OUTPATIENT)
Dept: RADIATION THERAPY | Facility: HOSPITAL | Age: 56
Discharge: HOME | End: 2021-04-30
Attending: RADIOLOGY
Payer: COMMERCIAL

## 2021-04-30 PROCEDURE — 77014 HC CT GUIDANCE RADIATION THERAPY FLDS PLACEMENT: CPT | Mod: TC | Performed by: RADIOLOGY

## 2021-04-30 PROCEDURE — 77334 PR  RADN TREATMENT AID(S) COMPLX: ICD-10-PCS | Mod: 26,,, | Performed by: RADIOLOGY

## 2021-04-30 PROCEDURE — 77290 THER RAD SIMULAJ FIELD CPLX: CPT | Mod: TC | Performed by: RADIOLOGY

## 2021-04-30 PROCEDURE — 77334 RADIATION TREATMENT AID(S): CPT | Mod: 26,,, | Performed by: RADIOLOGY

## 2021-04-30 PROCEDURE — 77263 PR  RADIATION THERAPY PLAN COMPLEX: ICD-10-PCS | Mod: ,,, | Performed by: RADIOLOGY

## 2021-04-30 PROCEDURE — 77334 RADIATION TREATMENT AID(S): CPT | Mod: TC | Performed by: RADIOLOGY

## 2021-04-30 PROCEDURE — 77263 THER RADIOLOGY TX PLNG CPLX: CPT | Mod: ,,, | Performed by: RADIOLOGY

## 2021-05-03 ENCOUNTER — HOSPITAL ENCOUNTER (OUTPATIENT)
Dept: RADIATION THERAPY | Facility: HOSPITAL | Age: 56
Discharge: HOME | End: 2021-05-03
Attending: RADIOLOGY
Payer: COMMERCIAL

## 2021-05-03 DIAGNOSIS — C61 PROSTATE CANCER: Primary | ICD-10-CM

## 2021-05-03 RX ORDER — MEGESTROL ACETATE 40 MG/1
TABLET ORAL
Qty: 30 TABLET | Refills: 1 | Status: SHIPPED | OUTPATIENT
Start: 2021-05-03 | End: 2021-07-02

## 2021-05-06 ENCOUNTER — PATIENT MESSAGE (OUTPATIENT)
Dept: UROLOGY | Facility: CLINIC | Age: 56
End: 2021-05-06

## 2021-05-06 PROCEDURE — 77301 RADIOTHERAPY DOSE PLAN IMRT: CPT | Mod: 26,,, | Performed by: RADIOLOGY

## 2021-05-06 PROCEDURE — 77301 RADIOTHERAPY DOSE PLAN IMRT: CPT | Mod: TC | Performed by: RADIOLOGY

## 2021-05-06 PROCEDURE — 77301 PR  INTEN MOD RADIOTHER PLAN W/DOSE VOL HIST: ICD-10-PCS | Mod: 26,,, | Performed by: RADIOLOGY

## 2021-05-07 PROCEDURE — 77338 PR  MLC IMRT DESIGN & CONSTRUCTION PER IMRT PLAN: ICD-10-PCS | Mod: 26,,, | Performed by: RADIOLOGY

## 2021-05-07 PROCEDURE — 77300 RADIATION THERAPY DOSE PLAN: CPT | Mod: TC | Performed by: RADIOLOGY

## 2021-05-07 PROCEDURE — 77300 RADIATION THERAPY DOSE PLAN: CPT | Mod: 26,,, | Performed by: RADIOLOGY

## 2021-05-07 PROCEDURE — 77338 DESIGN MLC DEVICE FOR IMRT: CPT | Mod: TC | Performed by: RADIOLOGY

## 2021-05-07 PROCEDURE — 77300 PR RADIATION THERAPY,DOSIMETRY PLAN: ICD-10-PCS | Mod: 26,,, | Performed by: RADIOLOGY

## 2021-05-07 PROCEDURE — 77338 DESIGN MLC DEVICE FOR IMRT: CPT | Mod: 26,,, | Performed by: RADIOLOGY

## 2021-05-10 ENCOUNTER — DOCUMENTATION ONLY (OUTPATIENT)
Dept: RADIATION ONCOLOGY | Facility: CLINIC | Age: 56
End: 2021-05-10

## 2021-05-10 PROCEDURE — 77014 PR  CT GUIDANCE PLACEMENT RAD THERAPY FIELDS: ICD-10-PCS | Mod: 26,,, | Performed by: RADIOLOGY

## 2021-05-10 PROCEDURE — 77014 PR  CT GUIDANCE PLACEMENT RAD THERAPY FIELDS: CPT | Mod: 26,,, | Performed by: RADIOLOGY

## 2021-05-10 PROCEDURE — 77014 HC CT GUIDANCE RADIATION THERAPY FLDS PLACEMENT: CPT | Mod: TC | Performed by: RADIOLOGY

## 2021-05-10 PROCEDURE — 77385 HC IMRT, SIMPLE: CPT | Performed by: RADIOLOGY

## 2021-05-11 PROCEDURE — 77014 HC CT GUIDANCE RADIATION THERAPY FLDS PLACEMENT: CPT | Mod: TC | Performed by: RADIOLOGY

## 2021-05-11 PROCEDURE — 77014 PR  CT GUIDANCE PLACEMENT RAD THERAPY FIELDS: ICD-10-PCS | Mod: 26,,, | Performed by: RADIOLOGY

## 2021-05-11 PROCEDURE — 77385 HC IMRT, SIMPLE: CPT | Performed by: RADIOLOGY

## 2021-05-11 PROCEDURE — 77014 PR  CT GUIDANCE PLACEMENT RAD THERAPY FIELDS: CPT | Mod: 26,,, | Performed by: RADIOLOGY

## 2021-05-12 PROCEDURE — 77014 PR  CT GUIDANCE PLACEMENT RAD THERAPY FIELDS: CPT | Mod: 26,,, | Performed by: RADIOLOGY

## 2021-05-12 PROCEDURE — 77014 HC CT GUIDANCE RADIATION THERAPY FLDS PLACEMENT: CPT | Mod: TC | Performed by: RADIOLOGY

## 2021-05-12 PROCEDURE — 77385 HC IMRT, SIMPLE: CPT | Performed by: RADIOLOGY

## 2021-05-12 PROCEDURE — 77014 PR  CT GUIDANCE PLACEMENT RAD THERAPY FIELDS: ICD-10-PCS | Mod: 26,,, | Performed by: RADIOLOGY

## 2021-05-13 ENCOUNTER — DOCUMENTATION ONLY (OUTPATIENT)
Dept: RADIATION ONCOLOGY | Facility: CLINIC | Age: 56
End: 2021-05-13

## 2021-05-13 PROCEDURE — 77300 PR RADIATION THERAPY,DOSIMETRY PLAN: ICD-10-PCS | Mod: 26,,, | Performed by: RADIOLOGY

## 2021-05-13 PROCEDURE — 77338 PR  MLC IMRT DESIGN & CONSTRUCTION PER IMRT PLAN: ICD-10-PCS | Mod: 26,,, | Performed by: RADIOLOGY

## 2021-05-13 PROCEDURE — 77300 RADIATION THERAPY DOSE PLAN: CPT | Mod: TC | Performed by: RADIOLOGY

## 2021-05-13 PROCEDURE — 77014 PR  CT GUIDANCE PLACEMENT RAD THERAPY FIELDS: ICD-10-PCS | Mod: 26,,, | Performed by: RADIOLOGY

## 2021-05-13 PROCEDURE — 77338 DESIGN MLC DEVICE FOR IMRT: CPT | Mod: TC | Performed by: RADIOLOGY

## 2021-05-13 PROCEDURE — 77385 HC IMRT, SIMPLE: CPT | Performed by: RADIOLOGY

## 2021-05-13 PROCEDURE — 77338 DESIGN MLC DEVICE FOR IMRT: CPT | Mod: 26,,, | Performed by: RADIOLOGY

## 2021-05-13 PROCEDURE — 77014 HC CT GUIDANCE RADIATION THERAPY FLDS PLACEMENT: CPT | Mod: TC | Performed by: RADIOLOGY

## 2021-05-13 PROCEDURE — 77014 PR  CT GUIDANCE PLACEMENT RAD THERAPY FIELDS: CPT | Mod: 26,,, | Performed by: RADIOLOGY

## 2021-05-13 PROCEDURE — 77300 RADIATION THERAPY DOSE PLAN: CPT | Mod: 26,,, | Performed by: RADIOLOGY

## 2021-05-14 PROCEDURE — 77385 HC IMRT, SIMPLE: CPT | Performed by: RADIOLOGY

## 2021-05-14 PROCEDURE — 77014 PR  CT GUIDANCE PLACEMENT RAD THERAPY FIELDS: CPT | Mod: 26,,, | Performed by: RADIOLOGY

## 2021-05-14 PROCEDURE — 77014 HC CT GUIDANCE RADIATION THERAPY FLDS PLACEMENT: CPT | Mod: TC | Performed by: RADIOLOGY

## 2021-05-14 PROCEDURE — 77014 PR  CT GUIDANCE PLACEMENT RAD THERAPY FIELDS: ICD-10-PCS | Mod: 26,,, | Performed by: RADIOLOGY

## 2021-05-17 PROCEDURE — 77336 RADIATION PHYSICS CONSULT: CPT | Performed by: RADIOLOGY

## 2021-05-17 PROCEDURE — 77385 HC IMRT, SIMPLE: CPT | Performed by: RADIOLOGY

## 2021-05-17 PROCEDURE — 77014 PR  CT GUIDANCE PLACEMENT RAD THERAPY FIELDS: CPT | Mod: 26,,, | Performed by: RADIOLOGY

## 2021-05-17 PROCEDURE — 77014 HC CT GUIDANCE RADIATION THERAPY FLDS PLACEMENT: CPT | Mod: TC | Performed by: RADIOLOGY

## 2021-05-17 PROCEDURE — 77014 PR  CT GUIDANCE PLACEMENT RAD THERAPY FIELDS: ICD-10-PCS | Mod: 26,,, | Performed by: RADIOLOGY

## 2021-05-18 PROCEDURE — 77014 PR  CT GUIDANCE PLACEMENT RAD THERAPY FIELDS: ICD-10-PCS | Mod: 26,,, | Performed by: RADIOLOGY

## 2021-05-18 PROCEDURE — 77014 PR  CT GUIDANCE PLACEMENT RAD THERAPY FIELDS: CPT | Mod: 26,,, | Performed by: RADIOLOGY

## 2021-05-18 PROCEDURE — 77014 HC CT GUIDANCE RADIATION THERAPY FLDS PLACEMENT: CPT | Mod: TC | Performed by: RADIOLOGY

## 2021-05-18 PROCEDURE — 77385 HC IMRT, SIMPLE: CPT | Performed by: RADIOLOGY

## 2021-05-19 PROCEDURE — 77014 PR  CT GUIDANCE PLACEMENT RAD THERAPY FIELDS: CPT | Mod: 26,,, | Performed by: RADIOLOGY

## 2021-05-19 PROCEDURE — 77385 HC IMRT, SIMPLE: CPT | Performed by: RADIOLOGY

## 2021-05-19 PROCEDURE — 77014 PR  CT GUIDANCE PLACEMENT RAD THERAPY FIELDS: ICD-10-PCS | Mod: 26,,, | Performed by: RADIOLOGY

## 2021-05-19 PROCEDURE — 77014 HC CT GUIDANCE RADIATION THERAPY FLDS PLACEMENT: CPT | Mod: TC | Performed by: RADIOLOGY

## 2021-05-20 ENCOUNTER — DOCUMENTATION ONLY (OUTPATIENT)
Dept: RADIATION ONCOLOGY | Facility: CLINIC | Age: 56
End: 2021-05-20

## 2021-05-20 PROCEDURE — 77014 HC CT GUIDANCE RADIATION THERAPY FLDS PLACEMENT: CPT | Mod: TC | Performed by: RADIOLOGY

## 2021-05-20 PROCEDURE — 77385 HC IMRT, SIMPLE: CPT | Performed by: RADIOLOGY

## 2021-05-20 PROCEDURE — 77014 PR  CT GUIDANCE PLACEMENT RAD THERAPY FIELDS: ICD-10-PCS | Mod: 26,,, | Performed by: RADIOLOGY

## 2021-05-20 PROCEDURE — 77014 PR  CT GUIDANCE PLACEMENT RAD THERAPY FIELDS: CPT | Mod: 26,,, | Performed by: RADIOLOGY

## 2021-05-21 PROCEDURE — 77014 PR  CT GUIDANCE PLACEMENT RAD THERAPY FIELDS: CPT | Mod: 26,,, | Performed by: RADIOLOGY

## 2021-05-21 PROCEDURE — 77014 PR  CT GUIDANCE PLACEMENT RAD THERAPY FIELDS: ICD-10-PCS | Mod: 26,,, | Performed by: RADIOLOGY

## 2021-05-21 PROCEDURE — 77385 HC IMRT, SIMPLE: CPT | Performed by: RADIOLOGY

## 2021-05-21 PROCEDURE — 77014 HC CT GUIDANCE RADIATION THERAPY FLDS PLACEMENT: CPT | Mod: TC | Performed by: RADIOLOGY

## 2021-05-24 PROCEDURE — 77014 HC CT GUIDANCE RADIATION THERAPY FLDS PLACEMENT: CPT | Mod: TC | Performed by: RADIOLOGY

## 2021-05-24 PROCEDURE — 77014 PR  CT GUIDANCE PLACEMENT RAD THERAPY FIELDS: ICD-10-PCS | Mod: 26,,, | Performed by: RADIOLOGY

## 2021-05-24 PROCEDURE — 77014 PR  CT GUIDANCE PLACEMENT RAD THERAPY FIELDS: CPT | Mod: 26,,, | Performed by: RADIOLOGY

## 2021-05-24 PROCEDURE — 77336 RADIATION PHYSICS CONSULT: CPT | Performed by: RADIOLOGY

## 2021-05-24 PROCEDURE — 77385 HC IMRT, SIMPLE: CPT | Performed by: RADIOLOGY

## 2021-05-25 ENCOUNTER — LAB VISIT (OUTPATIENT)
Dept: LAB | Facility: HOSPITAL | Age: 56
End: 2021-05-25
Attending: UROLOGY
Payer: COMMERCIAL

## 2021-05-25 ENCOUNTER — OFFICE VISIT (OUTPATIENT)
Dept: UROLOGY | Facility: CLINIC | Age: 56
End: 2021-05-25
Payer: COMMERCIAL

## 2021-05-25 VITALS
BODY MASS INDEX: 27.95 KG/M2 | HEIGHT: 65 IN | DIASTOLIC BLOOD PRESSURE: 79 MMHG | WEIGHT: 167.75 LBS | HEART RATE: 86 BPM | SYSTOLIC BLOOD PRESSURE: 131 MMHG

## 2021-05-25 DIAGNOSIS — Z00.00 GENERAL MEDICAL EXAM: ICD-10-CM

## 2021-05-25 DIAGNOSIS — C61 PROSTATE CANCER: ICD-10-CM

## 2021-05-25 DIAGNOSIS — C61 PROSTATE CANCER: Primary | ICD-10-CM

## 2021-05-25 LAB — COMPLEXED PSA SERPL-MCNC: <0.01 NG/ML (ref 0–4)

## 2021-05-25 PROCEDURE — 3008F BODY MASS INDEX DOCD: CPT | Mod: CPTII,S$GLB,, | Performed by: UROLOGY

## 2021-05-25 PROCEDURE — 77014 HC CT GUIDANCE RADIATION THERAPY FLDS PLACEMENT: CPT | Mod: TC | Performed by: RADIOLOGY

## 2021-05-25 PROCEDURE — 77385 HC IMRT, SIMPLE: CPT | Performed by: RADIOLOGY

## 2021-05-25 PROCEDURE — 99212 PR OFFICE/OUTPT VISIT, EST, LEVL II, 10-19 MIN: ICD-10-PCS | Mod: S$GLB,,, | Performed by: UROLOGY

## 2021-05-25 PROCEDURE — 36415 COLL VENOUS BLD VENIPUNCTURE: CPT | Performed by: UROLOGY

## 2021-05-25 PROCEDURE — 99999 PR PBB SHADOW E&M-EST. PATIENT-LVL III: CPT | Mod: PBBFAC,,, | Performed by: UROLOGY

## 2021-05-25 PROCEDURE — 99212 OFFICE O/P EST SF 10 MIN: CPT | Mod: S$GLB,,, | Performed by: UROLOGY

## 2021-05-25 PROCEDURE — 77014 PR  CT GUIDANCE PLACEMENT RAD THERAPY FIELDS: CPT | Mod: 26,,, | Performed by: RADIOLOGY

## 2021-05-25 PROCEDURE — 1126F PR PAIN SEVERITY QUANTIFIED, NO PAIN PRESENT: ICD-10-PCS | Mod: S$GLB,,, | Performed by: UROLOGY

## 2021-05-25 PROCEDURE — 1126F AMNT PAIN NOTED NONE PRSNT: CPT | Mod: S$GLB,,, | Performed by: UROLOGY

## 2021-05-25 PROCEDURE — 3008F PR BODY MASS INDEX (BMI) DOCUMENTED: ICD-10-PCS | Mod: CPTII,S$GLB,, | Performed by: UROLOGY

## 2021-05-25 PROCEDURE — 84153 ASSAY OF PSA TOTAL: CPT | Performed by: UROLOGY

## 2021-05-25 PROCEDURE — 77014 PR  CT GUIDANCE PLACEMENT RAD THERAPY FIELDS: ICD-10-PCS | Mod: 26,,, | Performed by: RADIOLOGY

## 2021-05-25 PROCEDURE — 99999 PR PBB SHADOW E&M-EST. PATIENT-LVL III: ICD-10-PCS | Mod: PBBFAC,,, | Performed by: UROLOGY

## 2021-05-26 PROCEDURE — 77014 PR  CT GUIDANCE PLACEMENT RAD THERAPY FIELDS: ICD-10-PCS | Mod: 26,,, | Performed by: RADIOLOGY

## 2021-05-26 PROCEDURE — 77014 PR  CT GUIDANCE PLACEMENT RAD THERAPY FIELDS: CPT | Mod: 26,,, | Performed by: RADIOLOGY

## 2021-05-26 PROCEDURE — 77385 HC IMRT, SIMPLE: CPT | Performed by: RADIOLOGY

## 2021-05-26 PROCEDURE — 77014 HC CT GUIDANCE RADIATION THERAPY FLDS PLACEMENT: CPT | Mod: TC | Performed by: RADIOLOGY

## 2021-05-27 ENCOUNTER — DOCUMENTATION ONLY (OUTPATIENT)
Dept: RADIATION ONCOLOGY | Facility: CLINIC | Age: 56
End: 2021-05-27

## 2021-05-27 PROCEDURE — 77014 PR  CT GUIDANCE PLACEMENT RAD THERAPY FIELDS: ICD-10-PCS | Mod: 26,,, | Performed by: RADIOLOGY

## 2021-05-27 PROCEDURE — 77014 PR  CT GUIDANCE PLACEMENT RAD THERAPY FIELDS: CPT | Mod: 26,,, | Performed by: RADIOLOGY

## 2021-05-27 PROCEDURE — 77385 HC IMRT, SIMPLE: CPT | Performed by: RADIOLOGY

## 2021-05-27 PROCEDURE — 77014 HC CT GUIDANCE RADIATION THERAPY FLDS PLACEMENT: CPT | Mod: TC | Performed by: RADIOLOGY

## 2021-05-28 PROCEDURE — 77014 PR  CT GUIDANCE PLACEMENT RAD THERAPY FIELDS: CPT | Mod: 26,,, | Performed by: RADIOLOGY

## 2021-05-28 PROCEDURE — 77385 HC IMRT, SIMPLE: CPT | Performed by: RADIOLOGY

## 2021-05-28 PROCEDURE — 77014 HC CT GUIDANCE RADIATION THERAPY FLDS PLACEMENT: CPT | Mod: TC | Performed by: RADIOLOGY

## 2021-05-28 PROCEDURE — 77014 PR  CT GUIDANCE PLACEMENT RAD THERAPY FIELDS: ICD-10-PCS | Mod: 26,,, | Performed by: RADIOLOGY

## 2021-05-31 ENCOUNTER — PATIENT OUTREACH (OUTPATIENT)
Dept: ADMINISTRATIVE | Facility: OTHER | Age: 56
End: 2021-05-31

## 2021-05-31 PROCEDURE — 77014 HC CT GUIDANCE RADIATION THERAPY FLDS PLACEMENT: CPT | Mod: TC | Performed by: RADIOLOGY

## 2021-05-31 PROCEDURE — 77014 PR  CT GUIDANCE PLACEMENT RAD THERAPY FIELDS: CPT | Mod: 26,,, | Performed by: RADIOLOGY

## 2021-05-31 PROCEDURE — 77014 PR  CT GUIDANCE PLACEMENT RAD THERAPY FIELDS: ICD-10-PCS | Mod: 26,,, | Performed by: RADIOLOGY

## 2021-05-31 PROCEDURE — 77385 HC IMRT, SIMPLE: CPT | Performed by: RADIOLOGY

## 2021-06-01 ENCOUNTER — HOSPITAL ENCOUNTER (OUTPATIENT)
Dept: RADIATION THERAPY | Facility: HOSPITAL | Age: 56
Discharge: HOME | End: 2021-06-01
Attending: RADIOLOGY
Payer: COMMERCIAL

## 2021-06-01 PROCEDURE — 77014 PR  CT GUIDANCE PLACEMENT RAD THERAPY FIELDS: ICD-10-PCS | Mod: 26,,, | Performed by: RADIOLOGY

## 2021-06-01 PROCEDURE — 77385 HC IMRT, SIMPLE: CPT | Performed by: RADIOLOGY

## 2021-06-01 PROCEDURE — 77014 PR  CT GUIDANCE PLACEMENT RAD THERAPY FIELDS: CPT | Mod: 26,,, | Performed by: RADIOLOGY

## 2021-06-01 PROCEDURE — 77014 HC CT GUIDANCE RADIATION THERAPY FLDS PLACEMENT: CPT | Mod: TC | Performed by: RADIOLOGY

## 2021-06-01 PROCEDURE — 77336 RADIATION PHYSICS CONSULT: CPT | Performed by: RADIOLOGY

## 2021-06-02 ENCOUNTER — OFFICE VISIT (OUTPATIENT)
Dept: UROLOGY | Facility: CLINIC | Age: 56
End: 2021-06-02
Payer: COMMERCIAL

## 2021-06-02 VITALS
HEIGHT: 65 IN | HEART RATE: 87 BPM | BODY MASS INDEX: 28.21 KG/M2 | WEIGHT: 169.31 LBS | DIASTOLIC BLOOD PRESSURE: 85 MMHG | SYSTOLIC BLOOD PRESSURE: 125 MMHG

## 2021-06-02 DIAGNOSIS — N52.31 ERECTILE DYSFUNCTION FOLLOWING RADICAL PROSTATECTOMY: Primary | ICD-10-CM

## 2021-06-02 DIAGNOSIS — I10 ESSENTIAL HYPERTENSION: ICD-10-CM

## 2021-06-02 DIAGNOSIS — C61 PROSTATE CANCER: ICD-10-CM

## 2021-06-02 PROCEDURE — 77014 PR  CT GUIDANCE PLACEMENT RAD THERAPY FIELDS: ICD-10-PCS | Mod: 26,,, | Performed by: RADIOLOGY

## 2021-06-02 PROCEDURE — 99999 PR PBB SHADOW E&M-EST. PATIENT-LVL IV: ICD-10-PCS | Mod: PBBFAC,,, | Performed by: UROLOGY

## 2021-06-02 PROCEDURE — 99214 PR OFFICE/OUTPT VISIT, EST, LEVL IV, 30-39 MIN: ICD-10-PCS | Mod: S$GLB,,, | Performed by: UROLOGY

## 2021-06-02 PROCEDURE — 77385 HC IMRT, SIMPLE: CPT | Performed by: RADIOLOGY

## 2021-06-02 PROCEDURE — 77014 PR  CT GUIDANCE PLACEMENT RAD THERAPY FIELDS: CPT | Mod: 26,,, | Performed by: RADIOLOGY

## 2021-06-02 PROCEDURE — 3008F BODY MASS INDEX DOCD: CPT | Mod: CPTII,S$GLB,, | Performed by: UROLOGY

## 2021-06-02 PROCEDURE — 99999 PR PBB SHADOW E&M-EST. PATIENT-LVL IV: CPT | Mod: PBBFAC,,, | Performed by: UROLOGY

## 2021-06-02 PROCEDURE — 77014 HC CT GUIDANCE RADIATION THERAPY FLDS PLACEMENT: CPT | Mod: TC | Performed by: RADIOLOGY

## 2021-06-02 PROCEDURE — 1126F AMNT PAIN NOTED NONE PRSNT: CPT | Mod: S$GLB,,, | Performed by: UROLOGY

## 2021-06-02 PROCEDURE — 3008F PR BODY MASS INDEX (BMI) DOCUMENTED: ICD-10-PCS | Mod: CPTII,S$GLB,, | Performed by: UROLOGY

## 2021-06-02 PROCEDURE — 99214 OFFICE O/P EST MOD 30 MIN: CPT | Mod: S$GLB,,, | Performed by: UROLOGY

## 2021-06-02 PROCEDURE — 1126F PR PAIN SEVERITY QUANTIFIED, NO PAIN PRESENT: ICD-10-PCS | Mod: S$GLB,,, | Performed by: UROLOGY

## 2021-06-03 ENCOUNTER — DOCUMENTATION ONLY (OUTPATIENT)
Dept: RADIATION ONCOLOGY | Facility: CLINIC | Age: 56
End: 2021-06-03

## 2021-06-03 PROCEDURE — 77385 HC IMRT, SIMPLE: CPT | Performed by: RADIOLOGY

## 2021-06-03 PROCEDURE — 77014 HC CT GUIDANCE RADIATION THERAPY FLDS PLACEMENT: CPT | Mod: TC | Performed by: RADIOLOGY

## 2021-06-03 PROCEDURE — 77014 PR  CT GUIDANCE PLACEMENT RAD THERAPY FIELDS: CPT | Mod: 26,,, | Performed by: RADIOLOGY

## 2021-06-03 PROCEDURE — 77014 PR  CT GUIDANCE PLACEMENT RAD THERAPY FIELDS: ICD-10-PCS | Mod: 26,,, | Performed by: RADIOLOGY

## 2021-06-04 PROCEDURE — 77014 PR  CT GUIDANCE PLACEMENT RAD THERAPY FIELDS: CPT | Mod: 26,,, | Performed by: RADIOLOGY

## 2021-06-04 PROCEDURE — 77385 HC IMRT, SIMPLE: CPT | Performed by: RADIOLOGY

## 2021-06-04 PROCEDURE — 77014 PR  CT GUIDANCE PLACEMENT RAD THERAPY FIELDS: ICD-10-PCS | Mod: 26,,, | Performed by: RADIOLOGY

## 2021-06-04 PROCEDURE — 77014 HC CT GUIDANCE RADIATION THERAPY FLDS PLACEMENT: CPT | Mod: TC | Performed by: RADIOLOGY

## 2021-06-07 PROCEDURE — 77014 PR  CT GUIDANCE PLACEMENT RAD THERAPY FIELDS: ICD-10-PCS | Mod: 26,,, | Performed by: RADIOLOGY

## 2021-06-07 PROCEDURE — 77385 HC IMRT, SIMPLE: CPT | Performed by: RADIOLOGY

## 2021-06-07 PROCEDURE — 77014 PR  CT GUIDANCE PLACEMENT RAD THERAPY FIELDS: CPT | Mod: 26,,, | Performed by: RADIOLOGY

## 2021-06-07 PROCEDURE — 77014 HC CT GUIDANCE RADIATION THERAPY FLDS PLACEMENT: CPT | Mod: TC | Performed by: RADIOLOGY

## 2021-06-08 ENCOUNTER — RESEARCH ENCOUNTER (OUTPATIENT)
Dept: RESEARCH | Facility: HOSPITAL | Age: 56
End: 2021-06-08

## 2021-06-08 PROCEDURE — 77014 HC CT GUIDANCE RADIATION THERAPY FLDS PLACEMENT: CPT | Mod: TC | Performed by: RADIOLOGY

## 2021-06-08 PROCEDURE — 77014 PR  CT GUIDANCE PLACEMENT RAD THERAPY FIELDS: CPT | Mod: 26,,, | Performed by: RADIOLOGY

## 2021-06-08 PROCEDURE — 77014 PR  CT GUIDANCE PLACEMENT RAD THERAPY FIELDS: ICD-10-PCS | Mod: 26,,, | Performed by: RADIOLOGY

## 2021-06-08 PROCEDURE — 77385 HC IMRT, SIMPLE: CPT | Performed by: RADIOLOGY

## 2021-06-08 PROCEDURE — 77336 RADIATION PHYSICS CONSULT: CPT | Performed by: RADIOLOGY

## 2021-06-09 PROCEDURE — 77014 PR  CT GUIDANCE PLACEMENT RAD THERAPY FIELDS: CPT | Mod: 26,,, | Performed by: RADIOLOGY

## 2021-06-09 PROCEDURE — 77014 PR  CT GUIDANCE PLACEMENT RAD THERAPY FIELDS: ICD-10-PCS | Mod: 26,,, | Performed by: RADIOLOGY

## 2021-06-09 PROCEDURE — 77385 HC IMRT, SIMPLE: CPT | Performed by: RADIOLOGY

## 2021-06-09 PROCEDURE — 77014 HC CT GUIDANCE RADIATION THERAPY FLDS PLACEMENT: CPT | Mod: TC | Performed by: RADIOLOGY

## 2021-06-10 ENCOUNTER — DOCUMENTATION ONLY (OUTPATIENT)
Dept: RADIATION ONCOLOGY | Facility: CLINIC | Age: 56
End: 2021-06-10

## 2021-06-10 PROCEDURE — 77014 PR  CT GUIDANCE PLACEMENT RAD THERAPY FIELDS: ICD-10-PCS | Mod: 26,,, | Performed by: RADIOLOGY

## 2021-06-10 PROCEDURE — 77014 PR  CT GUIDANCE PLACEMENT RAD THERAPY FIELDS: CPT | Mod: 26,,, | Performed by: RADIOLOGY

## 2021-06-10 PROCEDURE — 77014 HC CT GUIDANCE RADIATION THERAPY FLDS PLACEMENT: CPT | Mod: TC | Performed by: RADIOLOGY

## 2021-06-10 PROCEDURE — 77385 HC IMRT, SIMPLE: CPT | Performed by: RADIOLOGY

## 2021-06-11 PROCEDURE — 77014 HC CT GUIDANCE RADIATION THERAPY FLDS PLACEMENT: CPT | Mod: TC | Performed by: RADIOLOGY

## 2021-06-11 PROCEDURE — 77385 HC IMRT, SIMPLE: CPT | Performed by: RADIOLOGY

## 2021-06-11 PROCEDURE — 77014 PR  CT GUIDANCE PLACEMENT RAD THERAPY FIELDS: CPT | Mod: 26,,, | Performed by: RADIOLOGY

## 2021-06-11 PROCEDURE — 77014 PR  CT GUIDANCE PLACEMENT RAD THERAPY FIELDS: ICD-10-PCS | Mod: 26,,, | Performed by: RADIOLOGY

## 2021-06-14 PROCEDURE — 77014 PR  CT GUIDANCE PLACEMENT RAD THERAPY FIELDS: CPT | Mod: 26,,, | Performed by: RADIOLOGY

## 2021-06-14 PROCEDURE — 77014 PR  CT GUIDANCE PLACEMENT RAD THERAPY FIELDS: ICD-10-PCS | Mod: 26,,, | Performed by: RADIOLOGY

## 2021-06-14 PROCEDURE — 77385 HC IMRT, SIMPLE: CPT | Performed by: RADIOLOGY

## 2021-06-14 PROCEDURE — 77014 HC CT GUIDANCE RADIATION THERAPY FLDS PLACEMENT: CPT | Mod: TC | Performed by: RADIOLOGY

## 2021-06-15 PROCEDURE — 77014 HC CT GUIDANCE RADIATION THERAPY FLDS PLACEMENT: CPT | Mod: TC | Performed by: RADIOLOGY

## 2021-06-15 PROCEDURE — 77385 HC IMRT, SIMPLE: CPT | Performed by: RADIOLOGY

## 2021-06-15 PROCEDURE — 77336 RADIATION PHYSICS CONSULT: CPT | Performed by: RADIOLOGY

## 2021-06-15 PROCEDURE — 77014 PR  CT GUIDANCE PLACEMENT RAD THERAPY FIELDS: ICD-10-PCS | Mod: 26,,, | Performed by: RADIOLOGY

## 2021-06-15 PROCEDURE — 77014 PR  CT GUIDANCE PLACEMENT RAD THERAPY FIELDS: CPT | Mod: 26,,, | Performed by: RADIOLOGY

## 2021-06-16 ENCOUNTER — LAB VISIT (OUTPATIENT)
Dept: LAB | Facility: HOSPITAL | Age: 56
End: 2021-06-16
Payer: COMMERCIAL

## 2021-06-16 ENCOUNTER — RESEARCH ENCOUNTER (OUTPATIENT)
Dept: RESEARCH | Facility: HOSPITAL | Age: 56
End: 2021-06-16

## 2021-06-16 DIAGNOSIS — Z00.6 EXAMINATION OF PARTICIPANT IN CLINICAL TRIAL: ICD-10-CM

## 2021-06-16 DIAGNOSIS — C61 PROSTATE CANCER: ICD-10-CM

## 2021-06-16 LAB
DRUG STUDY SPECIMEN TYPE: NORMAL
DRUG STUDY TEST NAME: NORMAL
DRUG STUDY TEST RESULT: NORMAL

## 2021-06-16 PROCEDURE — 77385 HC IMRT, SIMPLE: CPT | Performed by: RADIOLOGY

## 2021-06-16 PROCEDURE — 36415 COLL VENOUS BLD VENIPUNCTURE: CPT | Performed by: RADIOLOGY

## 2021-06-16 PROCEDURE — 77014 PR  CT GUIDANCE PLACEMENT RAD THERAPY FIELDS: CPT | Mod: 26,,, | Performed by: RADIOLOGY

## 2021-06-16 PROCEDURE — 77014 HC CT GUIDANCE RADIATION THERAPY FLDS PLACEMENT: CPT | Mod: TC | Performed by: RADIOLOGY

## 2021-06-16 PROCEDURE — 99000 SPECIMEN HANDLING OFFICE-LAB: CPT | Performed by: RADIOLOGY

## 2021-06-16 PROCEDURE — 77014 PR  CT GUIDANCE PLACEMENT RAD THERAPY FIELDS: ICD-10-PCS | Mod: 26,,, | Performed by: RADIOLOGY

## 2021-06-17 ENCOUNTER — DOCUMENTATION ONLY (OUTPATIENT)
Dept: RADIATION ONCOLOGY | Facility: CLINIC | Age: 56
End: 2021-06-17

## 2021-06-17 DIAGNOSIS — I10 ESSENTIAL HYPERTENSION: ICD-10-CM

## 2021-06-17 DIAGNOSIS — C61 PROSTATE CANCER: Primary | ICD-10-CM

## 2021-06-17 DIAGNOSIS — Z00.00 ANNUAL PHYSICAL EXAM: Primary | ICD-10-CM

## 2021-06-17 PROCEDURE — 77014 PR  CT GUIDANCE PLACEMENT RAD THERAPY FIELDS: CPT | Mod: 26,,, | Performed by: RADIOLOGY

## 2021-06-17 PROCEDURE — 77014 PR  CT GUIDANCE PLACEMENT RAD THERAPY FIELDS: ICD-10-PCS | Mod: 26,,, | Performed by: RADIOLOGY

## 2021-06-17 PROCEDURE — 77385 HC IMRT, SIMPLE: CPT | Performed by: RADIOLOGY

## 2021-06-17 PROCEDURE — 77014 HC CT GUIDANCE RADIATION THERAPY FLDS PLACEMENT: CPT | Mod: TC | Performed by: RADIOLOGY

## 2021-06-17 RX ORDER — LOSARTAN POTASSIUM 100 MG/1
100 TABLET ORAL DAILY
Qty: 30 TABLET | Refills: 11 | Status: CANCELLED | OUTPATIENT
Start: 2021-06-17

## 2021-06-17 RX ORDER — LOSARTAN POTASSIUM 100 MG/1
TABLET ORAL
Qty: 30 TABLET | Refills: 2 | Status: SHIPPED | OUTPATIENT
Start: 2021-06-17 | End: 2021-09-15

## 2021-06-18 ENCOUNTER — LAB VISIT (OUTPATIENT)
Dept: LAB | Facility: HOSPITAL | Age: 56
End: 2021-06-18
Attending: RADIOLOGY
Payer: COMMERCIAL

## 2021-06-18 DIAGNOSIS — C61 PROSTATE CANCER: ICD-10-CM

## 2021-06-18 LAB
ALBUMIN SERPL BCP-MCNC: 4 G/DL (ref 3.5–5.2)
ALP SERPL-CCNC: 75 U/L (ref 55–135)
ALT SERPL W/O P-5'-P-CCNC: 30 U/L (ref 10–44)
AST SERPL-CCNC: 23 U/L (ref 10–40)
BILIRUB DIRECT SERPL-MCNC: 0.1 MG/DL (ref 0.1–0.3)
BILIRUB SERPL-MCNC: 0.2 MG/DL (ref 0.1–1)
PROT SERPL-MCNC: 7.2 G/DL (ref 6–8.4)

## 2021-06-18 PROCEDURE — 80076 HEPATIC FUNCTION PANEL: CPT | Performed by: RADIOLOGY

## 2021-06-18 PROCEDURE — 77014 PR  CT GUIDANCE PLACEMENT RAD THERAPY FIELDS: ICD-10-PCS | Mod: 26,,, | Performed by: RADIOLOGY

## 2021-06-18 PROCEDURE — 77014 HC CT GUIDANCE RADIATION THERAPY FLDS PLACEMENT: CPT | Mod: TC | Performed by: RADIOLOGY

## 2021-06-18 PROCEDURE — 77385 HC IMRT, SIMPLE: CPT | Performed by: RADIOLOGY

## 2021-06-18 PROCEDURE — 77014 PR  CT GUIDANCE PLACEMENT RAD THERAPY FIELDS: CPT | Mod: 26,,, | Performed by: RADIOLOGY

## 2021-06-18 PROCEDURE — 36415 COLL VENOUS BLD VENIPUNCTURE: CPT | Performed by: RADIOLOGY

## 2021-06-21 PROCEDURE — 77385 HC IMRT, SIMPLE: CPT | Performed by: RADIOLOGY

## 2021-06-21 PROCEDURE — 77014 PR  CT GUIDANCE PLACEMENT RAD THERAPY FIELDS: CPT | Mod: 26,,, | Performed by: RADIOLOGY

## 2021-06-21 PROCEDURE — 77014 PR  CT GUIDANCE PLACEMENT RAD THERAPY FIELDS: ICD-10-PCS | Mod: 26,,, | Performed by: RADIOLOGY

## 2021-06-21 PROCEDURE — 77014 HC CT GUIDANCE RADIATION THERAPY FLDS PLACEMENT: CPT | Mod: TC | Performed by: RADIOLOGY

## 2021-06-22 PROCEDURE — 77014 HC CT GUIDANCE RADIATION THERAPY FLDS PLACEMENT: CPT | Mod: TC | Performed by: RADIOLOGY

## 2021-06-22 PROCEDURE — 77014 PR  CT GUIDANCE PLACEMENT RAD THERAPY FIELDS: ICD-10-PCS | Mod: 26,,, | Performed by: RADIOLOGY

## 2021-06-22 PROCEDURE — 77385 HC IMRT, SIMPLE: CPT | Performed by: RADIOLOGY

## 2021-06-22 PROCEDURE — 77014 PR  CT GUIDANCE PLACEMENT RAD THERAPY FIELDS: CPT | Mod: 26,,, | Performed by: RADIOLOGY

## 2021-06-22 PROCEDURE — 77336 RADIATION PHYSICS CONSULT: CPT | Performed by: RADIOLOGY

## 2021-06-23 PROCEDURE — 77014 HC CT GUIDANCE RADIATION THERAPY FLDS PLACEMENT: CPT | Mod: TC | Performed by: RADIOLOGY

## 2021-06-23 PROCEDURE — 77014 PR  CT GUIDANCE PLACEMENT RAD THERAPY FIELDS: ICD-10-PCS | Mod: 26,,, | Performed by: RADIOLOGY

## 2021-06-23 PROCEDURE — 77014 PR  CT GUIDANCE PLACEMENT RAD THERAPY FIELDS: CPT | Mod: 26,,, | Performed by: RADIOLOGY

## 2021-06-23 PROCEDURE — 77385 HC IMRT, SIMPLE: CPT | Performed by: RADIOLOGY

## 2021-06-24 ENCOUNTER — DOCUMENTATION ONLY (OUTPATIENT)
Dept: RADIATION ONCOLOGY | Facility: CLINIC | Age: 56
End: 2021-06-24

## 2021-06-24 PROCEDURE — 77385 HC IMRT, SIMPLE: CPT | Performed by: RADIOLOGY

## 2021-06-24 PROCEDURE — 77014 PR  CT GUIDANCE PLACEMENT RAD THERAPY FIELDS: ICD-10-PCS | Mod: 26,,, | Performed by: RADIOLOGY

## 2021-06-24 PROCEDURE — 77014 HC CT GUIDANCE RADIATION THERAPY FLDS PLACEMENT: CPT | Mod: TC | Performed by: RADIOLOGY

## 2021-06-24 PROCEDURE — 77014 PR  CT GUIDANCE PLACEMENT RAD THERAPY FIELDS: CPT | Mod: 26,,, | Performed by: RADIOLOGY

## 2021-06-25 PROCEDURE — 77014 HC CT GUIDANCE RADIATION THERAPY FLDS PLACEMENT: CPT | Mod: TC | Performed by: RADIOLOGY

## 2021-06-25 PROCEDURE — 77014 PR  CT GUIDANCE PLACEMENT RAD THERAPY FIELDS: CPT | Mod: 26,,, | Performed by: RADIOLOGY

## 2021-06-25 PROCEDURE — 77014 PR  CT GUIDANCE PLACEMENT RAD THERAPY FIELDS: ICD-10-PCS | Mod: 26,,, | Performed by: RADIOLOGY

## 2021-06-25 PROCEDURE — 77385 HC IMRT, SIMPLE: CPT | Performed by: RADIOLOGY

## 2021-06-28 PROCEDURE — 77014 PR  CT GUIDANCE PLACEMENT RAD THERAPY FIELDS: CPT | Mod: 26,,, | Performed by: RADIOLOGY

## 2021-06-28 PROCEDURE — 77014 PR  CT GUIDANCE PLACEMENT RAD THERAPY FIELDS: ICD-10-PCS | Mod: 26,,, | Performed by: RADIOLOGY

## 2021-06-28 PROCEDURE — 77014 HC CT GUIDANCE RADIATION THERAPY FLDS PLACEMENT: CPT | Mod: TC | Performed by: RADIOLOGY

## 2021-06-28 PROCEDURE — 77385 HC IMRT, SIMPLE: CPT | Performed by: RADIOLOGY

## 2021-06-29 PROCEDURE — 77385 HC IMRT, SIMPLE: CPT | Performed by: RADIOLOGY

## 2021-06-29 PROCEDURE — 77336 RADIATION PHYSICS CONSULT: CPT | Performed by: RADIOLOGY

## 2021-06-29 PROCEDURE — 77014 HC CT GUIDANCE RADIATION THERAPY FLDS PLACEMENT: CPT | Mod: TC | Performed by: RADIOLOGY

## 2021-06-29 PROCEDURE — 77014 PR  CT GUIDANCE PLACEMENT RAD THERAPY FIELDS: ICD-10-PCS | Mod: 26,,, | Performed by: RADIOLOGY

## 2021-06-29 PROCEDURE — 77014 PR  CT GUIDANCE PLACEMENT RAD THERAPY FIELDS: CPT | Mod: 26,,, | Performed by: RADIOLOGY

## 2021-06-30 ENCOUNTER — LAB VISIT (OUTPATIENT)
Dept: LAB | Facility: HOSPITAL | Age: 56
End: 2021-06-30
Attending: HOSPITALIST
Payer: COMMERCIAL

## 2021-06-30 ENCOUNTER — DOCUMENTATION ONLY (OUTPATIENT)
Dept: RADIATION ONCOLOGY | Facility: CLINIC | Age: 56
End: 2021-06-30

## 2021-06-30 DIAGNOSIS — Z00.00 ANNUAL PHYSICAL EXAM: ICD-10-CM

## 2021-06-30 LAB
ALBUMIN SERPL BCP-MCNC: 3.9 G/DL (ref 3.5–5.2)
ALP SERPL-CCNC: 64 U/L (ref 55–135)
ALT SERPL W/O P-5'-P-CCNC: 30 U/L (ref 10–44)
ANION GAP SERPL CALC-SCNC: 9 MMOL/L (ref 8–16)
AST SERPL-CCNC: 30 U/L (ref 10–40)
BASOPHILS # BLD AUTO: 0.03 K/UL (ref 0–0.2)
BASOPHILS NFR BLD: 0.9 % (ref 0–1.9)
BILIRUB SERPL-MCNC: 0.2 MG/DL (ref 0.1–1)
BILIRUB UR QL STRIP: NEGATIVE
BUN SERPL-MCNC: 15 MG/DL (ref 6–20)
CALCIUM SERPL-MCNC: 9.4 MG/DL (ref 8.7–10.5)
CHLORIDE SERPL-SCNC: 107 MMOL/L (ref 95–110)
CHOLEST SERPL-MCNC: 151 MG/DL (ref 120–199)
CHOLEST/HDLC SERPL: 4.1 {RATIO} (ref 2–5)
CLARITY UR REFRACT.AUTO: CLEAR
CO2 SERPL-SCNC: 21 MMOL/L (ref 23–29)
COLOR UR AUTO: COLORLESS
CREAT SERPL-MCNC: 1 MG/DL (ref 0.5–1.4)
DIFFERENTIAL METHOD: ABNORMAL
EOSINOPHIL # BLD AUTO: 0.1 K/UL (ref 0–0.5)
EOSINOPHIL NFR BLD: 1.9 % (ref 0–8)
ERYTHROCYTE [DISTWIDTH] IN BLOOD BY AUTOMATED COUNT: 14 % (ref 11.5–14.5)
EST. GFR  (AFRICAN AMERICAN): >60 ML/MIN/1.73 M^2
EST. GFR  (NON AFRICAN AMERICAN): >60 ML/MIN/1.73 M^2
ESTIMATED AVG GLUCOSE: 154 MG/DL (ref 68–131)
GLUCOSE SERPL-MCNC: 105 MG/DL (ref 70–110)
GLUCOSE UR QL STRIP: NEGATIVE
HBA1C MFR BLD: 7 % (ref 4–5.6)
HCT VFR BLD AUTO: 34.9 % (ref 40–54)
HDLC SERPL-MCNC: 37 MG/DL (ref 40–75)
HDLC SERPL: 24.5 % (ref 20–50)
HGB BLD-MCNC: 12 G/DL (ref 14–18)
HGB UR QL STRIP: NEGATIVE
IMM GRANULOCYTES # BLD AUTO: 0.01 K/UL (ref 0–0.04)
IMM GRANULOCYTES NFR BLD AUTO: 0.3 % (ref 0–0.5)
KETONES UR QL STRIP: NEGATIVE
LDLC SERPL CALC-MCNC: 105 MG/DL (ref 63–159)
LEUKOCYTE ESTERASE UR QL STRIP: NEGATIVE
LYMPHOCYTES # BLD AUTO: 0.5 K/UL (ref 1–4.8)
LYMPHOCYTES NFR BLD: 16.2 % (ref 18–48)
MCH RBC QN AUTO: 30.2 PG (ref 27–31)
MCHC RBC AUTO-ENTMCNC: 34.4 G/DL (ref 32–36)
MCV RBC AUTO: 88 FL (ref 82–98)
MONOCYTES # BLD AUTO: 0.5 K/UL (ref 0.3–1)
MONOCYTES NFR BLD: 14 % (ref 4–15)
NEUTROPHILS # BLD AUTO: 2.1 K/UL (ref 1.8–7.7)
NEUTROPHILS NFR BLD: 66.7 % (ref 38–73)
NITRITE UR QL STRIP: NEGATIVE
NONHDLC SERPL-MCNC: 114 MG/DL
NRBC BLD-RTO: 0 /100 WBC
PH UR STRIP: 6 [PH] (ref 5–8)
PLATELET # BLD AUTO: 190 K/UL (ref 150–450)
PMV BLD AUTO: 9.7 FL (ref 9.2–12.9)
POTASSIUM SERPL-SCNC: 4 MMOL/L (ref 3.5–5.1)
PROT SERPL-MCNC: 7.3 G/DL (ref 6–8.4)
PROT UR QL STRIP: NEGATIVE
RBC # BLD AUTO: 3.98 M/UL (ref 4.6–6.2)
SODIUM SERPL-SCNC: 137 MMOL/L (ref 136–145)
SP GR UR STRIP: 1 (ref 1–1.03)
TRIGL SERPL-MCNC: 45 MG/DL (ref 30–150)
TSH SERPL DL<=0.005 MIU/L-ACNC: 2.48 UIU/ML (ref 0.4–4)
URN SPEC COLLECT METH UR: ABNORMAL
WBC # BLD AUTO: 3.21 K/UL (ref 3.9–12.7)

## 2021-06-30 PROCEDURE — 77014 HC CT GUIDANCE RADIATION THERAPY FLDS PLACEMENT: CPT | Mod: TC | Performed by: RADIOLOGY

## 2021-06-30 PROCEDURE — 77385 HC IMRT, SIMPLE: CPT | Performed by: RADIOLOGY

## 2021-06-30 PROCEDURE — 83036 HEMOGLOBIN GLYCOSYLATED A1C: CPT | Performed by: HOSPITALIST

## 2021-06-30 PROCEDURE — 81003 URINALYSIS AUTO W/O SCOPE: CPT | Performed by: HOSPITALIST

## 2021-06-30 PROCEDURE — 85025 COMPLETE CBC W/AUTO DIFF WBC: CPT | Performed by: HOSPITALIST

## 2021-06-30 PROCEDURE — 80053 COMPREHEN METABOLIC PANEL: CPT | Performed by: HOSPITALIST

## 2021-06-30 PROCEDURE — 77014 PR  CT GUIDANCE PLACEMENT RAD THERAPY FIELDS: ICD-10-PCS | Mod: 26,,, | Performed by: RADIOLOGY

## 2021-06-30 PROCEDURE — 84443 ASSAY THYROID STIM HORMONE: CPT | Performed by: HOSPITALIST

## 2021-06-30 PROCEDURE — 80061 LIPID PANEL: CPT | Performed by: HOSPITALIST

## 2021-06-30 PROCEDURE — 77014 PR  CT GUIDANCE PLACEMENT RAD THERAPY FIELDS: CPT | Mod: 26,,, | Performed by: RADIOLOGY

## 2021-06-30 PROCEDURE — 36415 COLL VENOUS BLD VENIPUNCTURE: CPT | Performed by: HOSPITALIST

## 2021-07-07 ENCOUNTER — LAB VISIT (OUTPATIENT)
Dept: LAB | Facility: HOSPITAL | Age: 56
End: 2021-07-07
Payer: COMMERCIAL

## 2021-07-07 ENCOUNTER — OFFICE VISIT (OUTPATIENT)
Dept: INTERNAL MEDICINE | Facility: CLINIC | Age: 56
End: 2021-07-07
Payer: COMMERCIAL

## 2021-07-07 VITALS
SYSTOLIC BLOOD PRESSURE: 110 MMHG | BODY MASS INDEX: 27.66 KG/M2 | DIASTOLIC BLOOD PRESSURE: 80 MMHG | HEIGHT: 65 IN | HEART RATE: 86 BPM | WEIGHT: 166 LBS | TEMPERATURE: 97 F | OXYGEN SATURATION: 99 %

## 2021-07-07 DIAGNOSIS — I15.2 HYPERTENSION ASSOCIATED WITH TYPE 2 DIABETES MELLITUS: ICD-10-CM

## 2021-07-07 DIAGNOSIS — C61 PROSTATE CANCER: ICD-10-CM

## 2021-07-07 DIAGNOSIS — E11.59 HYPERTENSION ASSOCIATED WITH TYPE 2 DIABETES MELLITUS: ICD-10-CM

## 2021-07-07 DIAGNOSIS — Z00.6 EXAMINATION OF PARTICIPANT IN CLINICAL TRIAL: ICD-10-CM

## 2021-07-07 DIAGNOSIS — Z00.00 ANNUAL PHYSICAL EXAM: Primary | ICD-10-CM

## 2021-07-07 DIAGNOSIS — E11.9 TYPE 2 DIABETES MELLITUS WITHOUT COMPLICATION, WITHOUT LONG-TERM CURRENT USE OF INSULIN: ICD-10-CM

## 2021-07-07 DIAGNOSIS — E11.9 NEWLY DIAGNOSED DIABETES: ICD-10-CM

## 2021-07-07 PROBLEM — R73.03 PREDIABETES: Status: RESOLVED | Noted: 2019-05-03 | Resolved: 2021-07-07

## 2021-07-07 LAB
ALBUMIN SERPL BCP-MCNC: 4 G/DL (ref 3.5–5.2)
ALP SERPL-CCNC: 64 U/L (ref 55–135)
ALT SERPL W/O P-5'-P-CCNC: 29 U/L (ref 10–44)
ANION GAP SERPL CALC-SCNC: 8 MMOL/L (ref 8–16)
AST SERPL-CCNC: 23 U/L (ref 10–40)
BASOPHILS # BLD AUTO: 0.03 K/UL (ref 0–0.2)
BASOPHILS NFR BLD: 0.8 % (ref 0–1.9)
BILIRUB SERPL-MCNC: 0.4 MG/DL (ref 0.1–1)
BUN SERPL-MCNC: 13 MG/DL (ref 6–20)
CALCIUM SERPL-MCNC: 9.8 MG/DL (ref 8.7–10.5)
CHLORIDE SERPL-SCNC: 108 MMOL/L (ref 95–110)
CO2 SERPL-SCNC: 23 MMOL/L (ref 23–29)
COMPLEXED PSA SERPL-MCNC: <0.01 NG/ML (ref 0–4)
CREAT SERPL-MCNC: 1 MG/DL (ref 0.5–1.4)
DIFFERENTIAL METHOD: ABNORMAL
DRUG STUDY SPECIMEN TYPE: NORMAL
DRUG STUDY TEST NAME: NORMAL
DRUG STUDY TEST RESULT: NORMAL
EOSINOPHIL # BLD AUTO: 0.1 K/UL (ref 0–0.5)
EOSINOPHIL NFR BLD: 2.2 % (ref 0–8)
ERYTHROCYTE [DISTWIDTH] IN BLOOD BY AUTOMATED COUNT: 14 % (ref 11.5–14.5)
EST. GFR  (AFRICAN AMERICAN): >60 ML/MIN/1.73 M^2
EST. GFR  (NON AFRICAN AMERICAN): >60 ML/MIN/1.73 M^2
GLUCOSE SERPL-MCNC: 130 MG/DL (ref 70–110)
HCT VFR BLD AUTO: 36.5 % (ref 40–54)
HGB BLD-MCNC: 12.6 G/DL (ref 14–18)
IMM GRANULOCYTES # BLD AUTO: 0.02 K/UL (ref 0–0.04)
IMM GRANULOCYTES NFR BLD AUTO: 0.5 % (ref 0–0.5)
LYMPHOCYTES # BLD AUTO: 0.6 K/UL (ref 1–4.8)
LYMPHOCYTES NFR BLD: 15.4 % (ref 18–48)
MCH RBC QN AUTO: 30.4 PG (ref 27–31)
MCHC RBC AUTO-ENTMCNC: 34.5 G/DL (ref 32–36)
MCV RBC AUTO: 88 FL (ref 82–98)
MONOCYTES # BLD AUTO: 0.5 K/UL (ref 0.3–1)
MONOCYTES NFR BLD: 12.6 % (ref 4–15)
NEUTROPHILS # BLD AUTO: 2.5 K/UL (ref 1.8–7.7)
NEUTROPHILS NFR BLD: 68.5 % (ref 38–73)
NRBC BLD-RTO: 0 /100 WBC
PLATELET # BLD AUTO: 218 K/UL (ref 150–450)
PMV BLD AUTO: 9.9 FL (ref 9.2–12.9)
POTASSIUM SERPL-SCNC: 4.2 MMOL/L (ref 3.5–5.1)
PROT SERPL-MCNC: 7.4 G/DL (ref 6–8.4)
RBC # BLD AUTO: 4.14 M/UL (ref 4.6–6.2)
SODIUM SERPL-SCNC: 139 MMOL/L (ref 136–145)
TESTOST SERPL-MCNC: 15 NG/DL (ref 304–1227)
WBC # BLD AUTO: 3.64 K/UL (ref 3.9–12.7)

## 2021-07-07 PROCEDURE — 90471 PNEUMOCOCCAL CONJUGATE VACCINE 13-VALENT LESS THAN 5YO & GREATER THAN: ICD-10-PCS | Mod: S$GLB,,, | Performed by: HOSPITALIST

## 2021-07-07 PROCEDURE — 3051F HG A1C>EQUAL 7.0%<8.0%: CPT | Mod: CPTII,S$GLB,, | Performed by: HOSPITALIST

## 2021-07-07 PROCEDURE — 84403 ASSAY OF TOTAL TESTOSTERONE: CPT | Performed by: RADIOLOGY

## 2021-07-07 PROCEDURE — 99000 SPECIMEN HANDLING OFFICE-LAB: CPT | Performed by: RADIOLOGY

## 2021-07-07 PROCEDURE — 99999 PR PBB SHADOW E&M-EST. PATIENT-LVL IV: CPT | Mod: PBBFAC,,, | Performed by: HOSPITALIST

## 2021-07-07 PROCEDURE — 36415 COLL VENOUS BLD VENIPUNCTURE: CPT | Performed by: RADIOLOGY

## 2021-07-07 PROCEDURE — 90471 IMMUNIZATION ADMIN: CPT | Mod: S$GLB,,, | Performed by: HOSPITALIST

## 2021-07-07 PROCEDURE — 3008F BODY MASS INDEX DOCD: CPT | Mod: CPTII,S$GLB,, | Performed by: HOSPITALIST

## 2021-07-07 PROCEDURE — 1126F AMNT PAIN NOTED NONE PRSNT: CPT | Mod: S$GLB,,, | Performed by: HOSPITALIST

## 2021-07-07 PROCEDURE — 90670 PCV13 VACCINE IM: CPT | Mod: S$GLB,,, | Performed by: HOSPITALIST

## 2021-07-07 PROCEDURE — 99396 PREV VISIT EST AGE 40-64: CPT | Mod: 25,S$GLB,, | Performed by: HOSPITALIST

## 2021-07-07 PROCEDURE — 99396 PR PREVENTIVE VISIT,EST,40-64: ICD-10-PCS | Mod: 25,S$GLB,, | Performed by: HOSPITALIST

## 2021-07-07 PROCEDURE — 3051F PR MOST RECENT HEMOGLOBIN A1C LEVEL 7.0 - < 8.0%: ICD-10-PCS | Mod: CPTII,S$GLB,, | Performed by: HOSPITALIST

## 2021-07-07 PROCEDURE — 84153 ASSAY OF PSA TOTAL: CPT | Performed by: RADIOLOGY

## 2021-07-07 PROCEDURE — 90670 PNEUMOCOCCAL CONJUGATE VACCINE 13-VALENT LESS THAN 5YO & GREATER THAN: ICD-10-PCS | Mod: S$GLB,,, | Performed by: HOSPITALIST

## 2021-07-07 PROCEDURE — 80053 COMPREHEN METABOLIC PANEL: CPT | Performed by: RADIOLOGY

## 2021-07-07 PROCEDURE — 85025 COMPLETE CBC W/AUTO DIFF WBC: CPT | Performed by: RADIOLOGY

## 2021-07-07 PROCEDURE — 3008F PR BODY MASS INDEX (BMI) DOCUMENTED: ICD-10-PCS | Mod: CPTII,S$GLB,, | Performed by: HOSPITALIST

## 2021-07-07 PROCEDURE — 99999 PR PBB SHADOW E&M-EST. PATIENT-LVL IV: ICD-10-PCS | Mod: PBBFAC,,, | Performed by: HOSPITALIST

## 2021-07-07 PROCEDURE — 1126F PR PAIN SEVERITY QUANTIFIED, NO PAIN PRESENT: ICD-10-PCS | Mod: S$GLB,,, | Performed by: HOSPITALIST

## 2021-07-07 RX ORDER — METFORMIN HYDROCHLORIDE 500 MG/1
500 TABLET ORAL
Qty: 90 TABLET | Refills: 3 | Status: SHIPPED | OUTPATIENT
Start: 2021-07-07 | End: 2021-10-07

## 2021-07-07 RX ORDER — METFORMIN HYDROCHLORIDE 500 MG/1
500 TABLET ORAL
Qty: 90 TABLET | Refills: 3 | Status: SHIPPED | OUTPATIENT
Start: 2021-10-07 | End: 2021-07-07

## 2021-07-07 RX ORDER — METFORMIN HYDROCHLORIDE 500 MG/1
500 TABLET ORAL
Qty: 90 TABLET | Refills: 3 | Status: SHIPPED | OUTPATIENT
Start: 2021-07-07 | End: 2021-07-07

## 2021-07-08 ENCOUNTER — TELEPHONE (OUTPATIENT)
Dept: RADIATION ONCOLOGY | Facility: CLINIC | Age: 56
End: 2021-07-08

## 2021-07-09 ENCOUNTER — TELEPHONE (OUTPATIENT)
Dept: ADMINISTRATIVE | Facility: HOSPITAL | Age: 56
End: 2021-07-09

## 2021-07-28 DIAGNOSIS — C61 PROSTATE CANCER: Primary | ICD-10-CM

## 2021-08-17 ENCOUNTER — RESEARCH ENCOUNTER (OUTPATIENT)
Dept: RESEARCH | Facility: HOSPITAL | Age: 56
End: 2021-08-17

## 2021-08-31 ENCOUNTER — PATIENT MESSAGE (OUTPATIENT)
Dept: RADIATION ONCOLOGY | Facility: CLINIC | Age: 56
End: 2021-08-31

## 2021-09-01 DIAGNOSIS — C61 PROSTATE CANCER: ICD-10-CM

## 2021-09-01 RX ORDER — MEGESTROL ACETATE 40 MG/1
TABLET ORAL
Qty: 50 TABLET | Refills: 0 | Status: SHIPPED | OUTPATIENT
Start: 2021-09-01 | End: 2021-09-17 | Stop reason: SDUPTHER

## 2021-09-02 ENCOUNTER — PATIENT MESSAGE (OUTPATIENT)
Dept: INTERNAL MEDICINE | Facility: CLINIC | Age: 56
End: 2021-09-02

## 2021-09-02 RX ORDER — LANCETS 28 GAUGE
1 EACH MISCELLANEOUS 2 TIMES DAILY
Qty: 180 EACH | Refills: 3 | Status: SHIPPED | OUTPATIENT
Start: 2021-09-02 | End: 2022-11-14

## 2021-09-03 ENCOUNTER — PATIENT OUTREACH (OUTPATIENT)
Dept: DIABETES | Facility: CLINIC | Age: 56
End: 2021-09-03

## 2021-09-03 ENCOUNTER — PATIENT MESSAGE (OUTPATIENT)
Dept: DIABETES | Facility: CLINIC | Age: 56
End: 2021-09-03

## 2021-09-07 ENCOUNTER — CLINICAL SUPPORT (OUTPATIENT)
Dept: DIABETES | Facility: CLINIC | Age: 56
End: 2021-09-07
Payer: COMMERCIAL

## 2021-09-07 ENCOUNTER — PATIENT MESSAGE (OUTPATIENT)
Dept: RADIATION ONCOLOGY | Facility: CLINIC | Age: 56
End: 2021-09-07

## 2021-09-07 DIAGNOSIS — E11.9 TYPE 2 DIABETES MELLITUS WITHOUT COMPLICATION, WITHOUT LONG-TERM CURRENT USE OF INSULIN: ICD-10-CM

## 2021-09-07 DIAGNOSIS — E11.9 NEWLY DIAGNOSED DIABETES: ICD-10-CM

## 2021-09-07 PROCEDURE — G0108 PR DIAB MANAGE TRN  PER INDIV: ICD-10-PCS | Mod: 95,,, | Performed by: INTERNAL MEDICINE

## 2021-09-07 PROCEDURE — G0108 DIAB MANAGE TRN  PER INDIV: HCPCS | Mod: 95,,, | Performed by: INTERNAL MEDICINE

## 2021-09-14 ENCOUNTER — PATIENT MESSAGE (OUTPATIENT)
Dept: RESEARCH | Facility: HOSPITAL | Age: 56
End: 2021-09-14

## 2021-09-14 ENCOUNTER — TELEPHONE (OUTPATIENT)
Dept: RADIATION ONCOLOGY | Facility: CLINIC | Age: 56
End: 2021-09-14

## 2021-09-15 DIAGNOSIS — I10 ESSENTIAL HYPERTENSION: ICD-10-CM

## 2021-09-15 RX ORDER — LOSARTAN POTASSIUM 100 MG/1
100 TABLET ORAL DAILY
Qty: 30 TABLET | Refills: 2 | Status: CANCELLED | OUTPATIENT
Start: 2021-09-15

## 2021-09-17 ENCOUNTER — TELEPHONE (OUTPATIENT)
Dept: RADIATION ONCOLOGY | Facility: CLINIC | Age: 56
End: 2021-09-17

## 2021-09-17 DIAGNOSIS — C61 PROSTATE CANCER: ICD-10-CM

## 2021-09-17 RX ORDER — MEGESTROL ACETATE 40 MG/1
TABLET ORAL
Qty: 50 TABLET | Refills: 0 | OUTPATIENT
Start: 2021-09-17 | End: 2021-12-14

## 2021-09-20 ENCOUNTER — LAB VISIT (OUTPATIENT)
Dept: LAB | Facility: HOSPITAL | Age: 56
End: 2021-09-20
Attending: RADIOLOGY
Payer: COMMERCIAL

## 2021-09-20 DIAGNOSIS — C61 PROSTATE CANCER: ICD-10-CM

## 2021-09-20 DIAGNOSIS — Z00.6 EXAMINATION OF PARTICIPANT IN CLINICAL TRIAL: ICD-10-CM

## 2021-09-20 LAB
ALBUMIN SERPL BCP-MCNC: 4.1 G/DL (ref 3.5–5.2)
ALP SERPL-CCNC: 77 U/L (ref 55–135)
ALT SERPL W/O P-5'-P-CCNC: 29 U/L (ref 10–44)
ANION GAP SERPL CALC-SCNC: 11 MMOL/L (ref 8–16)
AST SERPL-CCNC: 25 U/L (ref 10–40)
BILIRUB SERPL-MCNC: 0.3 MG/DL (ref 0.1–1)
BUN SERPL-MCNC: 16 MG/DL (ref 6–20)
CALCIUM SERPL-MCNC: 9.9 MG/DL (ref 8.7–10.5)
CHLORIDE SERPL-SCNC: 109 MMOL/L (ref 95–110)
CO2 SERPL-SCNC: 23 MMOL/L (ref 23–29)
COMPLEXED PSA SERPL-MCNC: <0.01 NG/ML (ref 0–4)
CREAT SERPL-MCNC: 1 MG/DL (ref 0.5–1.4)
EST. GFR  (AFRICAN AMERICAN): >60 ML/MIN/1.73 M^2
EST. GFR  (NON AFRICAN AMERICAN): >60 ML/MIN/1.73 M^2
GLUCOSE SERPL-MCNC: 137 MG/DL (ref 70–110)
POTASSIUM SERPL-SCNC: 4.4 MMOL/L (ref 3.5–5.1)
PROT SERPL-MCNC: 7.2 G/DL (ref 6–8.4)
SODIUM SERPL-SCNC: 143 MMOL/L (ref 136–145)
TESTOST SERPL-MCNC: 27 NG/DL (ref 304–1227)

## 2021-09-20 PROCEDURE — 84403 ASSAY OF TOTAL TESTOSTERONE: CPT | Performed by: RADIOLOGY

## 2021-09-20 PROCEDURE — 36415 COLL VENOUS BLD VENIPUNCTURE: CPT | Performed by: RADIOLOGY

## 2021-09-20 PROCEDURE — 84153 ASSAY OF PSA TOTAL: CPT | Performed by: RADIOLOGY

## 2021-09-20 PROCEDURE — 80053 COMPREHEN METABOLIC PANEL: CPT | Performed by: RADIOLOGY

## 2021-09-23 ENCOUNTER — OFFICE VISIT (OUTPATIENT)
Dept: RADIATION ONCOLOGY | Facility: CLINIC | Age: 56
End: 2021-09-23
Payer: COMMERCIAL

## 2021-09-23 VITALS
DIASTOLIC BLOOD PRESSURE: 78 MMHG | RESPIRATION RATE: 16 BRPM | HEART RATE: 85 BPM | WEIGHT: 167.5 LBS | HEIGHT: 65 IN | SYSTOLIC BLOOD PRESSURE: 128 MMHG | OXYGEN SATURATION: 100 % | BODY MASS INDEX: 27.91 KG/M2

## 2021-09-23 DIAGNOSIS — C61 PROSTATE CANCER: Primary | ICD-10-CM

## 2021-09-23 PROCEDURE — 4010F PR ACE/ARB THEARPY RXD/TAKEN: ICD-10-PCS | Mod: CPTII,S$GLB,, | Performed by: RADIOLOGY

## 2021-09-23 PROCEDURE — 99999 PR PBB SHADOW E&M-EST. PATIENT-LVL IV: ICD-10-PCS | Mod: PBBFAC,,, | Performed by: RADIOLOGY

## 2021-09-23 PROCEDURE — 1159F PR MEDICATION LIST DOCUMENTED IN MEDICAL RECORD: ICD-10-PCS | Mod: CPTII,S$GLB,, | Performed by: RADIOLOGY

## 2021-09-23 PROCEDURE — 99499 UNLISTED E&M SERVICE: CPT | Mod: S$GLB,,, | Performed by: RADIOLOGY

## 2021-09-23 PROCEDURE — 3051F HG A1C>EQUAL 7.0%<8.0%: CPT | Mod: CPTII,S$GLB,, | Performed by: RADIOLOGY

## 2021-09-23 PROCEDURE — 1160F PR REVIEW ALL MEDS BY PRESCRIBER/CLIN PHARMACIST DOCUMENTED: ICD-10-PCS | Mod: CPTII,S$GLB,, | Performed by: RADIOLOGY

## 2021-09-23 PROCEDURE — 3078F PR MOST RECENT DIASTOLIC BLOOD PRESSURE < 80 MM HG: ICD-10-PCS | Mod: CPTII,S$GLB,, | Performed by: RADIOLOGY

## 2021-09-23 PROCEDURE — 3074F SYST BP LT 130 MM HG: CPT | Mod: CPTII,S$GLB,, | Performed by: RADIOLOGY

## 2021-09-23 PROCEDURE — 99999 PR PBB SHADOW E&M-EST. PATIENT-LVL IV: CPT | Mod: PBBFAC,,, | Performed by: RADIOLOGY

## 2021-09-23 PROCEDURE — 1160F RVW MEDS BY RX/DR IN RCRD: CPT | Mod: CPTII,S$GLB,, | Performed by: RADIOLOGY

## 2021-09-23 PROCEDURE — 3008F PR BODY MASS INDEX (BMI) DOCUMENTED: ICD-10-PCS | Mod: CPTII,S$GLB,, | Performed by: RADIOLOGY

## 2021-09-23 PROCEDURE — 4010F ACE/ARB THERAPY RXD/TAKEN: CPT | Mod: CPTII,S$GLB,, | Performed by: RADIOLOGY

## 2021-09-23 PROCEDURE — 1159F MED LIST DOCD IN RCRD: CPT | Mod: CPTII,S$GLB,, | Performed by: RADIOLOGY

## 2021-09-23 PROCEDURE — 3074F PR MOST RECENT SYSTOLIC BLOOD PRESSURE < 130 MM HG: ICD-10-PCS | Mod: CPTII,S$GLB,, | Performed by: RADIOLOGY

## 2021-09-23 PROCEDURE — 99499 NO LOS: ICD-10-PCS | Mod: S$GLB,,, | Performed by: RADIOLOGY

## 2021-09-23 PROCEDURE — 3051F PR MOST RECENT HEMOGLOBIN A1C LEVEL 7.0 - < 8.0%: ICD-10-PCS | Mod: CPTII,S$GLB,, | Performed by: RADIOLOGY

## 2021-09-23 PROCEDURE — 3008F BODY MASS INDEX DOCD: CPT | Mod: CPTII,S$GLB,, | Performed by: RADIOLOGY

## 2021-09-23 PROCEDURE — 3078F DIAST BP <80 MM HG: CPT | Mod: CPTII,S$GLB,, | Performed by: RADIOLOGY

## 2021-09-30 ENCOUNTER — CLINICAL SUPPORT (OUTPATIENT)
Dept: DIABETES | Facility: CLINIC | Age: 56
End: 2021-09-30
Payer: COMMERCIAL

## 2021-09-30 ENCOUNTER — LAB VISIT (OUTPATIENT)
Dept: LAB | Facility: HOSPITAL | Age: 56
End: 2021-09-30
Attending: HOSPITALIST
Payer: COMMERCIAL

## 2021-09-30 ENCOUNTER — PATIENT MESSAGE (OUTPATIENT)
Dept: HEMATOLOGY/ONCOLOGY | Facility: CLINIC | Age: 56
End: 2021-09-30

## 2021-09-30 DIAGNOSIS — E11.9 NEWLY DIAGNOSED DIABETES: ICD-10-CM

## 2021-09-30 DIAGNOSIS — E11.9 TYPE 2 DIABETES MELLITUS WITHOUT COMPLICATION, WITHOUT LONG-TERM CURRENT USE OF INSULIN: ICD-10-CM

## 2021-09-30 LAB
ESTIMATED AVG GLUCOSE: 131 MG/DL (ref 68–131)
HBA1C MFR BLD: 6.2 % (ref 4–5.6)

## 2021-09-30 PROCEDURE — 36415 COLL VENOUS BLD VENIPUNCTURE: CPT | Performed by: HOSPITALIST

## 2021-09-30 PROCEDURE — G0108 PR DIAB MANAGE TRN  PER INDIV: ICD-10-PCS | Mod: S$GLB,,, | Performed by: INTERNAL MEDICINE

## 2021-09-30 PROCEDURE — 83036 HEMOGLOBIN GLYCOSYLATED A1C: CPT | Performed by: HOSPITALIST

## 2021-09-30 PROCEDURE — G0108 DIAB MANAGE TRN  PER INDIV: HCPCS | Mod: S$GLB,,, | Performed by: INTERNAL MEDICINE

## 2021-10-07 ENCOUNTER — OFFICE VISIT (OUTPATIENT)
Dept: INTERNAL MEDICINE | Facility: CLINIC | Age: 56
End: 2021-10-07
Payer: COMMERCIAL

## 2021-10-07 VITALS
BODY MASS INDEX: 25.78 KG/M2 | RESPIRATION RATE: 16 BRPM | HEIGHT: 65 IN | WEIGHT: 154.75 LBS | HEART RATE: 78 BPM | TEMPERATURE: 98 F | DIASTOLIC BLOOD PRESSURE: 80 MMHG | SYSTOLIC BLOOD PRESSURE: 120 MMHG

## 2021-10-07 DIAGNOSIS — E11.59 HYPERTENSION ASSOCIATED WITH TYPE 2 DIABETES MELLITUS: ICD-10-CM

## 2021-10-07 DIAGNOSIS — I15.2 HYPERTENSION ASSOCIATED WITH TYPE 2 DIABETES MELLITUS: ICD-10-CM

## 2021-10-07 DIAGNOSIS — Z23 NEED FOR VACCINATION AGAINST STREPTOCOCCUS PNEUMONIAE: ICD-10-CM

## 2021-10-07 DIAGNOSIS — E11.9 TYPE 2 DIABETES MELLITUS WITHOUT COMPLICATION, WITHOUT LONG-TERM CURRENT USE OF INSULIN: Primary | ICD-10-CM

## 2021-10-07 DIAGNOSIS — E11.9 ENCOUNTER FOR DIABETIC FOOT EXAM: ICD-10-CM

## 2021-10-07 PROCEDURE — 1159F MED LIST DOCD IN RCRD: CPT | Mod: CPTII,S$GLB,, | Performed by: HOSPITALIST

## 2021-10-07 PROCEDURE — 90732 PNEUMOCOCCAL POLYSACCHARIDE VACCINE 23-VALENT =>2YO SQ IM: ICD-10-PCS | Mod: S$GLB,,, | Performed by: HOSPITALIST

## 2021-10-07 PROCEDURE — 99214 PR OFFICE/OUTPT VISIT, EST, LEVL IV, 30-39 MIN: ICD-10-PCS | Mod: 25,S$GLB,, | Performed by: HOSPITALIST

## 2021-10-07 PROCEDURE — 3008F BODY MASS INDEX DOCD: CPT | Mod: CPTII,S$GLB,, | Performed by: HOSPITALIST

## 2021-10-07 PROCEDURE — 3008F PR BODY MASS INDEX (BMI) DOCUMENTED: ICD-10-PCS | Mod: CPTII,S$GLB,, | Performed by: HOSPITALIST

## 2021-10-07 PROCEDURE — 3079F DIAST BP 80-89 MM HG: CPT | Mod: CPTII,S$GLB,, | Performed by: HOSPITALIST

## 2021-10-07 PROCEDURE — 99999 PR PBB SHADOW E&M-EST. PATIENT-LVL IV: CPT | Mod: PBBFAC,,, | Performed by: HOSPITALIST

## 2021-10-07 PROCEDURE — 90471 PNEUMOCOCCAL POLYSACCHARIDE VACCINE 23-VALENT =>2YO SQ IM: ICD-10-PCS | Mod: S$GLB,,, | Performed by: HOSPITALIST

## 2021-10-07 PROCEDURE — 4010F PR ACE/ARB THEARPY RXD/TAKEN: ICD-10-PCS | Mod: CPTII,S$GLB,, | Performed by: HOSPITALIST

## 2021-10-07 PROCEDURE — 1159F PR MEDICATION LIST DOCUMENTED IN MEDICAL RECORD: ICD-10-PCS | Mod: CPTII,S$GLB,, | Performed by: HOSPITALIST

## 2021-10-07 PROCEDURE — 4010F ACE/ARB THERAPY RXD/TAKEN: CPT | Mod: CPTII,S$GLB,, | Performed by: HOSPITALIST

## 2021-10-07 PROCEDURE — 3044F PR MOST RECENT HEMOGLOBIN A1C LEVEL <7.0%: ICD-10-PCS | Mod: CPTII,S$GLB,, | Performed by: HOSPITALIST

## 2021-10-07 PROCEDURE — 99999 PR PBB SHADOW E&M-EST. PATIENT-LVL IV: ICD-10-PCS | Mod: PBBFAC,,, | Performed by: HOSPITALIST

## 2021-10-07 PROCEDURE — 3079F PR MOST RECENT DIASTOLIC BLOOD PRESSURE 80-89 MM HG: ICD-10-PCS | Mod: CPTII,S$GLB,, | Performed by: HOSPITALIST

## 2021-10-07 PROCEDURE — 99214 OFFICE O/P EST MOD 30 MIN: CPT | Mod: 25,S$GLB,, | Performed by: HOSPITALIST

## 2021-10-07 PROCEDURE — 1160F PR REVIEW ALL MEDS BY PRESCRIBER/CLIN PHARMACIST DOCUMENTED: ICD-10-PCS | Mod: CPTII,S$GLB,, | Performed by: HOSPITALIST

## 2021-10-07 PROCEDURE — 3074F PR MOST RECENT SYSTOLIC BLOOD PRESSURE < 130 MM HG: ICD-10-PCS | Mod: CPTII,S$GLB,, | Performed by: HOSPITALIST

## 2021-10-07 PROCEDURE — 3074F SYST BP LT 130 MM HG: CPT | Mod: CPTII,S$GLB,, | Performed by: HOSPITALIST

## 2021-10-07 PROCEDURE — 3044F HG A1C LEVEL LT 7.0%: CPT | Mod: CPTII,S$GLB,, | Performed by: HOSPITALIST

## 2021-10-07 PROCEDURE — 90471 IMMUNIZATION ADMIN: CPT | Mod: S$GLB,,, | Performed by: HOSPITALIST

## 2021-10-07 PROCEDURE — 1160F RVW MEDS BY RX/DR IN RCRD: CPT | Mod: CPTII,S$GLB,, | Performed by: HOSPITALIST

## 2021-10-07 PROCEDURE — 90732 PPSV23 VACC 2 YRS+ SUBQ/IM: CPT | Mod: S$GLB,,, | Performed by: HOSPITALIST

## 2021-11-08 ENCOUNTER — LAB VISIT (OUTPATIENT)
Dept: LAB | Facility: HOSPITAL | Age: 56
End: 2021-11-08
Attending: UROLOGY
Payer: COMMERCIAL

## 2021-11-08 ENCOUNTER — PATIENT OUTREACH (OUTPATIENT)
Dept: ADMINISTRATIVE | Facility: OTHER | Age: 56
End: 2021-11-08
Payer: COMMERCIAL

## 2021-11-08 DIAGNOSIS — C61 PROSTATE CANCER: ICD-10-CM

## 2021-11-08 DIAGNOSIS — E11.9 TYPE 2 DIABETES MELLITUS WITHOUT COMPLICATION, WITHOUT LONG-TERM CURRENT USE OF INSULIN: Primary | ICD-10-CM

## 2021-11-08 LAB — COMPLEXED PSA SERPL-MCNC: <0.01 NG/ML (ref 0–4)

## 2021-11-08 PROCEDURE — 36415 COLL VENOUS BLD VENIPUNCTURE: CPT | Performed by: UROLOGY

## 2021-11-08 PROCEDURE — 84153 ASSAY OF PSA TOTAL: CPT | Performed by: UROLOGY

## 2021-11-09 ENCOUNTER — OFFICE VISIT (OUTPATIENT)
Dept: UROLOGY | Facility: CLINIC | Age: 56
End: 2021-11-09
Payer: COMMERCIAL

## 2021-11-09 VITALS
WEIGHT: 156.5 LBS | HEART RATE: 97 BPM | HEIGHT: 65 IN | DIASTOLIC BLOOD PRESSURE: 79 MMHG | SYSTOLIC BLOOD PRESSURE: 122 MMHG | BODY MASS INDEX: 26.08 KG/M2

## 2021-11-09 DIAGNOSIS — C61 PROSTATE CANCER: Primary | ICD-10-CM

## 2021-11-09 PROCEDURE — 3078F PR MOST RECENT DIASTOLIC BLOOD PRESSURE < 80 MM HG: ICD-10-PCS | Mod: CPTII,S$GLB,, | Performed by: UROLOGY

## 2021-11-09 PROCEDURE — 3008F BODY MASS INDEX DOCD: CPT | Mod: CPTII,S$GLB,, | Performed by: UROLOGY

## 2021-11-09 PROCEDURE — 99999 PR PBB SHADOW E&M-EST. PATIENT-LVL III: CPT | Mod: PBBFAC,,, | Performed by: UROLOGY

## 2021-11-09 PROCEDURE — 3008F PR BODY MASS INDEX (BMI) DOCUMENTED: ICD-10-PCS | Mod: CPTII,S$GLB,, | Performed by: UROLOGY

## 2021-11-09 PROCEDURE — 1159F MED LIST DOCD IN RCRD: CPT | Mod: CPTII,S$GLB,, | Performed by: UROLOGY

## 2021-11-09 PROCEDURE — 99999 PR PBB SHADOW E&M-EST. PATIENT-LVL III: ICD-10-PCS | Mod: PBBFAC,,, | Performed by: UROLOGY

## 2021-11-09 PROCEDURE — 99213 OFFICE O/P EST LOW 20 MIN: CPT | Mod: S$GLB,,, | Performed by: UROLOGY

## 2021-11-09 PROCEDURE — 3078F DIAST BP <80 MM HG: CPT | Mod: CPTII,S$GLB,, | Performed by: UROLOGY

## 2021-11-09 PROCEDURE — 99213 PR OFFICE/OUTPT VISIT, EST, LEVL III, 20-29 MIN: ICD-10-PCS | Mod: S$GLB,,, | Performed by: UROLOGY

## 2021-11-09 PROCEDURE — 4010F ACE/ARB THERAPY RXD/TAKEN: CPT | Mod: CPTII,S$GLB,, | Performed by: UROLOGY

## 2021-11-09 PROCEDURE — 1160F RVW MEDS BY RX/DR IN RCRD: CPT | Mod: CPTII,S$GLB,, | Performed by: UROLOGY

## 2021-11-09 PROCEDURE — 3044F HG A1C LEVEL LT 7.0%: CPT | Mod: CPTII,S$GLB,, | Performed by: UROLOGY

## 2021-11-09 PROCEDURE — 3074F SYST BP LT 130 MM HG: CPT | Mod: CPTII,S$GLB,, | Performed by: UROLOGY

## 2021-11-09 PROCEDURE — 4010F PR ACE/ARB THEARPY RXD/TAKEN: ICD-10-PCS | Mod: CPTII,S$GLB,, | Performed by: UROLOGY

## 2021-11-09 PROCEDURE — 1159F PR MEDICATION LIST DOCUMENTED IN MEDICAL RECORD: ICD-10-PCS | Mod: CPTII,S$GLB,, | Performed by: UROLOGY

## 2021-11-09 PROCEDURE — 3044F PR MOST RECENT HEMOGLOBIN A1C LEVEL <7.0%: ICD-10-PCS | Mod: CPTII,S$GLB,, | Performed by: UROLOGY

## 2021-11-09 PROCEDURE — 3074F PR MOST RECENT SYSTOLIC BLOOD PRESSURE < 130 MM HG: ICD-10-PCS | Mod: CPTII,S$GLB,, | Performed by: UROLOGY

## 2021-11-09 PROCEDURE — 1160F PR REVIEW ALL MEDS BY PRESCRIBER/CLIN PHARMACIST DOCUMENTED: ICD-10-PCS | Mod: CPTII,S$GLB,, | Performed by: UROLOGY

## 2021-12-02 ENCOUNTER — TELEPHONE (OUTPATIENT)
Dept: RADIATION ONCOLOGY | Facility: CLINIC | Age: 56
End: 2021-12-02
Payer: COMMERCIAL

## 2021-12-08 ENCOUNTER — PATIENT MESSAGE (OUTPATIENT)
Dept: RADIATION ONCOLOGY | Facility: CLINIC | Age: 56
End: 2021-12-08
Payer: COMMERCIAL

## 2021-12-10 ENCOUNTER — LAB VISIT (OUTPATIENT)
Dept: LAB | Facility: HOSPITAL | Age: 56
End: 2021-12-10
Attending: RADIOLOGY
Payer: COMMERCIAL

## 2021-12-10 DIAGNOSIS — Z00.6 EXAMINATION OF PARTICIPANT IN CLINICAL TRIAL: ICD-10-CM

## 2021-12-10 DIAGNOSIS — C61 PROSTATE CANCER: ICD-10-CM

## 2021-12-10 LAB
ALBUMIN SERPL BCP-MCNC: 4 G/DL (ref 3.5–5.2)
ALP SERPL-CCNC: 90 U/L (ref 55–135)
ALT SERPL W/O P-5'-P-CCNC: 25 U/L (ref 10–44)
ANION GAP SERPL CALC-SCNC: 19 MMOL/L (ref 8–16)
AST SERPL-CCNC: 23 U/L (ref 10–40)
BASOPHILS # BLD AUTO: 0.02 K/UL (ref 0–0.2)
BASOPHILS NFR BLD: 0.6 % (ref 0–1.9)
BILIRUB SERPL-MCNC: 0.3 MG/DL (ref 0.1–1)
BUN SERPL-MCNC: 13 MG/DL (ref 6–20)
CALCIUM SERPL-MCNC: 9.1 MG/DL (ref 8.7–10.5)
CHLORIDE SERPL-SCNC: 103 MMOL/L (ref 95–110)
CO2 SERPL-SCNC: 23 MMOL/L (ref 23–29)
COMPLEXED PSA SERPL-MCNC: <0.01 NG/ML (ref 0–4)
COMPLEXED PSA SERPL-MCNC: <0.01 NG/ML (ref 0–4)
CREAT SERPL-MCNC: 0.9 MG/DL (ref 0.5–1.4)
DIFFERENTIAL METHOD: ABNORMAL
EOSINOPHIL # BLD AUTO: 0 K/UL (ref 0–0.5)
EOSINOPHIL NFR BLD: 1.3 % (ref 0–8)
ERYTHROCYTE [DISTWIDTH] IN BLOOD BY AUTOMATED COUNT: 13.4 % (ref 11.5–14.5)
EST. GFR  (AFRICAN AMERICAN): >60 ML/MIN/1.73 M^2
EST. GFR  (NON AFRICAN AMERICAN): >60 ML/MIN/1.73 M^2
GLUCOSE SERPL-MCNC: 88 MG/DL (ref 70–110)
HCT VFR BLD AUTO: 38.6 % (ref 40–54)
HGB BLD-MCNC: 13 G/DL (ref 14–18)
IMM GRANULOCYTES # BLD AUTO: 0.01 K/UL (ref 0–0.04)
IMM GRANULOCYTES NFR BLD AUTO: 0.3 % (ref 0–0.5)
LYMPHOCYTES # BLD AUTO: 0.8 K/UL (ref 1–4.8)
LYMPHOCYTES NFR BLD: 26.5 % (ref 18–48)
MCH RBC QN AUTO: 30.7 PG (ref 27–31)
MCHC RBC AUTO-ENTMCNC: 33.7 G/DL (ref 32–36)
MCV RBC AUTO: 91 FL (ref 82–98)
MONOCYTES # BLD AUTO: 0.3 K/UL (ref 0.3–1)
MONOCYTES NFR BLD: 10.6 % (ref 4–15)
NEUTROPHILS # BLD AUTO: 1.9 K/UL (ref 1.8–7.7)
NEUTROPHILS NFR BLD: 60.7 % (ref 38–73)
NRBC BLD-RTO: 0 /100 WBC
PLATELET # BLD AUTO: 209 K/UL (ref 150–450)
PMV BLD AUTO: 10.2 FL (ref 9.2–12.9)
POTASSIUM SERPL-SCNC: 4.4 MMOL/L (ref 3.5–5.1)
PROT SERPL-MCNC: 7 G/DL (ref 6–8.4)
RBC # BLD AUTO: 4.24 M/UL (ref 4.6–6.2)
SODIUM SERPL-SCNC: 145 MMOL/L (ref 136–145)
TESTOST SERPL-MCNC: 482 NG/DL (ref 304–1227)
WBC # BLD AUTO: 3.1 K/UL (ref 3.9–12.7)

## 2021-12-10 PROCEDURE — 36415 COLL VENOUS BLD VENIPUNCTURE: CPT | Performed by: RADIOLOGY

## 2021-12-10 PROCEDURE — 84153 ASSAY OF PSA TOTAL: CPT | Performed by: RADIOLOGY

## 2021-12-10 PROCEDURE — 84403 ASSAY OF TOTAL TESTOSTERONE: CPT | Performed by: RADIOLOGY

## 2021-12-10 PROCEDURE — 80053 COMPREHEN METABOLIC PANEL: CPT | Performed by: RADIOLOGY

## 2021-12-10 PROCEDURE — 85025 COMPLETE CBC W/AUTO DIFF WBC: CPT | Performed by: RADIOLOGY

## 2021-12-14 ENCOUNTER — OFFICE VISIT (OUTPATIENT)
Dept: RADIATION ONCOLOGY | Facility: CLINIC | Age: 56
End: 2021-12-14
Payer: COMMERCIAL

## 2021-12-14 VITALS
HEART RATE: 87 BPM | RESPIRATION RATE: 16 BRPM | HEIGHT: 65 IN | DIASTOLIC BLOOD PRESSURE: 89 MMHG | BODY MASS INDEX: 26.05 KG/M2 | SYSTOLIC BLOOD PRESSURE: 130 MMHG

## 2021-12-14 DIAGNOSIS — C61 PROSTATE CANCER: Primary | ICD-10-CM

## 2021-12-14 PROCEDURE — 99999 PR PBB SHADOW E&M-EST. PATIENT-LVL III: CPT | Mod: PBBFAC,,, | Performed by: RADIOLOGY

## 2021-12-14 PROCEDURE — 99212 OFFICE O/P EST SF 10 MIN: CPT | Mod: S$GLB,,, | Performed by: RADIOLOGY

## 2021-12-14 PROCEDURE — 4010F PR ACE/ARB THEARPY RXD/TAKEN: ICD-10-PCS | Mod: CPTII,S$GLB,, | Performed by: RADIOLOGY

## 2021-12-14 PROCEDURE — 99212 PR OFFICE/OUTPT VISIT, EST, LEVL II, 10-19 MIN: ICD-10-PCS | Mod: S$GLB,,, | Performed by: RADIOLOGY

## 2021-12-14 PROCEDURE — 4010F ACE/ARB THERAPY RXD/TAKEN: CPT | Mod: CPTII,S$GLB,, | Performed by: RADIOLOGY

## 2021-12-14 PROCEDURE — 99999 PR PBB SHADOW E&M-EST. PATIENT-LVL III: ICD-10-PCS | Mod: PBBFAC,,, | Performed by: RADIOLOGY

## 2021-12-14 RX ORDER — SILDENAFIL 100 MG/1
100 TABLET, FILM COATED ORAL DAILY PRN
Qty: 6 TABLET | Refills: 6 | Status: SHIPPED | OUTPATIENT
Start: 2021-12-14 | End: 2022-03-15

## 2022-01-25 ENCOUNTER — PATIENT MESSAGE (OUTPATIENT)
Dept: ADMINISTRATIVE | Facility: HOSPITAL | Age: 57
End: 2022-01-25
Payer: COMMERCIAL

## 2022-02-07 ENCOUNTER — LAB VISIT (OUTPATIENT)
Dept: LAB | Facility: HOSPITAL | Age: 57
End: 2022-02-07
Payer: COMMERCIAL

## 2022-02-07 DIAGNOSIS — E11.9 TYPE 2 DIABETES MELLITUS WITHOUT COMPLICATION, WITHOUT LONG-TERM CURRENT USE OF INSULIN: ICD-10-CM

## 2022-02-07 LAB
ALBUMIN SERPL BCP-MCNC: 3.7 G/DL (ref 3.5–5.2)
ALP SERPL-CCNC: 76 U/L (ref 55–135)
ALT SERPL W/O P-5'-P-CCNC: 24 U/L (ref 10–44)
ANION GAP SERPL CALC-SCNC: 7 MMOL/L (ref 8–16)
AST SERPL-CCNC: 29 U/L (ref 10–40)
BILIRUB SERPL-MCNC: 0.4 MG/DL (ref 0.1–1)
BUN SERPL-MCNC: 13 MG/DL (ref 6–20)
CALCIUM SERPL-MCNC: 9.2 MG/DL (ref 8.7–10.5)
CHLORIDE SERPL-SCNC: 105 MMOL/L (ref 95–110)
CO2 SERPL-SCNC: 26 MMOL/L (ref 23–29)
CREAT SERPL-MCNC: 1 MG/DL (ref 0.5–1.4)
EST. GFR  (AFRICAN AMERICAN): >60 ML/MIN/1.73 M^2
EST. GFR  (NON AFRICAN AMERICAN): >60 ML/MIN/1.73 M^2
ESTIMATED AVG GLUCOSE: 111 MG/DL (ref 68–131)
GLUCOSE SERPL-MCNC: 87 MG/DL (ref 70–110)
HBA1C MFR BLD: 5.5 % (ref 4–5.6)
POTASSIUM SERPL-SCNC: 3.8 MMOL/L (ref 3.5–5.1)
PROT SERPL-MCNC: 7.1 G/DL (ref 6–8.4)
SODIUM SERPL-SCNC: 138 MMOL/L (ref 136–145)

## 2022-02-07 PROCEDURE — 83036 HEMOGLOBIN GLYCOSYLATED A1C: CPT | Performed by: HOSPITALIST

## 2022-02-07 PROCEDURE — 36415 COLL VENOUS BLD VENIPUNCTURE: CPT | Mod: PO | Performed by: HOSPITALIST

## 2022-02-07 PROCEDURE — 80053 COMPREHEN METABOLIC PANEL: CPT | Performed by: HOSPITALIST

## 2022-03-08 ENCOUNTER — LAB VISIT (OUTPATIENT)
Dept: LAB | Facility: HOSPITAL | Age: 57
End: 2022-03-08
Attending: RADIOLOGY
Payer: COMMERCIAL

## 2022-03-08 DIAGNOSIS — C61 PROSTATE CANCER: ICD-10-CM

## 2022-03-08 DIAGNOSIS — Z00.6 EXAMINATION OF PARTICIPANT IN CLINICAL TRIAL: ICD-10-CM

## 2022-03-08 LAB
ALBUMIN SERPL BCP-MCNC: 4 G/DL (ref 3.5–5.2)
ALP SERPL-CCNC: 93 U/L (ref 55–135)
ALT SERPL W/O P-5'-P-CCNC: 28 U/L (ref 10–44)
ANION GAP SERPL CALC-SCNC: 9 MMOL/L (ref 8–16)
AST SERPL-CCNC: 25 U/L (ref 10–40)
BILIRUB SERPL-MCNC: 0.3 MG/DL (ref 0.1–1)
BUN SERPL-MCNC: 14 MG/DL (ref 6–20)
CALCIUM SERPL-MCNC: 9.1 MG/DL (ref 8.7–10.5)
CHLORIDE SERPL-SCNC: 106 MMOL/L (ref 95–110)
CO2 SERPL-SCNC: 26 MMOL/L (ref 23–29)
COMPLEXED PSA SERPL-MCNC: <0.01 NG/ML (ref 0–4)
CREAT SERPL-MCNC: 1 MG/DL (ref 0.5–1.4)
EST. GFR  (AFRICAN AMERICAN): >60 ML/MIN/1.73 M^2
EST. GFR  (NON AFRICAN AMERICAN): >60 ML/MIN/1.73 M^2
GLUCOSE SERPL-MCNC: 98 MG/DL (ref 70–110)
POTASSIUM SERPL-SCNC: 3.9 MMOL/L (ref 3.5–5.1)
PROT SERPL-MCNC: 7.1 G/DL (ref 6–8.4)
SODIUM SERPL-SCNC: 141 MMOL/L (ref 136–145)
TESTOST SERPL-MCNC: 409 NG/DL (ref 304–1227)

## 2022-03-08 PROCEDURE — 84403 ASSAY OF TOTAL TESTOSTERONE: CPT | Performed by: RADIOLOGY

## 2022-03-08 PROCEDURE — 80053 COMPREHEN METABOLIC PANEL: CPT | Performed by: RADIOLOGY

## 2022-03-08 PROCEDURE — 36415 COLL VENOUS BLD VENIPUNCTURE: CPT | Performed by: RADIOLOGY

## 2022-03-08 PROCEDURE — 84153 ASSAY OF PSA TOTAL: CPT | Performed by: RADIOLOGY

## 2022-03-15 ENCOUNTER — OFFICE VISIT (OUTPATIENT)
Dept: RADIATION ONCOLOGY | Facility: CLINIC | Age: 57
End: 2022-03-15
Payer: COMMERCIAL

## 2022-03-15 ENCOUNTER — PATIENT MESSAGE (OUTPATIENT)
Dept: ADMINISTRATIVE | Facility: HOSPITAL | Age: 57
End: 2022-03-15
Payer: COMMERCIAL

## 2022-03-15 VITALS
BODY MASS INDEX: 27.47 KG/M2 | HEART RATE: 73 BPM | TEMPERATURE: 98 F | RESPIRATION RATE: 17 BRPM | HEIGHT: 65 IN | SYSTOLIC BLOOD PRESSURE: 149 MMHG | OXYGEN SATURATION: 98 % | WEIGHT: 164.88 LBS | DIASTOLIC BLOOD PRESSURE: 88 MMHG

## 2022-03-15 DIAGNOSIS — C61 PROSTATE CANCER: Primary | ICD-10-CM

## 2022-03-15 PROCEDURE — 3044F HG A1C LEVEL LT 7.0%: CPT | Mod: CPTII,S$GLB,, | Performed by: RADIOLOGY

## 2022-03-15 PROCEDURE — 99212 OFFICE O/P EST SF 10 MIN: CPT | Mod: S$GLB,,, | Performed by: RADIOLOGY

## 2022-03-15 PROCEDURE — 3077F SYST BP >= 140 MM HG: CPT | Mod: CPTII,S$GLB,, | Performed by: RADIOLOGY

## 2022-03-15 PROCEDURE — 3079F DIAST BP 80-89 MM HG: CPT | Mod: CPTII,S$GLB,, | Performed by: RADIOLOGY

## 2022-03-15 PROCEDURE — 1159F MED LIST DOCD IN RCRD: CPT | Mod: CPTII,S$GLB,, | Performed by: RADIOLOGY

## 2022-03-15 PROCEDURE — 4010F ACE/ARB THERAPY RXD/TAKEN: CPT | Mod: CPTII,S$GLB,, | Performed by: RADIOLOGY

## 2022-03-15 PROCEDURE — 99999 PR PBB SHADOW E&M-EST. PATIENT-LVL IV: CPT | Mod: PBBFAC,,, | Performed by: RADIOLOGY

## 2022-03-15 PROCEDURE — 3008F PR BODY MASS INDEX (BMI) DOCUMENTED: ICD-10-PCS | Mod: CPTII,S$GLB,, | Performed by: RADIOLOGY

## 2022-03-15 PROCEDURE — 3008F BODY MASS INDEX DOCD: CPT | Mod: CPTII,S$GLB,, | Performed by: RADIOLOGY

## 2022-03-15 PROCEDURE — 4010F PR ACE/ARB THEARPY RXD/TAKEN: ICD-10-PCS | Mod: CPTII,S$GLB,, | Performed by: RADIOLOGY

## 2022-03-15 PROCEDURE — 1160F PR REVIEW ALL MEDS BY PRESCRIBER/CLIN PHARMACIST DOCUMENTED: ICD-10-PCS | Mod: CPTII,S$GLB,, | Performed by: RADIOLOGY

## 2022-03-15 PROCEDURE — 99999 PR PBB SHADOW E&M-EST. PATIENT-LVL IV: ICD-10-PCS | Mod: PBBFAC,,, | Performed by: RADIOLOGY

## 2022-03-15 PROCEDURE — 1160F RVW MEDS BY RX/DR IN RCRD: CPT | Mod: CPTII,S$GLB,, | Performed by: RADIOLOGY

## 2022-03-15 PROCEDURE — 99212 PR OFFICE/OUTPT VISIT, EST, LEVL II, 10-19 MIN: ICD-10-PCS | Mod: S$GLB,,, | Performed by: RADIOLOGY

## 2022-03-15 PROCEDURE — 3079F PR MOST RECENT DIASTOLIC BLOOD PRESSURE 80-89 MM HG: ICD-10-PCS | Mod: CPTII,S$GLB,, | Performed by: RADIOLOGY

## 2022-03-15 PROCEDURE — 3044F PR MOST RECENT HEMOGLOBIN A1C LEVEL <7.0%: ICD-10-PCS | Mod: CPTII,S$GLB,, | Performed by: RADIOLOGY

## 2022-03-15 PROCEDURE — 1159F PR MEDICATION LIST DOCUMENTED IN MEDICAL RECORD: ICD-10-PCS | Mod: CPTII,S$GLB,, | Performed by: RADIOLOGY

## 2022-03-15 PROCEDURE — 3077F PR MOST RECENT SYSTOLIC BLOOD PRESSURE >= 140 MM HG: ICD-10-PCS | Mod: CPTII,S$GLB,, | Performed by: RADIOLOGY

## 2022-03-15 NOTE — PROGRESS NOTES
Subjective:       Patient ID: Roberto Edgar is a 57 y.o. male.    Chief Complaint: Follow-up    This patient returns for follow up visit.     Mr. 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  He initially presented after screening PSA in June of 2020 returned at 7 ng/ml.  Biopsies from the Rt. base revealed [place] 8 (4+4) adenocarcinoma involving 15% of specimen.  Cores from the Rt. apex, Rt. mid gland Lt. apex revealed [place] 6 (3+3) adenocarcinoma involving 5% of the specimens.  He underwent RALP with bilateral lymph node dissection on 10/19/20.  Pathology revealed [place] 8 (4+4) adenocarcinoma involving 35% of the prostate, mainly in the Rt. posterior mid gland and base.  There was perineural and lymphovascular invasion.  There was no extraprostatic extension or seminal vesicle invasion. The margins of the prostate were uninvolved with tumor.  There was [place] 7 (4+3) tumor noted in the separate bladder neck specimen.  Tumor extended to the cauterized margin. One Rt. and 1 Lt. pelvic node was negative for tumor involvement.  Postoperative PSA on 11/24/20 returned at 0.74 ng/ml.  Repeat PSA on 1/6/21 returned at 0.95 ng/ml.  The patient underwent Axumin scan on 2/1/21 which was negative for evidence of local or distant recurrent disease.  He was referred for salvage therapy and we enrolled the patient on NRG .  He began hormonal therapy with Casodex and Lupron, he completed radiotherapy to the prostate bed and pelvic nodes on 6/30/21.  Today the patient states he feels well.  No complaints    Review of Systems   Constitutional: Negative for activity change, appetite change, chills and fatigue.   Respiratory: Negative for cough and shortness of breath.    Cardiovascular: Negative for chest pain and palpitations.   Gastrointestinal: Negative for abdominal pain, constipation, diarrhea and fecal incontinence.   Genitourinary: Positive for  erectile dysfunction (using PEP injection ). Negative for bladder incontinence, difficulty urinating, dysuria, frequency, hematuria and urgency.        AUA score is 3 with nocturia 2 times per nigh and 1 for frequency.           Objective:      Physical Exam  Constitutional:       General: He is not in acute distress.     Appearance: Normal appearance.   Pulmonary:      Effort: Pulmonary effort is normal. No respiratory distress.   Abdominal:      General: Abdomen is flat. There is no distension.      Tenderness: There is no abdominal tenderness.   Neurological:      Mental Status: He is alert and oriented to person, place, and time.   Psychiatric:         Mood and Affect: Mood normal.         Judgment: Judgment normal.       PSA - 3/8/22 < 0.01 ng/ml  testosterone 409 ng/ml  Assessment:       Problem List Items Addressed This Visit     Prostate cancer - Primary          Plan:       Doing well  no evidence of tumor recurrence or progression.  Plan follow up in 4 months with PSA and testosterone.

## 2022-03-18 DIAGNOSIS — C61 PROSTATE CANCER: Primary | ICD-10-CM

## 2022-03-22 ENCOUNTER — PATIENT MESSAGE (OUTPATIENT)
Dept: UROLOGY | Facility: CLINIC | Age: 57
End: 2022-03-22
Payer: COMMERCIAL

## 2022-03-22 ENCOUNTER — PATIENT MESSAGE (OUTPATIENT)
Dept: RADIATION ONCOLOGY | Facility: CLINIC | Age: 57
End: 2022-03-22
Payer: COMMERCIAL

## 2022-03-22 ENCOUNTER — PATIENT MESSAGE (OUTPATIENT)
Dept: INTERNAL MEDICINE | Facility: CLINIC | Age: 57
End: 2022-03-22
Payer: COMMERCIAL

## 2022-03-24 ENCOUNTER — PATIENT MESSAGE (OUTPATIENT)
Dept: RADIATION ONCOLOGY | Facility: CLINIC | Age: 57
End: 2022-03-24
Payer: COMMERCIAL

## 2022-04-04 ENCOUNTER — PATIENT OUTREACH (OUTPATIENT)
Dept: ADMINISTRATIVE | Facility: OTHER | Age: 57
End: 2022-04-04
Payer: COMMERCIAL

## 2022-04-06 ENCOUNTER — OFFICE VISIT (OUTPATIENT)
Dept: UROLOGY | Facility: CLINIC | Age: 57
End: 2022-04-06
Payer: COMMERCIAL

## 2022-04-06 VITALS
DIASTOLIC BLOOD PRESSURE: 82 MMHG | BODY MASS INDEX: 27.77 KG/M2 | HEART RATE: 75 BPM | WEIGHT: 166.88 LBS | SYSTOLIC BLOOD PRESSURE: 129 MMHG

## 2022-04-06 DIAGNOSIS — N52.31 ERECTILE DYSFUNCTION FOLLOWING RADICAL PROSTATECTOMY: Primary | ICD-10-CM

## 2022-04-06 DIAGNOSIS — E11.59 HYPERTENSION ASSOCIATED WITH TYPE 2 DIABETES MELLITUS: ICD-10-CM

## 2022-04-06 DIAGNOSIS — E11.8 DM TYPE 2 CAUSING COMPLICATION: ICD-10-CM

## 2022-04-06 DIAGNOSIS — I15.2 HYPERTENSION ASSOCIATED WITH TYPE 2 DIABETES MELLITUS: ICD-10-CM

## 2022-04-06 DIAGNOSIS — C61 PROSTATE CANCER: ICD-10-CM

## 2022-04-06 PROBLEM — E11.9 TYPE 2 DIABETES MELLITUS WITHOUT COMPLICATION, WITHOUT LONG-TERM CURRENT USE OF INSULIN: Status: RESOLVED | Noted: 2021-07-07 | Resolved: 2022-04-06

## 2022-04-06 PROCEDURE — 99999 PR PBB SHADOW E&M-EST. PATIENT-LVL III: CPT | Mod: PBBFAC,,, | Performed by: UROLOGY

## 2022-04-06 PROCEDURE — 3079F PR MOST RECENT DIASTOLIC BLOOD PRESSURE 80-89 MM HG: ICD-10-PCS | Mod: CPTII,S$GLB,, | Performed by: UROLOGY

## 2022-04-06 PROCEDURE — 3008F BODY MASS INDEX DOCD: CPT | Mod: CPTII,S$GLB,, | Performed by: UROLOGY

## 2022-04-06 PROCEDURE — 3074F SYST BP LT 130 MM HG: CPT | Mod: CPTII,S$GLB,, | Performed by: UROLOGY

## 2022-04-06 PROCEDURE — 3044F PR MOST RECENT HEMOGLOBIN A1C LEVEL <7.0%: ICD-10-PCS | Mod: CPTII,S$GLB,, | Performed by: UROLOGY

## 2022-04-06 PROCEDURE — 1159F MED LIST DOCD IN RCRD: CPT | Mod: CPTII,S$GLB,, | Performed by: UROLOGY

## 2022-04-06 PROCEDURE — 3044F HG A1C LEVEL LT 7.0%: CPT | Mod: CPTII,S$GLB,, | Performed by: UROLOGY

## 2022-04-06 PROCEDURE — 1159F PR MEDICATION LIST DOCUMENTED IN MEDICAL RECORD: ICD-10-PCS | Mod: CPTII,S$GLB,, | Performed by: UROLOGY

## 2022-04-06 PROCEDURE — 4010F PR ACE/ARB THEARPY RXD/TAKEN: ICD-10-PCS | Mod: CPTII,S$GLB,, | Performed by: UROLOGY

## 2022-04-06 PROCEDURE — 99999 PR PBB SHADOW E&M-EST. PATIENT-LVL III: ICD-10-PCS | Mod: PBBFAC,,, | Performed by: UROLOGY

## 2022-04-06 PROCEDURE — 99214 PR OFFICE/OUTPT VISIT, EST, LEVL IV, 30-39 MIN: ICD-10-PCS | Mod: S$GLB,,, | Performed by: UROLOGY

## 2022-04-06 PROCEDURE — 4010F ACE/ARB THERAPY RXD/TAKEN: CPT | Mod: CPTII,S$GLB,, | Performed by: UROLOGY

## 2022-04-06 PROCEDURE — 3074F PR MOST RECENT SYSTOLIC BLOOD PRESSURE < 130 MM HG: ICD-10-PCS | Mod: CPTII,S$GLB,, | Performed by: UROLOGY

## 2022-04-06 PROCEDURE — 3079F DIAST BP 80-89 MM HG: CPT | Mod: CPTII,S$GLB,, | Performed by: UROLOGY

## 2022-04-06 PROCEDURE — 99214 OFFICE O/P EST MOD 30 MIN: CPT | Mod: S$GLB,,, | Performed by: UROLOGY

## 2022-04-06 PROCEDURE — 3008F PR BODY MASS INDEX (BMI) DOCUMENTED: ICD-10-PCS | Mod: CPTII,S$GLB,, | Performed by: UROLOGY

## 2022-04-06 RX ORDER — PAPAVERINE HYDROCHLORIDE 30 MG/ML
INJECTION INTRAMUSCULAR; INTRAVENOUS
Qty: 10 ML | Refills: 11 | Status: SHIPPED | OUTPATIENT
Start: 2022-04-06 | End: 2023-06-05 | Stop reason: SDUPTHER

## 2022-04-06 RX ORDER — METFORMIN HYDROCHLORIDE 500 MG/1
500 TABLET ORAL DAILY
COMMUNITY
Start: 2022-03-16 | End: 2022-10-10

## 2022-04-06 NOTE — PROGRESS NOTES
Mr. Edgar is a 57 y.o. male presenting with ED.    PRESENTING ILLNESS:    Roberto Edgar is a 57 y.o. male with prostate cancer s/p RALP on 10/19/20 by Dr. Perdue.  Since then, he c/o ED.  Prior to surgery, he did have ED.  He tried and failed Viagra and Cialis pre-op.  He was using 5 units of ICI with good results.    Is s/p  XRT and ADT.  Will finished XRT 6/30/21.  T has returned to normal.    He does not have FUNMI.  He is wearing 0 pads/day.  No hematuria.  No dysuria.  Good FOS.    No bone pain or weight loss.    REVIEW OF SYSTEMS:    Roberto Edgar denies headache, blurred vision, fever, nausea, vomiting, chills, abdominal pain, chest pain, sore throat, bleeding per rectum, cough, SOB, recent loss of consciousness, recent mental status changes, seizures, dizziness, or upper or lower extremity weakness.    JUVE  1. 1  2. 0  3. 0  4. 0  5. 0     PATIENT HISTORY:    Past Medical History:   Diagnosis Date    Erectile dysfunction due to arterial insufficiency 5/24/2018    Hypertension     Prediabetes 5/3/2019    Prostate cancer 8/24/2020    Shoulder pain, bilateral        Family History   Problem Relation Age of Onset    Hypertension Mother     Other Mother         s/p hysterectomy for reason unknown to patient    Diabetes Father     Prostate cancer Father 60        underwent chemo; no known mets    Diabetes Maternal Grandmother     Heart disease Maternal Aunt         cardiomyopathy?    Diabetes Maternal Aunt     Multiple sclerosis Maternal Cousin     Kidney failure Maternal Cousin     Other Maternal Cousin         overweight    Diabetes Paternal Grandmother     Pneumonia Paternal Grandfather     Down syndrome Paternal Cousin     Prostate cancer Paternal Cousin     Prostate cancer Paternal Cousin     Hydrocephalus Brother     Breast cancer Other 50    Prostate cancer Other         stage IV; cause of death    Pancreatic cancer Other     Prostate cancer Paternal Uncle 83        unknown whether  underwent chemo; possible mets    Prostate cancer Paternal Uncle 83        unknown whether underwent chemo; unknown whether had mets    Glaucoma Neg Hx     Macular degeneration Neg Hx     Retinal detachment Neg Hx     Strabismus Neg Hx     Cataracts Neg Hx     Amblyopia Neg Hx     Blindness Neg Hx     Melanoma Neg Hx        Past Surgical History:   Procedure Laterality Date    COLONOSCOPY N/A 8/2/2018    Procedure: COLONOSCOPY;  Surgeon: Saeid Velazquez MD;  Location: [facility] (4TH FLR);  Service: Endoscopy;  Laterality: N/A;    HERNIA REPAIR      ROBOT-ASSISTED LAPAROSCOPIC PELVIC LYMPHADENECTOMY Bilateral 10/19/2020    Procedure: ROBOTIC LYMPHADENECTOMY, PELVIS;  Surgeon: Marshall Perdue MD;  Location: [facility] OR 2ND FLR;  Service: Urology;  Laterality: Bilateral;    ROBOT-ASSISTED LAPAROSCOPIC PROSTATECTOMY USING DA SUSHMA XI N/A 10/19/2020    Procedure: XI ROBOTIC PROSTATECTOMY;  Surgeon: Marshall Perdue MD;  Location: [facility] OR 2ND FLR;  Service: Urology;  Laterality: N/A;  3hrs/ gen with regional    TESTICLE SURGERY Right 02/2006    UMBILICAL HERNIA REPAIR N/A unknown       Social History     Socioeconomic History    Marital status:     Number of children: 1   Occupational History    Occupation: [employer]   Tobacco Use    Smoking status: Never Smoker    Smokeless tobacco: Never Used   Substance and Sexual Activity    Alcohol use: Yes     Comment: once in a while    Drug use: No    Sexual activity: Not Currently       Review of patient's allergies indicates:  No Known Allergies      Current Outpatient Medications:     b complex vitamins capsule, Take 1 capsule by mouth once daily., Disp: , Rfl:     blood sugar diagnostic Strp, Check blood sugars two times daily, Disp: 180 strip, Rfl: 3    hydrocortisone 2.5 % cream, Apply topically 2 (two) times daily., Disp: 20 g, Rfl: 2    lancets (LANCETS,THIN) 28 gauge Misc, 1 lancet by Misc.(Non-Drug; Combo Route) route 2 (two) times  daily., Disp: 180 each, Rfl: 3    losartan (COZAAR) 100 MG tablet, Take 1 tablet by mouth once daily, Disp: 90 tablet, Rfl: 3    multivitamin (THERAGRAN) per tablet, Take 1 tablet by mouth once daily., Disp: , Rfl:     omega-3 fatty acids/fish oil (FISH OIL-OMEGA-3 FATTY ACIDS) 300-1,000 mg capsule, Take 1 capsule by mouth once daily., Disp: , Rfl:     papaverine 30 mg/mL injection, Add: Phentolamine 10 mg Add: PGE1 1mcg Sig:  Inject 5 units as directed, Disp: 10 mL, Rfl: 11      PHYSICAL EXAMINATION:    The patient generally appears in good health, is appropriately interactive, and is in no apparent distress.     Eyes: anicteric sclerae, moist conjunctivae; no lid-lag; PERRLA     HENT: Atraumatic; oropharynx clear with moist mucous membranes and no mucosal ulcerations;normal hard and soft palate. No evidence of lymphadenopathy.    Neck: Trachea midline.  No thyromegaly.    Musculoskeletal: No abnormal gait.    Skin: No lesions.    Mental: Cooperative with normal affect.  Is oriented to time, place, and person.    Neuro: Grossly intact.    Chest: Normal inspiratory effort.   No accessory muscles.  No audible wheezes.  Respirations symmetric on inspiration and expiration.    Heart: Regular rhythm.      Abdomen:  Soft, non-tender. No masses or organomegaly. Bladder is not palpable. No evidence of flank discomfort. No evidence of inguinal hernia.    Genitourinary: The penis is circumcised with no evidence of plaques or induration. The urethral meatus is normal. The testes, epididymides, and cord structures are normal in size and contour bilaterally. The scrotum is normal in size and contour.    Extremities: No clubbing, cyanosis, or edema        LABS:      Lab Results   Component Value Date    PSA 7.0 (H) 06/09/2020    PSA 3.7 05/02/2019    PSA 2.4 10/27/2017    PSADIAG <0.01 03/08/2022    PSADIAG <0.01 12/10/2021    PSADIAG <0.01 12/10/2021        IMPRESSION:    Encounter Diagnoses   Name Primary?    Erectile  dysfunction following radical prostatectomy Yes    Prostate cancer     DM type 2 causing complication     Hypertension associated with type 2 diabetes mellitus    HTN, stable  DM, stable    PLAN:    1. Discussed options for his ED (affected by above comorbidities).   He'd like to continue with the ICI for now.  Refilled today.  He currently doesn't have much of a libido, so the ED isn't his primary concern.  2. RTC prn  3. Continue to see Dr. Perdue for his PCa.    Copy to:

## 2022-04-07 ENCOUNTER — OFFICE VISIT (OUTPATIENT)
Dept: INTERNAL MEDICINE | Facility: CLINIC | Age: 57
End: 2022-04-07
Payer: COMMERCIAL

## 2022-04-07 ENCOUNTER — HOSPITAL ENCOUNTER (OUTPATIENT)
Dept: RADIOLOGY | Facility: HOSPITAL | Age: 57
Discharge: HOME | End: 2022-04-07
Attending: HOSPITALIST
Payer: COMMERCIAL

## 2022-04-07 VITALS
OXYGEN SATURATION: 98 % | SYSTOLIC BLOOD PRESSURE: 130 MMHG | HEIGHT: 65 IN | RESPIRATION RATE: 18 BRPM | DIASTOLIC BLOOD PRESSURE: 88 MMHG | BODY MASS INDEX: 27.51 KG/M2 | WEIGHT: 165.13 LBS | TEMPERATURE: 98 F | HEART RATE: 78 BPM

## 2022-04-07 DIAGNOSIS — R20.2 NUMBNESS AND TINGLING IN LEFT ARM: ICD-10-CM

## 2022-04-07 DIAGNOSIS — R20.0 NUMBNESS AND TINGLING IN LEFT ARM: ICD-10-CM

## 2022-04-07 DIAGNOSIS — M25.512 CHRONIC PAIN OF BOTH SHOULDERS: ICD-10-CM

## 2022-04-07 DIAGNOSIS — M25.511 CHRONIC PAIN OF BOTH SHOULDERS: ICD-10-CM

## 2022-04-07 DIAGNOSIS — G89.29 CHRONIC PAIN OF BOTH SHOULDERS: ICD-10-CM

## 2022-04-07 DIAGNOSIS — E11.59 HYPERTENSION ASSOCIATED WITH TYPE 2 DIABETES MELLITUS: ICD-10-CM

## 2022-04-07 DIAGNOSIS — E11.9 TYPE 2 DIABETES MELLITUS WITHOUT COMPLICATION, WITHOUT LONG-TERM CURRENT USE OF INSULIN: Primary | ICD-10-CM

## 2022-04-07 DIAGNOSIS — I15.2 HYPERTENSION ASSOCIATED WITH TYPE 2 DIABETES MELLITUS: ICD-10-CM

## 2022-04-07 PROCEDURE — 1160F RVW MEDS BY RX/DR IN RCRD: CPT | Mod: CPTII,S$GLB,, | Performed by: HOSPITALIST

## 2022-04-07 PROCEDURE — 73030 X-RAY EXAM OF SHOULDER: CPT | Mod: TC,50,PO

## 2022-04-07 PROCEDURE — 3044F PR MOST RECENT HEMOGLOBIN A1C LEVEL <7.0%: ICD-10-PCS | Mod: CPTII,S$GLB,, | Performed by: HOSPITALIST

## 2022-04-07 PROCEDURE — 3044F HG A1C LEVEL LT 7.0%: CPT | Mod: CPTII,S$GLB,, | Performed by: HOSPITALIST

## 2022-04-07 PROCEDURE — 1159F PR MEDICATION LIST DOCUMENTED IN MEDICAL RECORD: ICD-10-PCS | Mod: CPTII,S$GLB,, | Performed by: HOSPITALIST

## 2022-04-07 PROCEDURE — 99999 PR PBB SHADOW E&M-EST. PATIENT-LVL V: CPT | Mod: PBBFAC,,, | Performed by: HOSPITALIST

## 2022-04-07 PROCEDURE — 3008F BODY MASS INDEX DOCD: CPT | Mod: CPTII,S$GLB,, | Performed by: HOSPITALIST

## 2022-04-07 PROCEDURE — 73030 XR SHOULDER TRAUMA 3 VIEW BILATERAL: ICD-10-PCS | Mod: 26,,, | Performed by: RADIOLOGY

## 2022-04-07 PROCEDURE — 3075F SYST BP GE 130 - 139MM HG: CPT | Mod: CPTII,S$GLB,, | Performed by: HOSPITALIST

## 2022-04-07 PROCEDURE — 4010F ACE/ARB THERAPY RXD/TAKEN: CPT | Mod: CPTII,S$GLB,, | Performed by: HOSPITALIST

## 2022-04-07 PROCEDURE — 99215 PR OFFICE/OUTPT VISIT, EST, LEVL V, 40-54 MIN: ICD-10-PCS | Mod: S$GLB,,, | Performed by: HOSPITALIST

## 2022-04-07 PROCEDURE — 73030 X-RAY EXAM OF SHOULDER: CPT | Mod: 26,,, | Performed by: RADIOLOGY

## 2022-04-07 PROCEDURE — 99999 PR PBB SHADOW E&M-EST. PATIENT-LVL V: ICD-10-PCS | Mod: PBBFAC,,, | Performed by: HOSPITALIST

## 2022-04-07 PROCEDURE — 1160F PR REVIEW ALL MEDS BY PRESCRIBER/CLIN PHARMACIST DOCUMENTED: ICD-10-PCS | Mod: CPTII,S$GLB,, | Performed by: HOSPITALIST

## 2022-04-07 PROCEDURE — 3079F DIAST BP 80-89 MM HG: CPT | Mod: CPTII,S$GLB,, | Performed by: HOSPITALIST

## 2022-04-07 PROCEDURE — 3075F PR MOST RECENT SYSTOLIC BLOOD PRESS GE 130-139MM HG: ICD-10-PCS | Mod: CPTII,S$GLB,, | Performed by: HOSPITALIST

## 2022-04-07 PROCEDURE — 72050 X-RAY EXAM NECK SPINE 4/5VWS: CPT | Mod: 26,,, | Performed by: RADIOLOGY

## 2022-04-07 PROCEDURE — 1159F MED LIST DOCD IN RCRD: CPT | Mod: CPTII,S$GLB,, | Performed by: HOSPITALIST

## 2022-04-07 PROCEDURE — 3079F PR MOST RECENT DIASTOLIC BLOOD PRESSURE 80-89 MM HG: ICD-10-PCS | Mod: CPTII,S$GLB,, | Performed by: HOSPITALIST

## 2022-04-07 PROCEDURE — 72050 XR CERVICAL SPINE COMPLETE 5 VIEW: ICD-10-PCS | Mod: 26,,, | Performed by: RADIOLOGY

## 2022-04-07 PROCEDURE — 72050 X-RAY EXAM NECK SPINE 4/5VWS: CPT | Mod: TC,PO

## 2022-04-07 PROCEDURE — 99215 OFFICE O/P EST HI 40 MIN: CPT | Mod: S$GLB,,, | Performed by: HOSPITALIST

## 2022-04-07 PROCEDURE — 4010F PR ACE/ARB THEARPY RXD/TAKEN: ICD-10-PCS | Mod: CPTII,S$GLB,, | Performed by: HOSPITALIST

## 2022-04-07 PROCEDURE — 3008F PR BODY MASS INDEX (BMI) DOCUMENTED: ICD-10-PCS | Mod: CPTII,S$GLB,, | Performed by: HOSPITALIST

## 2022-04-07 RX ORDER — MELOXICAM 15 MG/1
15 TABLET ORAL DAILY
Qty: 30 TABLET | Refills: 1 | Status: SHIPPED | OUTPATIENT
Start: 2022-04-07 | End: 2022-06-03

## 2022-04-07 NOTE — PROGRESS NOTES
Subjective:     @Patient ID: Roberto Edgar is a 57 y.o. male.    Chief Complaint: Follow-up (6 month f/u) and Arm Pain (Lt arm pain radiating from shoulder to wrist pt states this has been going on for months)    HPI     56 yo M with pmhx of HTN, DM2, prostate cancer s/p prostatetectomy presents for f/u    1. HTN: On losartan daily  2. DM2: has not started metformin.  BG on AVG 80-110s. Stays active by mowing the lawn. Has been working a lot extra shifts. Has upcoming eye exam   3. Prostate cancer: s/p prostatectomy and lymph node dissection on 10/19/2020, treated after with hormonal therapy with Casodex and Lupron.  Completed radiotherapy to the prostate bed and pelvic nodes on 6/30/21.  Continues to follow with rad onc and urology      4. Reports having bilateral shoulder pain. L > R. State he injured it when in the . Reports having numbness & pain in the shoulder. . Reports pain of forearm when makes a fist.  Reports unable to squeeze trigger when he went to the gun range this past week. Sometimes ibuprofen helps but not currently  Has seen an orthopedic in [place]. [facility] [id number],   Has filed a claim with the [employer].         Review of Systems   Constitutional: Negative for chills and fever.   HENT: Negative for congestion and sore throat.    Eyes: Negative for pain and visual disturbance.   Respiratory: Negative for cough and shortness of breath.    Cardiovascular: Negative for chest pain and leg swelling.   Gastrointestinal: Negative for abdominal pain, nausea and vomiting.   Endocrine: Negative for polydipsia and polyuria.   Genitourinary: Negative for difficulty urinating and dysuria.   Musculoskeletal: Positive for arthralgias. Negative for back pain.   Skin: Negative for rash and wound.   Neurological: Negative for weakness and headaches.   Psychiatric/Behavioral: Negative for agitation and confusion.     Past medical history, surgical history, and family medical  history reviewed and updated as appropriate.    Medications and allergies reviewed.     Objective:     There were no vitals filed for this visit.  There is no height or weight on file to calculate BMI.  Physical Exam  Vitals reviewed.   Constitutional:       General: He is not in acute distress.     Appearance: He is well-developed.   HENT:      Head: Normocephalic and atraumatic.      Mouth/Throat:      Mouth: Mucous membranes are moist.      Pharynx: No oropharyngeal exudate.   Eyes:      General:         Right eye: No discharge.         Left eye: No discharge.      Conjunctiva/sclera: Conjunctivae normal.   Cardiovascular:      Rate and Rhythm: Normal rate and regular rhythm.      Heart sounds: No murmur heard.    No friction rub.   Pulmonary:      Effort: Pulmonary effort is normal.      Breath sounds: Normal breath sounds.   Musculoskeletal:         General: Normal range of motion.      Cervical back: Normal range of motion and neck supple.      Right lower leg: No edema.      Left lower leg: No edema.      Comments: 5/5 upper extremity strength b/l. Normal  strength      Skin:     General: Skin is warm and dry.   Neurological:      Mental Status: He is alert and oriented to person, place, and time.   Psychiatric:         Mood and Affect: Mood normal.         Behavior: Behavior normal.         Lab Results   Component Value Date    WBC 3.10 (L) 12/10/2021    HGB 13.0 (L) 12/10/2021    HCT 38.6 (L) 12/10/2021     12/10/2021    CHOL 151 06/30/2021    TRIG 45 06/30/2021    HDL 37 (L) 06/30/2021    ALT 28 03/08/2022    AST 25 03/08/2022     03/08/2022    K 3.9 03/08/2022     03/08/2022    CREATININE 1.0 03/08/2022    BUN 14 03/08/2022    CO2 26 03/08/2022    TSH 2.479 06/30/2021    PSA 7.0 (H) 06/09/2020    HGBA1C 5.5 02/07/2022       Assessment:     1. Type 2 diabetes mellitus without complication, without long-term current use of insulin    2. Hypertension associated with type 2 diabetes  mellitus    3. Chronic pain of both shoulders    4. Numbness and tingling in left arm      Plan:   Roberto was seen today for follow-up and arm pain.    Diagnoses and all orders for this visit:    Type 2 diabetes mellitus without complication, without long-term current use of insulin  - Currently diet controlled. Will check labs. Has upcoming eye appt   -     Comprehensive Metabolic Panel; Future  -     Lipid Panel; Future  -     Microalbumin/Creatinine Ratio, Urine; Future  -     Hemoglobin A1C; Future    Hypertension associated with type 2 diabetes mellitus  - bp stable. Continue losartan   -     Comprehensive Metabolic Panel; Future    Chronic pain of both shoulders  - Start mobic. Counseled to avoid other nsaids  -     Ambulatory referral/consult to Orthopedics; Future  -     X-Ray Shoulder Trauma 3 View Bilateral; Future    Numbness and tingling in left arm  - Will get o/s records  -     X-Ray Cervical Spine Complete 5 view; Future    Other orders  -     meloxicam (MOBIC) 15 MG tablet; Take 1 tablet (15 mg total) by mouth once daily.      Visit time 40 min. Includes pre-charting, face-to-face encounter, medical decision making and documentation.     RTC 6 months    Coral Rodriguez MD  Internal Medicine    4/7/2022

## 2022-04-09 ENCOUNTER — PATIENT MESSAGE (OUTPATIENT)
Dept: INTERNAL MEDICINE | Facility: CLINIC | Age: 57
End: 2022-04-09
Payer: COMMERCIAL

## 2022-04-09 DIAGNOSIS — M25.511 CHRONIC PAIN OF BOTH SHOULDERS: Primary | ICD-10-CM

## 2022-04-09 DIAGNOSIS — G89.29 CHRONIC PAIN OF BOTH SHOULDERS: Primary | ICD-10-CM

## 2022-04-09 DIAGNOSIS — R20.2 NUMBNESS AND TINGLING IN LEFT ARM: ICD-10-CM

## 2022-04-09 DIAGNOSIS — R20.0 NUMBNESS AND TINGLING IN LEFT ARM: ICD-10-CM

## 2022-04-09 DIAGNOSIS — M25.512 CHRONIC PAIN OF BOTH SHOULDERS: Primary | ICD-10-CM

## 2022-04-09 DIAGNOSIS — M54.12 CERVICAL RADICULOPATHY: ICD-10-CM

## 2022-04-11 PROBLEM — M54.12 CERVICAL RADICULOPATHY: Status: ACTIVE | Noted: 2022-04-11

## 2022-05-10 ENCOUNTER — PATIENT MESSAGE (OUTPATIENT)
Dept: UROLOGY | Facility: CLINIC | Age: 57
End: 2022-05-10
Payer: COMMERCIAL

## 2022-05-10 ENCOUNTER — PATIENT MESSAGE (OUTPATIENT)
Dept: INTERNAL MEDICINE | Facility: CLINIC | Age: 57
End: 2022-05-10
Payer: COMMERCIAL

## 2022-06-03 RX ORDER — MELOXICAM 15 MG/1
TABLET ORAL
Qty: 30 TABLET | Refills: 0 | Status: SHIPPED | OUTPATIENT
Start: 2022-06-03 | End: 2022-06-08

## 2022-06-07 RX ORDER — MELOXICAM 15 MG/1
TABLET ORAL
Qty: 30 TABLET | Refills: 0 | OUTPATIENT
Start: 2022-06-07

## 2022-06-08 RX ORDER — MELOXICAM 15 MG/1
TABLET ORAL
Qty: 30 TABLET | Refills: 0 | Status: SHIPPED | OUTPATIENT
Start: 2022-06-08 | End: 2022-08-15

## 2022-06-22 ENCOUNTER — LAB VISIT (OUTPATIENT)
Dept: LAB | Facility: HOSPITAL | Age: 57
End: 2022-06-22
Attending: RADIOLOGY
Payer: COMMERCIAL

## 2022-06-22 DIAGNOSIS — C61 PROSTATE CANCER: ICD-10-CM

## 2022-06-22 LAB
COMPLEXED PSA SERPL-MCNC: 0.04 NG/ML (ref 0–4)
TESTOST SERPL-MCNC: 395 NG/DL (ref 304–1227)

## 2022-06-22 PROCEDURE — 36415 COLL VENOUS BLD VENIPUNCTURE: CPT | Performed by: RADIOLOGY

## 2022-06-22 PROCEDURE — 84153 ASSAY OF PSA TOTAL: CPT | Performed by: RADIOLOGY

## 2022-06-22 PROCEDURE — 84403 ASSAY OF TOTAL TESTOSTERONE: CPT | Performed by: RADIOLOGY

## 2022-06-29 ENCOUNTER — OFFICE VISIT (OUTPATIENT)
Dept: RADIATION ONCOLOGY | Facility: CLINIC | Age: 57
End: 2022-06-29
Payer: COMMERCIAL

## 2022-06-29 VITALS
DIASTOLIC BLOOD PRESSURE: 84 MMHG | SYSTOLIC BLOOD PRESSURE: 141 MMHG | BODY MASS INDEX: 29.68 KG/M2 | RESPIRATION RATE: 16 BRPM | WEIGHT: 178.13 LBS | HEIGHT: 65 IN | HEART RATE: 73 BPM

## 2022-06-29 DIAGNOSIS — C61 PROSTATE CANCER: Primary | ICD-10-CM

## 2022-06-29 PROCEDURE — 3044F HG A1C LEVEL LT 7.0%: CPT | Mod: CPTII,S$GLB,, | Performed by: RADIOLOGY

## 2022-06-29 PROCEDURE — 4010F ACE/ARB THERAPY RXD/TAKEN: CPT | Mod: CPTII,S$GLB,, | Performed by: RADIOLOGY

## 2022-06-29 PROCEDURE — 99212 OFFICE O/P EST SF 10 MIN: CPT | Mod: S$GLB,,, | Performed by: RADIOLOGY

## 2022-06-29 PROCEDURE — 3077F PR MOST RECENT SYSTOLIC BLOOD PRESSURE >= 140 MM HG: ICD-10-PCS | Mod: CPTII,S$GLB,, | Performed by: RADIOLOGY

## 2022-06-29 PROCEDURE — 3061F PR NEG MICROALBUMINURIA RESULT DOCUMENTED/REVIEW: ICD-10-PCS | Mod: CPTII,S$GLB,, | Performed by: RADIOLOGY

## 2022-06-29 PROCEDURE — 1159F PR MEDICATION LIST DOCUMENTED IN MEDICAL RECORD: ICD-10-PCS | Mod: CPTII,S$GLB,, | Performed by: RADIOLOGY

## 2022-06-29 PROCEDURE — 1160F RVW MEDS BY RX/DR IN RCRD: CPT | Mod: CPTII,S$GLB,, | Performed by: RADIOLOGY

## 2022-06-29 PROCEDURE — 4010F PR ACE/ARB THEARPY RXD/TAKEN: ICD-10-PCS | Mod: CPTII,S$GLB,, | Performed by: RADIOLOGY

## 2022-06-29 PROCEDURE — 1159F MED LIST DOCD IN RCRD: CPT | Mod: CPTII,S$GLB,, | Performed by: RADIOLOGY

## 2022-06-29 PROCEDURE — 3066F NEPHROPATHY DOC TX: CPT | Mod: CPTII,S$GLB,, | Performed by: RADIOLOGY

## 2022-06-29 PROCEDURE — 3061F NEG MICROALBUMINURIA REV: CPT | Mod: CPTII,S$GLB,, | Performed by: RADIOLOGY

## 2022-06-29 PROCEDURE — 1160F PR REVIEW ALL MEDS BY PRESCRIBER/CLIN PHARMACIST DOCUMENTED: ICD-10-PCS | Mod: CPTII,S$GLB,, | Performed by: RADIOLOGY

## 2022-06-29 PROCEDURE — 3077F SYST BP >= 140 MM HG: CPT | Mod: CPTII,S$GLB,, | Performed by: RADIOLOGY

## 2022-06-29 PROCEDURE — 99999 PR PBB SHADOW E&M-EST. PATIENT-LVL IV: CPT | Mod: PBBFAC,,, | Performed by: RADIOLOGY

## 2022-06-29 PROCEDURE — 99999 PR PBB SHADOW E&M-EST. PATIENT-LVL IV: ICD-10-PCS | Mod: PBBFAC,,, | Performed by: RADIOLOGY

## 2022-06-29 PROCEDURE — 3079F PR MOST RECENT DIASTOLIC BLOOD PRESSURE 80-89 MM HG: ICD-10-PCS | Mod: CPTII,S$GLB,, | Performed by: RADIOLOGY

## 2022-06-29 PROCEDURE — 3008F PR BODY MASS INDEX (BMI) DOCUMENTED: ICD-10-PCS | Mod: CPTII,S$GLB,, | Performed by: RADIOLOGY

## 2022-06-29 PROCEDURE — 3008F BODY MASS INDEX DOCD: CPT | Mod: CPTII,S$GLB,, | Performed by: RADIOLOGY

## 2022-06-29 PROCEDURE — 3066F PR DOCUMENTATION OF TREATMENT FOR NEPHROPATHY: ICD-10-PCS | Mod: CPTII,S$GLB,, | Performed by: RADIOLOGY

## 2022-06-29 PROCEDURE — 3044F PR MOST RECENT HEMOGLOBIN A1C LEVEL <7.0%: ICD-10-PCS | Mod: CPTII,S$GLB,, | Performed by: RADIOLOGY

## 2022-06-29 PROCEDURE — 3079F DIAST BP 80-89 MM HG: CPT | Mod: CPTII,S$GLB,, | Performed by: RADIOLOGY

## 2022-06-29 PROCEDURE — 99212 PR OFFICE/OUTPT VISIT, EST, LEVL II, 10-19 MIN: ICD-10-PCS | Mod: S$GLB,,, | Performed by: RADIOLOGY

## 2022-06-29 NOTE — PROGRESS NOTES
Subjective:       Patient ID: Roberto Edgar is a 57 y.o. male.    Chief Complaint: Prostate Cancer (3mo f/u;psa)    This patient presents for follow up visit.     Mr. 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  He initially presented after screening PSA in June of 2020 returned at 7 ng/ml.  Biopsies from the Rt. base revealed [place] 8 (4+4) adenocarcinoma involving 15% of specimen.  Cores from the Rt. apex, Rt. mid gland Lt. apex revealed [place] 6 (3+3) adenocarcinoma involving 5% of the specimens.  He underwent RALP with bilateral lymph node dissection on 10/19/20.  Pathology revealed [place] 8 (4+4) adenocarcinoma involving 35% of the prostate, mainly in the Rt. posterior mid gland and base.  There was perineural and lymphovascular invasion.  There was no extraprostatic extension or seminal vesicle invasion. The margins of the prostate were uninvolved with tumor.  There was [place] 7 (4+3) tumor noted in the separate bladder neck specimen.  Tumor extended to the cauterized margin. One Rt. and 1 Lt. pelvic node was negative for tumor involvement.  Postoperative PSA on 11/24/20 returned at 0.74 ng/ml.  Repeat PSA on 1/6/21 returned at 0.95 ng/ml.  The patient underwent Axumin scan on 2/1/21 which was negative for evidence of local or distant recurrent disease.  He was referred for salvage therapy and we enrolled the patient on NRG .  He began hormonal therapy with Casodex and Lupron, he completed radiotherapy to the prostate bed and pelvic nodes on 6/30/21.  Today the patient states he feels well.  No complaints       Review of Systems   Constitutional: Negative for activity change, chills and fever.   Gastrointestinal: Negative for abdominal pain, constipation, diarrhea and fecal incontinence.   Genitourinary: Negative for bladder incontinence, difficulty urinating, dysuria, frequency and hematuria.         Objective:      Physical  Exam  Constitutional:       General: He is not in acute distress.     Appearance: Normal appearance.   Abdominal:      General: Abdomen is flat. There is no distension.   Neurological:      Mental Status: He is alert and oriented to person, place, and time.   Psychiatric:         Mood and Affect: Mood normal.         Judgment: Judgment normal.       PSA - increased to 0.04 ng/ml.  Testosterone 395  Assessment:       Problem List Items Addressed This Visit     Prostate cancer - Primary          Plan:       Discussed increase in his PSA.  Explained this may represent an early recurrence.  Explained we will continue to follow his PSA and will likely consider PSMA scan if his PSA continues to increase.  Plan follow up in 3 months with PSA.

## 2022-07-29 ENCOUNTER — OFFICE VISIT (OUTPATIENT)
Dept: OPTOMETRY | Facility: CLINIC | Age: 57
End: 2022-07-29
Payer: COMMERCIAL

## 2022-07-29 DIAGNOSIS — H52.203 MYOPIA OF BOTH EYES WITH ASTIGMATISM AND PRESBYOPIA: ICD-10-CM

## 2022-07-29 DIAGNOSIS — H52.4 MYOPIA OF BOTH EYES WITH ASTIGMATISM AND PRESBYOPIA: ICD-10-CM

## 2022-07-29 DIAGNOSIS — H52.13 MYOPIA OF BOTH EYES WITH ASTIGMATISM AND PRESBYOPIA: ICD-10-CM

## 2022-07-29 DIAGNOSIS — E11.9 DIABETES MELLITUS TYPE 2 WITHOUT RETINOPATHY: Primary | ICD-10-CM

## 2022-07-29 PROCEDURE — 3044F PR MOST RECENT HEMOGLOBIN A1C LEVEL <7.0%: ICD-10-PCS | Mod: CPTII,S$GLB,, | Performed by: OPTOMETRIST

## 2022-07-29 PROCEDURE — 3066F PR DOCUMENTATION OF TREATMENT FOR NEPHROPATHY: ICD-10-PCS | Mod: CPTII,S$GLB,, | Performed by: OPTOMETRIST

## 2022-07-29 PROCEDURE — 1159F MED LIST DOCD IN RCRD: CPT | Mod: CPTII,S$GLB,, | Performed by: OPTOMETRIST

## 2022-07-29 PROCEDURE — 92015 PR REFRACTION: ICD-10-PCS | Mod: S$GLB,,, | Performed by: OPTOMETRIST

## 2022-07-29 PROCEDURE — 4010F ACE/ARB THERAPY RXD/TAKEN: CPT | Mod: CPTII,S$GLB,, | Performed by: OPTOMETRIST

## 2022-07-29 PROCEDURE — 99999 PR PBB SHADOW E&M-EST. PATIENT-LVL II: CPT | Mod: PBBFAC,,, | Performed by: OPTOMETRIST

## 2022-07-29 PROCEDURE — 3044F HG A1C LEVEL LT 7.0%: CPT | Mod: CPTII,S$GLB,, | Performed by: OPTOMETRIST

## 2022-07-29 PROCEDURE — 3066F NEPHROPATHY DOC TX: CPT | Mod: CPTII,S$GLB,, | Performed by: OPTOMETRIST

## 2022-07-29 PROCEDURE — 92004 PR EYE EXAM, NEW PATIENT,COMPREHESV: ICD-10-PCS | Mod: S$GLB,,, | Performed by: OPTOMETRIST

## 2022-07-29 PROCEDURE — 1159F PR MEDICATION LIST DOCUMENTED IN MEDICAL RECORD: ICD-10-PCS | Mod: CPTII,S$GLB,, | Performed by: OPTOMETRIST

## 2022-07-29 PROCEDURE — 99999 PR PBB SHADOW E&M-EST. PATIENT-LVL II: ICD-10-PCS | Mod: PBBFAC,,, | Performed by: OPTOMETRIST

## 2022-07-29 PROCEDURE — 92004 COMPRE OPH EXAM NEW PT 1/>: CPT | Mod: S$GLB,,, | Performed by: OPTOMETRIST

## 2022-07-29 PROCEDURE — 4010F PR ACE/ARB THEARPY RXD/TAKEN: ICD-10-PCS | Mod: CPTII,S$GLB,, | Performed by: OPTOMETRIST

## 2022-07-29 PROCEDURE — 3061F PR NEG MICROALBUMINURIA RESULT DOCUMENTED/REVIEW: ICD-10-PCS | Mod: CPTII,S$GLB,, | Performed by: OPTOMETRIST

## 2022-07-29 PROCEDURE — 3061F NEG MICROALBUMINURIA REV: CPT | Mod: CPTII,S$GLB,, | Performed by: OPTOMETRIST

## 2022-07-29 PROCEDURE — 92015 DETERMINE REFRACTIVE STATE: CPT | Mod: S$GLB,,, | Performed by: OPTOMETRIST

## 2022-07-29 NOTE — PROGRESS NOTES
HPI     Annual Diabetic Eye Exam   distance blur, removes sRx at near even though has PALs  NICHELLE 5 yrs Dr Melchor    Hemoglobin A1C       Date                     Value               Ref Range             Status                04/07/2022               6.2 (H)             4.0 - 5.6 %           Final                 02/07/2022               5.5                 4.0 - 5.6 %           Final                 09/30/2021               6.2 (H)             4.0 - 5.6 %           Final              Last edited by Rubens Mckee, OD on 7/29/2022  7:52 AM. (History)            Assessment /Plan     For exam results, see Encounter Report.    Diabetes mellitus type 2 without retinopathy  -No retinopathy noted today.  Continued control with primary care physician and annual comprehensive eye exam.    Myopia of both eyes with astigmatism and presbyopia  Eyeglass Final Rx     Eyeglass Final Rx       Sphere Cylinder Axis Dist VA Add    Right -1.25 +0.50 015 20/20 +2.00    Left -1.50 +0.75 015 20/20 +2.00    Type: PAL    Expiration Date: 7/29/2023                  RTC 1 yr

## 2022-08-15 RX ORDER — MELOXICAM 15 MG/1
TABLET ORAL
Qty: 30 TABLET | Refills: 2 | Status: SHIPPED | OUTPATIENT
Start: 2022-08-15 | End: 2022-12-05

## 2022-08-15 NOTE — TELEPHONE ENCOUNTER
Refill Routing Note   Medication(s) are not appropriate for processing by [facility] for the following reason(s):      - Outside of protocol (non-delegated)    [facility] action(s):  Route          Medication reconciliation completed: No     Appointments  past 12m or future 3m with PCP    Date Provider   Last Visit   4/7/2022 Coral Rodrgiuez MD   Next Visit   10/10/2022 Coral Rodriguez MD   ED visits in past 90 days: 0        Note composed:7:50 PM 08/14/2022

## 2022-09-01 ENCOUNTER — PATIENT OUTREACH (OUTPATIENT)
Dept: ADMINISTRATIVE | Facility: HOSPITAL | Age: 57
End: 2022-09-01
Payer: COMMERCIAL

## 2022-09-01 DIAGNOSIS — E11.9 TYPE 2 DIABETES MELLITUS WITHOUT COMPLICATION, WITHOUT LONG-TERM CURRENT USE OF INSULIN: Primary | ICD-10-CM

## 2022-09-21 ENCOUNTER — LAB VISIT (OUTPATIENT)
Dept: LAB | Facility: HOSPITAL | Age: 57
End: 2022-09-21
Attending: RADIOLOGY
Payer: COMMERCIAL

## 2022-09-21 DIAGNOSIS — C61 PROSTATE CANCER: Primary | ICD-10-CM

## 2022-09-21 DIAGNOSIS — C61 PROSTATE CANCER: ICD-10-CM

## 2022-09-21 DIAGNOSIS — E11.9 TYPE 2 DIABETES MELLITUS WITHOUT COMPLICATION, WITHOUT LONG-TERM CURRENT USE OF INSULIN: ICD-10-CM

## 2022-09-21 LAB
COMPLEXED PSA SERPL-MCNC: 0.1 NG/ML (ref 0–4)
ESTIMATED AVG GLUCOSE: 126 MG/DL (ref 68–131)
HBA1C MFR BLD: 6 % (ref 4–5.6)

## 2022-09-21 PROCEDURE — 83036 HEMOGLOBIN GLYCOSYLATED A1C: CPT | Performed by: HOSPITALIST

## 2022-09-21 PROCEDURE — 84153 ASSAY OF PSA TOTAL: CPT | Performed by: RADIOLOGY

## 2022-09-21 PROCEDURE — 36415 COLL VENOUS BLD VENIPUNCTURE: CPT | Performed by: RADIOLOGY

## 2022-10-03 DIAGNOSIS — C61 PROSTATE CANCER: Primary | ICD-10-CM

## 2022-10-06 ENCOUNTER — HOSPITAL ENCOUNTER (OUTPATIENT)
Dept: RADIOLOGY | Facility: HOSPITAL | Age: 57
Discharge: HOME | End: 2022-10-06
Attending: RADIOLOGY
Payer: COMMERCIAL

## 2022-10-06 DIAGNOSIS — C61 PROSTATE CANCER: ICD-10-CM

## 2022-10-06 PROCEDURE — 74177 CT ABD & PELVIS W/CONTRAST: CPT | Mod: TC

## 2022-10-06 PROCEDURE — 74177 CT ABD & PELVIS W/CONTRAST: CPT | Mod: 26,,, | Performed by: STUDENT IN AN ORGANIZED HEALTH CARE EDUCATION/TRAINING PROGRAM

## 2022-10-06 PROCEDURE — 71260 CT THORAX DX C+: CPT | Mod: TC

## 2022-10-06 PROCEDURE — 74177 CT CHEST ABDOMEN PELVIS WITH CONTRAST (XPD): ICD-10-PCS | Mod: 26,,, | Performed by: STUDENT IN AN ORGANIZED HEALTH CARE EDUCATION/TRAINING PROGRAM

## 2022-10-06 PROCEDURE — 71260 CT THORAX DX C+: CPT | Mod: 26,,, | Performed by: STUDENT IN AN ORGANIZED HEALTH CARE EDUCATION/TRAINING PROGRAM

## 2022-10-06 PROCEDURE — 25500020 PHARM REV CODE 255: Performed by: RADIOLOGY

## 2022-10-06 PROCEDURE — 71260 CT CHEST ABDOMEN PELVIS WITH CONTRAST (XPD): ICD-10-PCS | Mod: 26,,, | Performed by: STUDENT IN AN ORGANIZED HEALTH CARE EDUCATION/TRAINING PROGRAM

## 2022-10-06 RX ADMIN — IOHEXOL 75 ML: 350 INJECTION, SOLUTION INTRAVENOUS at 04:10

## 2022-10-10 ENCOUNTER — OFFICE VISIT (OUTPATIENT)
Dept: INTERNAL MEDICINE | Facility: CLINIC | Age: 57
End: 2022-10-10
Payer: COMMERCIAL

## 2022-10-10 ENCOUNTER — LAB VISIT (OUTPATIENT)
Dept: LAB | Facility: HOSPITAL | Age: 57
End: 2022-10-10
Attending: HOSPITALIST
Payer: COMMERCIAL

## 2022-10-10 VITALS
DIASTOLIC BLOOD PRESSURE: 90 MMHG | HEIGHT: 65 IN | WEIGHT: 169.31 LBS | TEMPERATURE: 97 F | BODY MASS INDEX: 28.21 KG/M2 | SYSTOLIC BLOOD PRESSURE: 130 MMHG | RESPIRATION RATE: 18 BRPM | OXYGEN SATURATION: 98 % | HEART RATE: 78 BPM

## 2022-10-10 DIAGNOSIS — G89.29 CHRONIC PAIN OF BOTH SHOULDERS: ICD-10-CM

## 2022-10-10 DIAGNOSIS — C61 PROSTATE CANCER: Primary | ICD-10-CM

## 2022-10-10 DIAGNOSIS — M54.12 CERVICAL RADICULOPATHY: ICD-10-CM

## 2022-10-10 DIAGNOSIS — E11.59 HYPERTENSION ASSOCIATED WITH TYPE 2 DIABETES MELLITUS: ICD-10-CM

## 2022-10-10 DIAGNOSIS — M25.511 CHRONIC PAIN OF BOTH SHOULDERS: ICD-10-CM

## 2022-10-10 DIAGNOSIS — I15.2 HYPERTENSION ASSOCIATED WITH TYPE 2 DIABETES MELLITUS: ICD-10-CM

## 2022-10-10 DIAGNOSIS — C61 PROSTATE CANCER: ICD-10-CM

## 2022-10-10 DIAGNOSIS — Z00.00 ANNUAL PHYSICAL EXAM: Primary | ICD-10-CM

## 2022-10-10 DIAGNOSIS — M25.512 CHRONIC PAIN OF BOTH SHOULDERS: ICD-10-CM

## 2022-10-10 DIAGNOSIS — Z00.00 ANNUAL PHYSICAL EXAM: ICD-10-CM

## 2022-10-10 DIAGNOSIS — E11.9 TYPE 2 DIABETES MELLITUS WITHOUT COMPLICATION, WITHOUT LONG-TERM CURRENT USE OF INSULIN: ICD-10-CM

## 2022-10-10 LAB
ALBUMIN/CREAT UR: 6.1 UG/MG (ref 0–30)
BILIRUB UR QL STRIP: NEGATIVE
CLARITY UR REFRACT.AUTO: CLEAR
COLOR UR AUTO: YELLOW
CREAT UR-MCNC: 181 MG/DL (ref 23–375)
GLUCOSE UR QL STRIP: NEGATIVE
HGB UR QL STRIP: NEGATIVE
KETONES UR QL STRIP: NEGATIVE
LEUKOCYTE ESTERASE UR QL STRIP: NEGATIVE
MICROALBUMIN UR DL<=1MG/L-MCNC: 11 UG/ML
NITRITE UR QL STRIP: NEGATIVE
PH UR STRIP: 6 [PH] (ref 5–8)
PROT UR QL STRIP: NEGATIVE
SP GR UR STRIP: 1.02 (ref 1–1.03)
URN SPEC COLLECT METH UR: NORMAL

## 2022-10-10 PROCEDURE — 1159F MED LIST DOCD IN RCRD: CPT | Mod: CPTII,S$GLB,, | Performed by: HOSPITALIST

## 2022-10-10 PROCEDURE — 3080F DIAST BP >= 90 MM HG: CPT | Mod: CPTII,S$GLB,, | Performed by: HOSPITALIST

## 2022-10-10 PROCEDURE — 3066F NEPHROPATHY DOC TX: CPT | Mod: CPTII,S$GLB,, | Performed by: HOSPITALIST

## 2022-10-10 PROCEDURE — 3080F PR MOST RECENT DIASTOLIC BLOOD PRESSURE >= 90 MM HG: ICD-10-PCS | Mod: CPTII,S$GLB,, | Performed by: HOSPITALIST

## 2022-10-10 PROCEDURE — 3075F PR MOST RECENT SYSTOLIC BLOOD PRESS GE 130-139MM HG: ICD-10-PCS | Mod: CPTII,S$GLB,, | Performed by: HOSPITALIST

## 2022-10-10 PROCEDURE — 90686 FLU VACCINE (QUAD) GREATER THAN OR EQUAL TO 3YO PRESERVATIVE FREE IM: ICD-10-PCS | Mod: S$GLB,,, | Performed by: HOSPITALIST

## 2022-10-10 PROCEDURE — 3061F PR NEG MICROALBUMINURIA RESULT DOCUMENTED/REVIEW: ICD-10-PCS | Mod: CPTII,S$GLB,, | Performed by: HOSPITALIST

## 2022-10-10 PROCEDURE — 82043 UR ALBUMIN QUANTITATIVE: CPT | Performed by: HOSPITALIST

## 2022-10-10 PROCEDURE — 3061F NEG MICROALBUMINURIA REV: CPT | Mod: CPTII,S$GLB,, | Performed by: HOSPITALIST

## 2022-10-10 PROCEDURE — 99396 PR PREVENTIVE VISIT,EST,40-64: ICD-10-PCS | Mod: 25,S$GLB,, | Performed by: HOSPITALIST

## 2022-10-10 PROCEDURE — 3066F PR DOCUMENTATION OF TREATMENT FOR NEPHROPATHY: ICD-10-PCS | Mod: CPTII,S$GLB,, | Performed by: HOSPITALIST

## 2022-10-10 PROCEDURE — 90471 IMMUNIZATION ADMIN: CPT | Mod: S$GLB,,, | Performed by: HOSPITALIST

## 2022-10-10 PROCEDURE — 3075F SYST BP GE 130 - 139MM HG: CPT | Mod: CPTII,S$GLB,, | Performed by: HOSPITALIST

## 2022-10-10 PROCEDURE — 1160F PR REVIEW ALL MEDS BY PRESCRIBER/CLIN PHARMACIST DOCUMENTED: ICD-10-PCS | Mod: CPTII,S$GLB,, | Performed by: HOSPITALIST

## 2022-10-10 PROCEDURE — 3008F PR BODY MASS INDEX (BMI) DOCUMENTED: ICD-10-PCS | Mod: CPTII,S$GLB,, | Performed by: HOSPITALIST

## 2022-10-10 PROCEDURE — 3044F HG A1C LEVEL LT 7.0%: CPT | Mod: CPTII,S$GLB,, | Performed by: HOSPITALIST

## 2022-10-10 PROCEDURE — 81003 URINALYSIS AUTO W/O SCOPE: CPT | Performed by: HOSPITALIST

## 2022-10-10 PROCEDURE — 3008F BODY MASS INDEX DOCD: CPT | Mod: CPTII,S$GLB,, | Performed by: HOSPITALIST

## 2022-10-10 PROCEDURE — 90686 IIV4 VACC NO PRSV 0.5 ML IM: CPT | Mod: S$GLB,,, | Performed by: HOSPITALIST

## 2022-10-10 PROCEDURE — 4010F ACE/ARB THERAPY RXD/TAKEN: CPT | Mod: CPTII,S$GLB,, | Performed by: HOSPITALIST

## 2022-10-10 PROCEDURE — 99999 PR PBB SHADOW E&M-EST. PATIENT-LVL IV: CPT | Mod: PBBFAC,,, | Performed by: HOSPITALIST

## 2022-10-10 PROCEDURE — 90471 FLU VACCINE (QUAD) GREATER THAN OR EQUAL TO 3YO PRESERVATIVE FREE IM: ICD-10-PCS | Mod: S$GLB,,, | Performed by: HOSPITALIST

## 2022-10-10 PROCEDURE — 1160F RVW MEDS BY RX/DR IN RCRD: CPT | Mod: CPTII,S$GLB,, | Performed by: HOSPITALIST

## 2022-10-10 PROCEDURE — 1159F PR MEDICATION LIST DOCUMENTED IN MEDICAL RECORD: ICD-10-PCS | Mod: CPTII,S$GLB,, | Performed by: HOSPITALIST

## 2022-10-10 PROCEDURE — 99396 PREV VISIT EST AGE 40-64: CPT | Mod: 25,S$GLB,, | Performed by: HOSPITALIST

## 2022-10-10 PROCEDURE — 99999 PR PBB SHADOW E&M-EST. PATIENT-LVL IV: ICD-10-PCS | Mod: PBBFAC,,, | Performed by: HOSPITALIST

## 2022-10-10 PROCEDURE — 4010F PR ACE/ARB THEARPY RXD/TAKEN: ICD-10-PCS | Mod: CPTII,S$GLB,, | Performed by: HOSPITALIST

## 2022-10-10 PROCEDURE — 3044F PR MOST RECENT HEMOGLOBIN A1C LEVEL <7.0%: ICD-10-PCS | Mod: CPTII,S$GLB,, | Performed by: HOSPITALIST

## 2022-10-10 PROCEDURE — 82570 ASSAY OF URINE CREATININE: CPT | Performed by: HOSPITALIST

## 2022-10-10 NOTE — PROGRESS NOTES
Subjective:     @Patient ID: Roberto Edgar is a 57 y.o. male.    Chief Complaint: Annual Exam    HPI   58 yo M with pmhx of HTN, DM2, prostate cancer s/p prostatetectomy presents for f/u     1. HTN: On losartan daily. Reports home machine broke and has not been checking it lately  2. DM2: not taking metformin. Brought glucose log. BG on AVG 80-120s. Stays active. Current on eye exam   3. Prostate cancer: s/p prostatectomy and lymph node dissection on 10/19/2020, treated after with hormonal therapy with Casodex and Lupron.  Completed radiotherapy to the prostate bed and pelvic nodes on 6/30/21. Had CT chest/abd/pel recently and has upcoming appt with rad oncology in 3 days.  Continues to follow with rad onc and urology        Lipid disorders/ASCVD risk (ages >/= 45 or >/= 20 if increased risk ): ordered  DM (>45y yearly or if obese, HTN): A1c ordered   Eye exam:  utd 7/29/22  Colorectal Cancer (normal risk 50-75yr): Colonoscopy done  8/2018  Prostate (per discussion with patient starting at 50y or 45y if high risk): ordered by rad onc       Vaccines:   Influenza (yearly):  due   Tetanus (every 10 yrs - 1st tdap)  utd 6/2020  PPSV23(>66yo or <65 w/ lung dz, smoking, DM) utd   PCV13 (> 65 or <65 w/ immunocompromised) n/a  Zoster (>59yo) n/a   Covid19: due for booster        Exercise: none currently   Diet: Regular     Review of Systems   Constitutional:  Negative for chills and fever.   HENT:  Negative for congestion and sore throat.    Eyes:  Negative for pain and visual disturbance.   Respiratory:  Negative for cough and shortness of breath.    Cardiovascular:  Negative for chest pain and leg swelling.   Gastrointestinal:  Negative for abdominal pain, nausea and vomiting.   Endocrine: Negative for polydipsia and polyuria.   Genitourinary:  Negative for difficulty urinating and dysuria.   Musculoskeletal:  Positive for arthralgias. Negative for back pain.   Skin:  Negative for rash and wound.   Neurological:   Negative for weakness and headaches.   Psychiatric/Behavioral:  Negative for agitation and confusion.    Past medical history, surgical history, and family medical history reviewed and updated as appropriate.    Medications and allergies reviewed.     Objective:     There were no vitals filed for this visit.  There is no height or weight on file to calculate BMI.  Physical Exam  Vitals reviewed.   Constitutional:       General: He is not in acute distress.     Appearance: He is well-developed.   HENT:      Head: Normocephalic and atraumatic.      Right Ear: Tympanic membrane normal.      Left Ear: Tympanic membrane normal.      Mouth/Throat:      Mouth: Mucous membranes are moist.      Pharynx: No oropharyngeal exudate.   Eyes:      General:         Right eye: No discharge.         Left eye: No discharge.      Conjunctiva/sclera: Conjunctivae normal.   Cardiovascular:      Rate and Rhythm: Normal rate and regular rhythm.      Heart sounds: No murmur heard.    No friction rub.   Pulmonary:      Effort: Pulmonary effort is normal.      Breath sounds: Normal breath sounds.   Abdominal:      General: Bowel sounds are normal. There is no distension.      Palpations: Abdomen is soft.      Tenderness: There is no abdominal tenderness.   Musculoskeletal:      Cervical back: Normal range of motion and neck supple.      Right lower leg: No edema.      Left lower leg: No edema.   Lymphadenopathy:      Cervical: No cervical adenopathy.   Skin:     General: Skin is warm and dry.   Neurological:      Mental Status: He is alert and oriented to person, place, and time.   Psychiatric:         Mood and Affect: Mood normal.         Behavior: Behavior normal.       Lab Results   Component Value Date    WBC 3.10 (L) 12/10/2021    HGB 13.0 (L) 12/10/2021    HCT 38.6 (L) 12/10/2021     12/10/2021    CHOL 186 04/07/2022    TRIG 64 04/07/2022    HDL 44 04/07/2022    ALT 29 04/07/2022    AST 26 04/07/2022     04/07/2022    K 4.3  04/07/2022     04/07/2022    CREATININE 1.0 04/07/2022    BUN 15 04/07/2022    CO2 28 04/07/2022    TSH 2.479 06/30/2021    PSA 7.0 (H) 06/09/2020    HGBA1C 6.0 (H) 09/21/2022       Assessment:     1. Annual physical exam    2. Type 2 diabetes mellitus without complication, without long-term current use of insulin    3. Hypertension associated with type 2 diabetes mellitus    4. Prostate cancer    5. Cervical radiculopathy    6. Chronic pain of both shoulders      Plan:   Roberto was seen today for annual exam.    Diagnoses and all orders for this visit:    Annual physical exam  -     Comprehensive Metabolic Panel; Future  -     CBC Auto Differential; Future  -     Lipid Panel; Future  -     TSH; Future  -     Urinalysis; Future  -     Microalbumin/Creatinine Ratio, Urine; Future    Type 2 diabetes mellitus without complication, without long-term current use of insulin  - Stable. Continue diet control    Hypertension associated with type 2 diabetes mellitus  - bp not at goal. Will keep bp log and RTC in 2 weeks    Prostate cancer  - keep f/u with rad onc and urology    Cervical radiculopathy  Chronic pain of both shoulders  - stable. Continue meloxicam     Other orders  -     Influenza - Quadrivalent (PF)        Rtc 2 weeks bp check     Coral Rodriguez MD  Internal Medicine    10/10/2022

## 2022-10-31 ENCOUNTER — OFFICE VISIT (OUTPATIENT)
Dept: INTERNAL MEDICINE | Facility: CLINIC | Age: 57
End: 2022-10-31
Payer: COMMERCIAL

## 2022-10-31 VITALS
DIASTOLIC BLOOD PRESSURE: 88 MMHG | TEMPERATURE: 98 F | OXYGEN SATURATION: 97 % | SYSTOLIC BLOOD PRESSURE: 126 MMHG | HEART RATE: 75 BPM | HEIGHT: 65 IN | BODY MASS INDEX: 28.02 KG/M2 | WEIGHT: 168.19 LBS | RESPIRATION RATE: 16 BRPM

## 2022-10-31 DIAGNOSIS — E11.59 HYPERTENSION ASSOCIATED WITH TYPE 2 DIABETES MELLITUS: Primary | ICD-10-CM

## 2022-10-31 DIAGNOSIS — E11.9 TYPE 2 DIABETES MELLITUS WITHOUT COMPLICATION, WITHOUT LONG-TERM CURRENT USE OF INSULIN: ICD-10-CM

## 2022-10-31 DIAGNOSIS — I15.2 HYPERTENSION ASSOCIATED WITH TYPE 2 DIABETES MELLITUS: Primary | ICD-10-CM

## 2022-10-31 PROCEDURE — 1160F PR REVIEW ALL MEDS BY PRESCRIBER/CLIN PHARMACIST DOCUMENTED: ICD-10-PCS | Mod: CPTII,S$GLB,, | Performed by: HOSPITALIST

## 2022-10-31 PROCEDURE — 3074F PR MOST RECENT SYSTOLIC BLOOD PRESSURE < 130 MM HG: ICD-10-PCS | Mod: CPTII,S$GLB,, | Performed by: HOSPITALIST

## 2022-10-31 PROCEDURE — 4010F PR ACE/ARB THEARPY RXD/TAKEN: ICD-10-PCS | Mod: CPTII,S$GLB,, | Performed by: HOSPITALIST

## 2022-10-31 PROCEDURE — 3066F PR DOCUMENTATION OF TREATMENT FOR NEPHROPATHY: ICD-10-PCS | Mod: CPTII,S$GLB,, | Performed by: HOSPITALIST

## 2022-10-31 PROCEDURE — 3061F NEG MICROALBUMINURIA REV: CPT | Mod: CPTII,S$GLB,, | Performed by: HOSPITALIST

## 2022-10-31 PROCEDURE — 1159F MED LIST DOCD IN RCRD: CPT | Mod: CPTII,S$GLB,, | Performed by: HOSPITALIST

## 2022-10-31 PROCEDURE — 3044F HG A1C LEVEL LT 7.0%: CPT | Mod: CPTII,S$GLB,, | Performed by: HOSPITALIST

## 2022-10-31 PROCEDURE — 99214 PR OFFICE/OUTPT VISIT, EST, LEVL IV, 30-39 MIN: ICD-10-PCS | Mod: S$GLB,,, | Performed by: HOSPITALIST

## 2022-10-31 PROCEDURE — 1160F RVW MEDS BY RX/DR IN RCRD: CPT | Mod: CPTII,S$GLB,, | Performed by: HOSPITALIST

## 2022-10-31 PROCEDURE — 3061F PR NEG MICROALBUMINURIA RESULT DOCUMENTED/REVIEW: ICD-10-PCS | Mod: CPTII,S$GLB,, | Performed by: HOSPITALIST

## 2022-10-31 PROCEDURE — 99214 OFFICE O/P EST MOD 30 MIN: CPT | Mod: S$GLB,,, | Performed by: HOSPITALIST

## 2022-10-31 PROCEDURE — 3044F PR MOST RECENT HEMOGLOBIN A1C LEVEL <7.0%: ICD-10-PCS | Mod: CPTII,S$GLB,, | Performed by: HOSPITALIST

## 2022-10-31 PROCEDURE — 99999 PR PBB SHADOW E&M-EST. PATIENT-LVL IV: CPT | Mod: PBBFAC,,, | Performed by: HOSPITALIST

## 2022-10-31 PROCEDURE — 4010F ACE/ARB THERAPY RXD/TAKEN: CPT | Mod: CPTII,S$GLB,, | Performed by: HOSPITALIST

## 2022-10-31 PROCEDURE — 3074F SYST BP LT 130 MM HG: CPT | Mod: CPTII,S$GLB,, | Performed by: HOSPITALIST

## 2022-10-31 PROCEDURE — 3079F DIAST BP 80-89 MM HG: CPT | Mod: CPTII,S$GLB,, | Performed by: HOSPITALIST

## 2022-10-31 PROCEDURE — 1159F PR MEDICATION LIST DOCUMENTED IN MEDICAL RECORD: ICD-10-PCS | Mod: CPTII,S$GLB,, | Performed by: HOSPITALIST

## 2022-10-31 PROCEDURE — 3079F PR MOST RECENT DIASTOLIC BLOOD PRESSURE 80-89 MM HG: ICD-10-PCS | Mod: CPTII,S$GLB,, | Performed by: HOSPITALIST

## 2022-10-31 PROCEDURE — 99999 PR PBB SHADOW E&M-EST. PATIENT-LVL IV: ICD-10-PCS | Mod: PBBFAC,,, | Performed by: HOSPITALIST

## 2022-10-31 PROCEDURE — 3066F NEPHROPATHY DOC TX: CPT | Mod: CPTII,S$GLB,, | Performed by: HOSPITALIST

## 2022-10-31 NOTE — PROGRESS NOTES
"Chief Complaint  Chief Complaint   Patient presents with    Follow-up       HPI  Roberto Edgar is a 57 y.o. male with multiple medical diagnoses as listed in the medical history and problem list that presents for his routine blood pressure check.  Their last appointment with primary care was 10/10/2022.  Today he is feeling well with no complaints. He endorses taking his losartan 100mg daily and working on his diet. He brought his blood pressure home readings for the past month in with him, which were all within normal limits (see "Media" for copy). He also brought in his home glucose monitor which showed steady readings within normal limits over the past week.      PAST MEDICAL HISTORY:  Past Medical History:   Diagnosis Date    Erectile dysfunction due to arterial insufficiency 5/24/2018    Hypertension     Prediabetes 5/3/2019    Prostate cancer 8/24/2020    Shoulder pain, bilateral        PAST SURGICAL HISTORY:  Past Surgical History:   Procedure Laterality Date    COLONOSCOPY N/A 8/2/2018    Procedure: COLONOSCOPY;  Surgeon: Saeid Velazquez MD;  Location: [facility] (4TH FLR);  Service: Endoscopy;  Laterality: N/A;    HERNIA REPAIR      ROBOT-ASSISTED LAPAROSCOPIC PELVIC LYMPHADENECTOMY Bilateral 10/19/2020    Procedure: ROBOTIC LYMPHADENECTOMY, PELVIS;  Surgeon: Marshall Perdue MD;  [facility];  Service: Urology;  Laterality: Bilateral;    ROBOT-ASSISTED LAPAROSCOPIC PROSTATECTOMY USING DA SUSHMA XI N/A 10/19/2020    Procedure: XI ROBOTIC PROSTATECTOMY;  Surgeon: Marshall Perdue MD;  [facility];  Service: Urology;  Laterality: N/A;  3hrs/ gen with regional    TESTICLE SURGERY Right 02/2006    UMBILICAL HERNIA REPAIR N/A unknown       SOCIAL HISTORY:  Social History     Socioeconomic History    Marital status:     Number of children: 1   Occupational History    Occupation:    Tobacco Use    Smoking status: Never    Smokeless tobacco: Never   Substance and " Sexual Activity    Alcohol use: Yes     Comment: once in a while    Drug use: No    Sexual activity: Not Currently       FAMILY HISTORY:  Family History   Problem Relation Age of Onset    Hypertension Mother     Other Mother         s/p hysterectomy for reason unknown to patient    Diabetes Father     Prostate cancer Father 60        underwent chemo; no known mets    Diabetes Maternal Grandmother     Heart disease Maternal Aunt         cardiomyopathy?    Diabetes Maternal Aunt     Multiple sclerosis Maternal Cousin     Kidney failure Maternal Cousin     Other Maternal Cousin         overweight    Diabetes Paternal Grandmother     Pneumonia Paternal Grandfather     Down syndrome Paternal Cousin     Prostate cancer Paternal Cousin     Prostate cancer Paternal Cousin     Hydrocephalus Brother     Breast cancer Other 50    Prostate cancer Other         stage IV; cause of death    Pancreatic cancer Other     Prostate cancer Paternal Uncle 83        unknown whether underwent chemo; possible mets    Prostate cancer Paternal Uncle 83        unknown whether underwent chemo; unknown whether had mets    Glaucoma Neg Hx     Macular degeneration Neg Hx     Retinal detachment Neg Hx     Strabismus Neg Hx     Cataracts Neg Hx     Amblyopia Neg Hx     Blindness Neg Hx     Melanoma Neg Hx        ALLERGIES AND MEDICATIONS: updated and reviewed.  Review of patient's allergies indicates:  No Known Allergies  Current Outpatient Medications   Medication Sig Dispense Refill    b complex vitamins capsule Take 1 capsule by mouth once daily.      blood sugar diagnostic Strp Check blood sugars two times daily 180 strip 3    hydrocortisone 2.5 % cream Apply topically 2 (two) times daily. 20 g 2    lancets (LANCETS,THIN) 28 gauge Misc 1 lancet by Misc.(Non-Drug; Combo Route) route 2 (two) times daily. 180 each 3    losartan (COZAAR) 100 MG tablet Take 1 tablet by mouth once daily 90 tablet 3    meloxicam (MOBIC) 15 MG tablet Take 1 tablet by  "mouth once daily 30 tablet 2    multivitamin (THERAGRAN) per tablet Take 1 tablet by mouth once daily.      omega-3 fatty acids/fish oil (FISH OIL-OMEGA-3 FATTY ACIDS) 300-1,000 mg capsule Take 1 capsule by mouth once daily.      papaverine 30 mg/mL injection Add: Phentolamine 10 mg Add: PGE1 1mcg Sig:  Inject 5 units as directed 10 mL 11     No current facility-administered medications for this visit.         Physical Exam  Vitals:    10/31/22 1056   BP: 126/88   Pulse: 75   Resp: 16   Temp: 97.8 °F (36.6 °C)   SpO2: 97%   Weight: 76.3 kg (168 lb 3.4 oz)   Height: 5' 5" (1.651 m)    Body mass index is 27.99 kg/m².  Weight: 76.3 kg (168 lb 3.4 oz)   Height: 5' 5" (165.1 cm)     Physical Exam  BP readings 123/86 and 126/88 sequentially during clinic visit.    Health Maintenance         Date Due Completion Date    Shingles Vaccine (1 of 2) Never done ---    Low Dose Statin Never done ---    COVID-19 Vaccine (4 - Booster for Pfizer series) 03/26/2022 1/29/2022    Foot Exam 10/07/2022 10/7/2021    Hemoglobin A1c 03/21/2023 9/21/2022    Eye Exam 07/29/2023 7/29/2022    Diabetes Urine Screening 10/10/2023 10/10/2022    Lipid Panel 10/10/2023 10/10/2022    Pneumococcal Vaccines (Age 0-64) (3 - PPSV23 if available, else PCV20) 10/07/2026 10/7/2021    Colorectal Cancer Screening 08/02/2028 8/2/2018    TETANUS VACCINE 06/04/2030 6/4/2020              Assessment and Plan:  Mr. Roberto Edgar is a 56yo male who presented to clinic today for his routine blood pressure check. His pressures were 123/86 and 126/88 during the visit. He is doing well with no complaints, and is keeping up with his medications and diet. We made plans to complete his diabetic foot exam during the next visit. His home BP and glucose readings were within normal limits and discussed with him.    Hypertension associated with type 2 diabetes mellitus  -     Hemoglobin A1C; Future; Expected date: 03/31/2023  -     Comprehensive Metabolic Panel; Future; Expected " date: 03/31/2023  -     Lipid Panel; Future; Expected date: 03/31/2023  -     Microalbumin/Creatinine Ratio, Urine; Future; Expected date: 03/31/2023      Type 2 diabetes mellitus without complication, without long-term current use of insulin  -     Hemoglobin A1C; Future; Expected date: 03/31/2023  -     Comprehensive Metabolic Panel; Future; Expected date: 03/31/2023  -     Lipid Panel; Future; Expected date: 03/31/2023  -     Microalbumin/Creatinine Ratio, Urine; Future; Expected date: 03/31/2023  - diabetic foot exam next visit      Silver Edgar  MS4  [employer]

## 2022-10-31 NOTE — PROGRESS NOTES
Subjective:     @Patient ID: Roberto Edgar is a 57 y.o. male.    Chief Complaint: Follow-up    HPI  56 yo M with pmhx of HTN, DM2, prostate cancer s/p prostatetectomy presents for f/u     1. HTN: On losartan 100 mg qday. Last visit bp elevated. Brought bp log today. Reports bp improved at home.   2. DM2: diet controlled. A1c 6.0.  Stays active. Current on eye exam. Due for foot exam . Brought glucose log and fasting avg in 80-90s in the AM  3. Prostate cancer: s/p prostatectomy and lymph node dissection on 10/19/2020, treated after with hormonal therapy with Casodex and Lupron.  Completed radiotherapy to the prostate bed and pelvic nodes on 6/30/21. Had CT chest/abd/pel with rad oncology. has upcoming PET scan. Continues to follow with rad onc and urology            Review of Systems   Constitutional:  Negative for chills and fever.   HENT:  Negative for congestion and sore throat.    Respiratory:  Negative for cough and shortness of breath.    Cardiovascular:  Negative for chest pain and leg swelling.   Gastrointestinal:  Negative for abdominal pain, nausea and vomiting.   Neurological:  Negative for weakness and headaches.   Psychiatric/Behavioral:  Negative for agitation and confusion.    Past medical history, surgical history, and family medical history reviewed and updated as appropriate.    Medications and allergies reviewed.     Objective:     There were no vitals filed for this visit.  There is no height or weight on file to calculate BMI.  Physical Exam  Constitutional:       Appearance: Normal appearance.   HENT:      Head: Normocephalic and atraumatic.   Eyes:      General:         Right eye: No discharge.         Left eye: No discharge.      Conjunctiva/sclera: Conjunctivae normal.   Pulmonary:      Effort: Pulmonary effort is normal.   Musculoskeletal:         General: Normal range of motion.      Cervical back: Normal range of motion and neck supple.   Skin:     General: Skin is warm and dry.    Neurological:      Mental Status: He is alert and oriented to person, place, and time.   Psychiatric:         Mood and Affect: Mood normal.         Behavior: Behavior normal.       Lab Results   Component Value Date    WBC 3.94 10/10/2022    HGB 14.7 10/10/2022    HCT 44.1 10/10/2022     10/10/2022    CHOL 174 10/10/2022    TRIG 68 10/10/2022    HDL 38 (L) 10/10/2022    ALT 34 10/10/2022    AST 29 10/10/2022     10/10/2022    K 4.2 10/10/2022     10/10/2022    CREATININE 1.0 10/10/2022    BUN 14 10/10/2022    CO2 25 10/10/2022    TSH 4.403 (H) 10/10/2022    PSA 7.0 (H) 06/09/2020    HGBA1C 6.0 (H) 09/21/2022       Assessment:     1. Hypertension associated with type 2 diabetes mellitus    2. Type 2 diabetes mellitus without complication, without long-term current use of insulin      Plan:   Roberto was seen today for follow-up.    Diagnoses and all orders for this visit:    Hypertension associated with type 2 diabetes mellitus  -     Stable. Continue home meds   -     Hemoglobin A1C; Future  -     Comprehensive Metabolic Panel; Future  -     Lipid Panel; Future  -     Microalbumin/Creatinine Ratio, Urine; Future    Type 2 diabetes mellitus without complication, without long-term current use of insulin  -     Stable. Continue home meds   -     Hemoglobin A1C; Future  -     Comprehensive Metabolic Panel; Future  -     Lipid Panel; Future  -     Microalbumin/Creatinine Ratio, Urine; Future    RTC 6 months for f/u and diabetic foot exam    Coral Rodriguez MD  Internal Medicine    10/31/2022

## 2022-11-08 ENCOUNTER — LAB VISIT (OUTPATIENT)
Dept: LAB | Facility: HOSPITAL | Age: 57
End: 2022-11-08
Attending: UROLOGY
Payer: COMMERCIAL

## 2022-11-08 DIAGNOSIS — C61 PROSTATE CANCER: ICD-10-CM

## 2022-11-08 LAB — COMPLEXED PSA SERPL-MCNC: 0.12 NG/ML (ref 0–4)

## 2022-11-08 PROCEDURE — 84153 ASSAY OF PSA TOTAL: CPT | Performed by: UROLOGY

## 2022-11-08 PROCEDURE — 36415 COLL VENOUS BLD VENIPUNCTURE: CPT | Performed by: UROLOGY

## 2022-11-10 ENCOUNTER — OFFICE VISIT (OUTPATIENT)
Dept: UROLOGY | Facility: CLINIC | Age: 57
End: 2022-11-10
Payer: COMMERCIAL

## 2022-11-10 VITALS
HEART RATE: 77 BPM | HEIGHT: 65 IN | DIASTOLIC BLOOD PRESSURE: 90 MMHG | SYSTOLIC BLOOD PRESSURE: 142 MMHG | BODY MASS INDEX: 28.32 KG/M2 | WEIGHT: 170 LBS

## 2022-11-10 DIAGNOSIS — Z92.3 HISTORY OF EXTERNAL BEAM RADIATION THERAPY: ICD-10-CM

## 2022-11-10 DIAGNOSIS — Z90.79 HISTORY OF ROBOT-ASSISTED LAPAROSCOPIC RADICAL PROSTATECTOMY: ICD-10-CM

## 2022-11-10 DIAGNOSIS — R97.21 RISING PSA FOLLOWING TREATMENT FOR MALIGNANT NEOPLASM OF PROSTATE: ICD-10-CM

## 2022-11-10 DIAGNOSIS — N52.31 ERECTILE DYSFUNCTION FOLLOWING RADICAL PROSTATECTOMY: ICD-10-CM

## 2022-11-10 DIAGNOSIS — C61 PROSTATE CANCER: Primary | ICD-10-CM

## 2022-11-10 LAB
BILIRUB SERPL-MCNC: NORMAL MG/DL
BLOOD URINE, POC: NORMAL
CLARITY, POC UA: CLEAR
COLOR, POC UA: YELLOW
GLUCOSE UR QL STRIP: NORMAL
KETONES UR QL STRIP: NORMAL
LEUKOCYTE ESTERASE URINE, POC: NORMAL
NITRITE, POC UA: NORMAL
PH, POC UA: 5
PROTEIN, POC: NORMAL
SPECIFIC GRAVITY, POC UA: 1.02
UROBILINOGEN, POC UA: NORMAL

## 2022-11-10 PROCEDURE — 3061F NEG MICROALBUMINURIA REV: CPT | Mod: CPTII,S$GLB,, | Performed by: NURSE PRACTITIONER

## 2022-11-10 PROCEDURE — 81002 POCT URINE DIPSTICK WITHOUT MICROSCOPE: ICD-10-PCS | Mod: S$GLB,,, | Performed by: NURSE PRACTITIONER

## 2022-11-10 PROCEDURE — 3080F DIAST BP >= 90 MM HG: CPT | Mod: CPTII,S$GLB,, | Performed by: NURSE PRACTITIONER

## 2022-11-10 PROCEDURE — 3044F HG A1C LEVEL LT 7.0%: CPT | Mod: CPTII,S$GLB,, | Performed by: NURSE PRACTITIONER

## 2022-11-10 PROCEDURE — 3066F PR DOCUMENTATION OF TREATMENT FOR NEPHROPATHY: ICD-10-PCS | Mod: CPTII,S$GLB,, | Performed by: NURSE PRACTITIONER

## 2022-11-10 PROCEDURE — 3077F PR MOST RECENT SYSTOLIC BLOOD PRESSURE >= 140 MM HG: ICD-10-PCS | Mod: CPTII,S$GLB,, | Performed by: NURSE PRACTITIONER

## 2022-11-10 PROCEDURE — 1160F PR REVIEW ALL MEDS BY PRESCRIBER/CLIN PHARMACIST DOCUMENTED: ICD-10-PCS | Mod: CPTII,S$GLB,, | Performed by: NURSE PRACTITIONER

## 2022-11-10 PROCEDURE — 3061F PR NEG MICROALBUMINURIA RESULT DOCUMENTED/REVIEW: ICD-10-PCS | Mod: CPTII,S$GLB,, | Performed by: NURSE PRACTITIONER

## 2022-11-10 PROCEDURE — 99214 PR OFFICE/OUTPT VISIT, EST, LEVL IV, 30-39 MIN: ICD-10-PCS | Mod: S$GLB,,, | Performed by: NURSE PRACTITIONER

## 2022-11-10 PROCEDURE — 99214 OFFICE O/P EST MOD 30 MIN: CPT | Mod: S$GLB,,, | Performed by: NURSE PRACTITIONER

## 2022-11-10 PROCEDURE — 1159F PR MEDICATION LIST DOCUMENTED IN MEDICAL RECORD: ICD-10-PCS | Mod: CPTII,S$GLB,, | Performed by: NURSE PRACTITIONER

## 2022-11-10 PROCEDURE — 99999 PR PBB SHADOW E&M-EST. PATIENT-LVL III: ICD-10-PCS | Mod: PBBFAC,,, | Performed by: NURSE PRACTITIONER

## 2022-11-10 PROCEDURE — 3008F PR BODY MASS INDEX (BMI) DOCUMENTED: ICD-10-PCS | Mod: CPTII,S$GLB,, | Performed by: NURSE PRACTITIONER

## 2022-11-10 PROCEDURE — 99999 PR PBB SHADOW E&M-EST. PATIENT-LVL III: CPT | Mod: PBBFAC,,, | Performed by: NURSE PRACTITIONER

## 2022-11-10 PROCEDURE — 4010F ACE/ARB THERAPY RXD/TAKEN: CPT | Mod: CPTII,S$GLB,, | Performed by: NURSE PRACTITIONER

## 2022-11-10 PROCEDURE — 3080F PR MOST RECENT DIASTOLIC BLOOD PRESSURE >= 90 MM HG: ICD-10-PCS | Mod: CPTII,S$GLB,, | Performed by: NURSE PRACTITIONER

## 2022-11-10 PROCEDURE — 3077F SYST BP >= 140 MM HG: CPT | Mod: CPTII,S$GLB,, | Performed by: NURSE PRACTITIONER

## 2022-11-10 PROCEDURE — 3044F PR MOST RECENT HEMOGLOBIN A1C LEVEL <7.0%: ICD-10-PCS | Mod: CPTII,S$GLB,, | Performed by: NURSE PRACTITIONER

## 2022-11-10 PROCEDURE — 3008F BODY MASS INDEX DOCD: CPT | Mod: CPTII,S$GLB,, | Performed by: NURSE PRACTITIONER

## 2022-11-10 PROCEDURE — 81002 URINALYSIS NONAUTO W/O SCOPE: CPT | Mod: S$GLB,,, | Performed by: NURSE PRACTITIONER

## 2022-11-10 PROCEDURE — 3066F NEPHROPATHY DOC TX: CPT | Mod: CPTII,S$GLB,, | Performed by: NURSE PRACTITIONER

## 2022-11-10 PROCEDURE — 1160F RVW MEDS BY RX/DR IN RCRD: CPT | Mod: CPTII,S$GLB,, | Performed by: NURSE PRACTITIONER

## 2022-11-10 PROCEDURE — 1159F MED LIST DOCD IN RCRD: CPT | Mod: CPTII,S$GLB,, | Performed by: NURSE PRACTITIONER

## 2022-11-10 PROCEDURE — 4010F PR ACE/ARB THEARPY RXD/TAKEN: ICD-10-PCS | Mod: CPTII,S$GLB,, | Performed by: NURSE PRACTITIONER

## 2022-11-10 NOTE — PROGRESS NOTES
CHIEF COMPLAINT:    Roberto Edgar is a 57 y.o. male presents today for Prostate Cancer.     HISTORY OF PRESENTING ILLINESS:    Roberto Edgar is a 57 y.o. male with a history of pathologic stage IIC (T2, N0, M0,, GG4, PSA < 10) prostate cancer with biochemical failure.    10/19/2020 s/p RALP  06/30/2021 s/p ADT and XRT.  Last clinic visit with Dr. Min was 06/29/2022  Last clinic visit with Urology was 04/06/2022 with Dr. Candelario.    Here today for annual visit.   New PSA was 0.12 on 11/08/2022    He voices no major complaints.   No abdominal or bone pain.  He has some mild FUNMI; especially when bladder is full.  He does not wear pads.     He had ED prior to surgery. Had successfully used ICI therapy in the past.   Has not used it in over a year; not really having the opportunity. Not much of libido.    He is currrently working for [employer] and [employer]        He initially presented after screening PSA in June of 2020 returned at 7 ng/ml.   Biopsies from the Rt. base revealed Golden 8 (4+4) adenocarcinoma involving 15% of specimen.  Cores from the Rt. apex, Rt. mid gland Lt. apex revealed Golden 6 (3+3) adenocarcinoma involving 5% of the specimens.  He underwent RALP with bilateral lymph node dissection on 10/19/20.  Pathology revealed Sal[place] 8 (4+4) adenocarcinoma involving 35% of the prostate, mainly in the Rt. posterior mid gland and base.  There was perineural and lymphovascular invasion.    There was no extraprostatic extension or seminal vesicle invasion.   The margins of the prostate were uninvolved with tumor.    There was Sal[place] 7 (4+3) tumor noted in the separate bladder neck specimen.  Tumor extended to the cauterized margin.   One Rt. and 1 Lt. pelvic node was negative for tumor involvement; pT2, pN0, pMx.  Postoperative PSA on 11/24/20 returned at 0.74 ng/ml.    Repeat PSA on 1/6/21 returned at 0.95 ng/ml.    The patient underwent Axumin scan on 02/01/2021 which was negative for evidence of local  or distant recurrent disease.    He was referred for salvage therapy and we enrolled the patient on NRG .  He began hormonal therapy with Casodex and Lupron, he completed radiotherapy to the prostate bed and pelvic nodes on 06/30/2021.        REVIEW OF SYSTEMS:  Review of Systems   Constitutional: Negative.  Negative for chills and fever.   Eyes:  Negative for double vision.   Respiratory:  Negative for cough and shortness of breath.    Cardiovascular:  Negative for chest pain.   Gastrointestinal:  Negative for abdominal pain, constipation, diarrhea, nausea and vomiting.   Genitourinary: Negative.  Negative for dysuria, flank pain and hematuria.        Ok with urination;   Mild FUNMI   Neurological:  Negative for dizziness and seizures.   Endo/Heme/Allergies:  Negative for polydipsia.       PATIENT HISTORY:    Past Medical History:   Diagnosis Date    Erectile dysfunction due to arterial insufficiency 5/24/2018    Hypertension     Prediabetes 5/3/2019    Prostate cancer 8/24/2020    Shoulder pain, bilateral        Past Surgical History:   Procedure Laterality Date    COLONOSCOPY N/A 8/2/2018    Procedure: COLONOSCOPY;  Surgeon: Saeid Velazquez MD;  Location: [facility] (4TH FLR);  Service: Endoscopy;  Laterality: N/A;    HERNIA REPAIR      ROBOT-ASSISTED LAPAROSCOPIC PELVIC LYMPHADENECTOMY Bilateral 10/19/2020    Procedure: ROBOTIC LYMPHADENECTOMY, PELVIS;  Surgeon: Marshall Perdue MD;  Location: [facility] OR [address] [place] [address];  Service: Urology;  Laterality: Bilateral;    ROBOT-ASSISTED LAPAROSCOPIC PROSTATECTOMY USING DA SUSHMA XI N/A 10/19/2020    Procedure: XI ROBOTIC PROSTATECTOMY;  Surgeon: Marshall Perdue MD;  Location: [facility] OR [address] [place] [address];  Service: Urology;  Laterality: N/A;  3hrs/ gen with regional    TESTICLE SURGERY Right 02/2006    UMBILICAL HERNIA REPAIR N/A unknown       Family History   Problem Relation Age of Onset    Hypertension Mother     Other Mother         s/p hysterectomy for reason unknown to patient     Diabetes Father     Prostate cancer Father 60        underwent chemo; no known mets    Diabetes Maternal Grandmother     Heart disease Maternal Aunt         cardiomyopathy?    Diabetes Maternal Aunt     Multiple sclerosis Maternal Cousin     Kidney failure Maternal Cousin     Other Maternal Cousin         overweight    Diabetes Paternal Grandmother     Pneumonia Paternal Grandfather     Down syndrome Paternal Cousin     Prostate cancer Paternal Cousin     Prostate cancer Paternal Cousin     Hydrocephalus Brother     Breast cancer Other 50    Prostate cancer Other         stage IV; cause of death    Pancreatic cancer Other     Prostate cancer Paternal Uncle 83        unknown whether underwent chemo; possible mets    Prostate cancer Paternal Uncle 83        unknown whether underwent chemo; unknown whether had mets    Glaucoma Neg Hx     Macular degeneration Neg Hx     Retinal detachment Neg Hx     Strabismus Neg Hx     Cataracts Neg Hx     Amblyopia Neg Hx     Blindness Neg Hx     Melanoma Neg Hx        Social History     Socioeconomic History    Marital status:     Number of children: 1   Occupational History    Occupation: [employer]   Tobacco Use    Smoking status: Never    Smokeless tobacco: Never   Substance and Sexual Activity    Alcohol use: Yes     Comment: once in a while    Drug use: No    Sexual activity: Not Currently       Allergies:  Patient has no known allergies.    Medications:    Current Outpatient Medications:     b complex vitamins capsule, Take 1 capsule by mouth once daily., Disp: , Rfl:     blood sugar diagnostic Strp, Check blood sugars two times daily, Disp: 180 strip, Rfl: 3    lancets (LANCETS,THIN) 28 gauge Misc, 1 lancet by Misc.(Non-Drug; Combo Route) route 2 (two) times daily., Disp: 180 each, Rfl: 3    losartan (COZAAR) 100 MG tablet, Take 1 tablet by mouth once daily, Disp: 90 tablet, Rfl: 3    meloxicam (MOBIC) 15 MG tablet, Take 1 tablet by mouth once daily, Disp: 30 tablet,  Rfl: 2    multivitamin (THERAGRAN) per tablet, Take 1 tablet by mouth once daily., Disp: , Rfl:     omega-3 fatty acids/fish oil (FISH OIL-OMEGA-3 FATTY ACIDS) 300-1,000 mg capsule, Take 1 capsule by mouth once daily., Disp: , Rfl:     papaverine 30 mg/mL injection, Add: Phentolamine 10 mg Add: PGE1 1mcg Sig:  Inject 5 units as directed, Disp: 10 mL, Rfl: 11    PHYSICAL EXAMINATION:  Physical Exam  Vitals and nursing note reviewed.   Constitutional:       General: He is awake.      Appearance: Normal appearance.   HENT:      Head: Normocephalic.      Right Ear: External ear normal.      Left Ear: External ear normal.      Nose: Nose normal.   Cardiovascular:      Rate and Rhythm: Normal rate.   Pulmonary:      Effort: Pulmonary effort is normal. No respiratory distress.   Abdominal:      Tenderness: There is no abdominal tenderness. There is no right CVA tenderness or left CVA tenderness.   Genitourinary:     Comments: Prostate is surgically removed.    Musculoskeletal:         General: Normal range of motion.      Cervical back: Normal range of motion.   Skin:     General: Skin is warm and dry.   Neurological:      General: No focal deficit present.      Mental Status: He is alert and oriented to person, place, and time.   Psychiatric:         Mood and Affect: Mood normal.         Behavior: Behavior is cooperative.         LABS:      In office UA today was clear of active infection/blood.        Lab Results   Component Value Date    PSA 7.0 (H) 06/09/2020    PSA 3.7 05/02/2019    PSA 2.4 10/27/2017    PSADIAG 0.12 11/08/2022    PSADIAG 0.10 09/21/2022    PSADIAG 0.04 06/22/2022             IMPRESSION:    Encounter Diagnoses   Name Primary?    Prostate cancer Yes    History of robot-assisted laparoscopic radical prostatectomy     Rising PSA following treatment for malignant neoplasm of prostate     History of external beam radiation therapy     Erectile dysfunction following radical prostatectomy           Assessment:       1. Prostate cancer    2. History of robot-assisted laparoscopic radical prostatectomy    3. Rising PSA following treatment for malignant neoplasm of prostate    4. History of external beam radiation therapy    5. Erectile dysfunction following radical prostatectomy        Plan:         I spent 30 minutes with the patient of which more than half was spent in direct consultation with the patient in regards to our treatment and plan.  We addressed the office findings and recent labs.   Education and recommendations of today's plan of care including home remedies and needed follow up with PCP.   We discussed the chief complaint/LUTS and the possible contributory factors.   Continue the kegel exercises.   Recommended lifestyle modifications with proper, healthy diet, good hydration if no fluid restrictions; reducing bladder irritants.   Benefits of regular exercise approved by PCP.  Discussed his ED; options discussed.; good DM control  Has axumin scan and visit with dr. Min next week.  Continue close f/u  RTC one year.  Scheduled for PSA's with Dr. Min; Will get PSA then if needed.

## 2022-11-16 ENCOUNTER — HOSPITAL ENCOUNTER (OUTPATIENT)
Dept: RADIOLOGY | Facility: HOSPITAL | Age: 57
Discharge: HOME | End: 2022-11-16
Attending: RADIOLOGY
Payer: COMMERCIAL

## 2022-11-16 DIAGNOSIS — C61 PROSTATE CANCER: ICD-10-CM

## 2022-11-16 PROCEDURE — 78815 NM PET CT F 18 PYL PSMA, MIDTHIGH TO VERTEX: ICD-10-PCS | Mod: 26,PS,, | Performed by: STUDENT IN AN ORGANIZED HEALTH CARE EDUCATION/TRAINING PROGRAM

## 2022-11-16 PROCEDURE — 78815 PET IMAGE W/CT SKULL-THIGH: CPT | Mod: 26,PS,, | Performed by: STUDENT IN AN ORGANIZED HEALTH CARE EDUCATION/TRAINING PROGRAM

## 2022-11-16 PROCEDURE — 78815 PET IMAGE W/CT SKULL-THIGH: CPT | Mod: TC

## 2022-11-23 ENCOUNTER — OFFICE VISIT (OUTPATIENT)
Dept: RADIATION ONCOLOGY | Facility: CLINIC | Age: 57
End: 2022-11-23
Payer: COMMERCIAL

## 2022-11-23 VITALS
HEART RATE: 84 BPM | RESPIRATION RATE: 16 BRPM | WEIGHT: 170.5 LBS | TEMPERATURE: 97 F | BODY MASS INDEX: 28.37 KG/M2 | DIASTOLIC BLOOD PRESSURE: 92 MMHG | SYSTOLIC BLOOD PRESSURE: 143 MMHG | OXYGEN SATURATION: 99 %

## 2022-11-23 DIAGNOSIS — C61 PROSTATE CANCER: Primary | ICD-10-CM

## 2022-11-23 PROCEDURE — 3061F PR NEG MICROALBUMINURIA RESULT DOCUMENTED/REVIEW: ICD-10-PCS | Mod: CPTII,S$GLB,, | Performed by: RADIOLOGY

## 2022-11-23 PROCEDURE — 3044F PR MOST RECENT HEMOGLOBIN A1C LEVEL <7.0%: ICD-10-PCS | Mod: CPTII,S$GLB,, | Performed by: RADIOLOGY

## 2022-11-23 PROCEDURE — 99999 PR PBB SHADOW E&M-EST. PATIENT-LVL IV: ICD-10-PCS | Mod: PBBFAC,,, | Performed by: RADIOLOGY

## 2022-11-23 PROCEDURE — 99212 OFFICE O/P EST SF 10 MIN: CPT | Mod: S$GLB,,, | Performed by: RADIOLOGY

## 2022-11-23 PROCEDURE — 1160F PR REVIEW ALL MEDS BY PRESCRIBER/CLIN PHARMACIST DOCUMENTED: ICD-10-PCS | Mod: CPTII,S$GLB,, | Performed by: RADIOLOGY

## 2022-11-23 PROCEDURE — 4010F ACE/ARB THERAPY RXD/TAKEN: CPT | Mod: CPTII,S$GLB,, | Performed by: RADIOLOGY

## 2022-11-23 PROCEDURE — 3061F NEG MICROALBUMINURIA REV: CPT | Mod: CPTII,S$GLB,, | Performed by: RADIOLOGY

## 2022-11-23 PROCEDURE — 4010F PR ACE/ARB THEARPY RXD/TAKEN: ICD-10-PCS | Mod: CPTII,S$GLB,, | Performed by: RADIOLOGY

## 2022-11-23 PROCEDURE — 3008F PR BODY MASS INDEX (BMI) DOCUMENTED: ICD-10-PCS | Mod: CPTII,S$GLB,, | Performed by: RADIOLOGY

## 2022-11-23 PROCEDURE — 3066F NEPHROPATHY DOC TX: CPT | Mod: CPTII,S$GLB,, | Performed by: RADIOLOGY

## 2022-11-23 PROCEDURE — 3080F PR MOST RECENT DIASTOLIC BLOOD PRESSURE >= 90 MM HG: ICD-10-PCS | Mod: CPTII,S$GLB,, | Performed by: RADIOLOGY

## 2022-11-23 PROCEDURE — 99212 PR OFFICE/OUTPT VISIT, EST, LEVL II, 10-19 MIN: ICD-10-PCS | Mod: S$GLB,,, | Performed by: RADIOLOGY

## 2022-11-23 PROCEDURE — 99999 PR PBB SHADOW E&M-EST. PATIENT-LVL IV: CPT | Mod: PBBFAC,,, | Performed by: RADIOLOGY

## 2022-11-23 PROCEDURE — 1160F RVW MEDS BY RX/DR IN RCRD: CPT | Mod: CPTII,S$GLB,, | Performed by: RADIOLOGY

## 2022-11-23 PROCEDURE — 1159F PR MEDICATION LIST DOCUMENTED IN MEDICAL RECORD: ICD-10-PCS | Mod: CPTII,S$GLB,, | Performed by: RADIOLOGY

## 2022-11-23 PROCEDURE — 3077F SYST BP >= 140 MM HG: CPT | Mod: CPTII,S$GLB,, | Performed by: RADIOLOGY

## 2022-11-23 PROCEDURE — 3066F PR DOCUMENTATION OF TREATMENT FOR NEPHROPATHY: ICD-10-PCS | Mod: CPTII,S$GLB,, | Performed by: RADIOLOGY

## 2022-11-23 PROCEDURE — 3008F BODY MASS INDEX DOCD: CPT | Mod: CPTII,S$GLB,, | Performed by: RADIOLOGY

## 2022-11-23 PROCEDURE — 3044F HG A1C LEVEL LT 7.0%: CPT | Mod: CPTII,S$GLB,, | Performed by: RADIOLOGY

## 2022-11-23 PROCEDURE — 3077F PR MOST RECENT SYSTOLIC BLOOD PRESSURE >= 140 MM HG: ICD-10-PCS | Mod: CPTII,S$GLB,, | Performed by: RADIOLOGY

## 2022-11-23 PROCEDURE — 3080F DIAST BP >= 90 MM HG: CPT | Mod: CPTII,S$GLB,, | Performed by: RADIOLOGY

## 2022-11-23 PROCEDURE — 1159F MED LIST DOCD IN RCRD: CPT | Mod: CPTII,S$GLB,, | Performed by: RADIOLOGY

## 2022-11-23 NOTE — PROGRESS NOTES
Subjective:       Patient ID: Roberto Edgar is a 57 y.o. male.    Chief Complaint: Follow-up    This patient returns for discussion of test results.     Mr. 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  He initially presented after screening PSA in June of 2020 returned at 7 ng/ml.  Biopsies from the Rt. base revealed Kingsley 8 (4+4) adenocarcinoma involving 15% of specimen.  Cores from the Rt. apex, Rt. mid gland Lt. apex revealed Kingsley 6 (3+3) adenocarcinoma involving 5% of the specimens.  He underwent RALP with bilateral lymph node dissection on 10/19/20.  Pathology revealed Kingsley 8 (4+4) adenocarcinoma involving 35% of the prostate with perineural and lymphovascular invasion.  There was Kingsley 7 (4+3) tumor noted in the separate bladder neck specimen.  Tumor extended to the cauterized margin. One Rt. and 1 Lt. pelvic node was negative for tumor involvement.  Postoperative PSA on 11/24/20 returned at 0.74 ng/ml and increased to 0.95 ng/ml.  Axumin scan on 2/1/21 which was negative for evidence of local or distant recurrent disease.  He was referred for salvage therapy and we enrolled the patient on NRG .  He began hormonal therapy with Casodex and Lupron, he completed radiotherapy to the prostate bed and pelvic nodes on 6/30/21. Recently his PSA has increased.      Review of Systems   Constitutional:  Negative for activity change, appetite change, chills, fatigue and fever.   Respiratory:  Negative for cough and shortness of breath.    Gastrointestinal:  Negative for blood in stool, constipation, diarrhea and fecal incontinence.   Genitourinary:  Positive for erectile dysfunction. Negative for bladder incontinence, difficulty urinating, dysuria, frequency and hematuria.       Objective:      Physical Exam  Constitutional:       General: He is not in acute distress.     Appearance: Normal appearance.   Abdominal:      General: Abdomen  is flat. There is no distension.   Neurological:      Mental Status: He is alert and oriented to person, place, and time.   Psychiatric:         Mood and Affect: Mood normal.         Judgment: Judgment normal.          Latest Reference Range & Units 03/08/22 07:09 06/22/22 07:08 09/21/22 09:07 11/08/22 07:54   PSA Diagnostic 0.00 - 4.00 ng/mL <0.01 0.04 0.10 0.12       PSMA scan on 11/16/22 revealed  no PET-CT evidence of local disease recurrence or metastasis.  Assessment:       Problem List Items Addressed This Visit       Prostate cancer - Primary         Plan:       Discussed his PSA and PET scan results.  Explained he likely has micrometastatic disease outside of the pelvis.  Discussed the implications of this disease and explained his disease is treatable but likely not curable.  Discussed the role of hormonal therapy in this setting.  Also discussed the use of radiotherapy for treatment of oligometastatic disease.   Will plan to continue active surveillance at this time.  RTC in March with PSA.

## 2023-01-23 RX ORDER — LANCETS 28 GAUGE
EACH MISCELLANEOUS
Qty: 200 EACH | Refills: 3 | OUTPATIENT
Start: 2023-01-23

## 2023-01-23 RX ORDER — MELOXICAM 15 MG/1
TABLET ORAL
Qty: 30 TABLET | Refills: 0 | OUTPATIENT
Start: 2023-01-23

## 2023-03-01 ENCOUNTER — LAB VISIT (OUTPATIENT)
Dept: LAB | Facility: HOSPITAL | Age: 58
End: 2023-03-01
Attending: RADIOLOGY
Payer: COMMERCIAL

## 2023-03-01 DIAGNOSIS — C61 PROSTATE CANCER: ICD-10-CM

## 2023-03-01 PROCEDURE — 84153 ASSAY OF PSA TOTAL: CPT | Performed by: RADIOLOGY

## 2023-03-01 PROCEDURE — 36415 COLL VENOUS BLD VENIPUNCTURE: CPT | Mod: PO | Performed by: RADIOLOGY

## 2023-03-04 LAB — COMPLEXED PSA SERPL-MCNC: 0.19 NG/ML (ref 0–4)

## 2023-04-19 ENCOUNTER — PATIENT OUTREACH (OUTPATIENT)
Dept: ADMINISTRATIVE | Facility: HOSPITAL | Age: 58
End: 2023-04-19
Payer: COMMERCIAL

## 2023-04-19 NOTE — PROGRESS NOTES
Health Maintenance Due   Topic Date Due    Shingles Vaccine (1 of 2) Never done    Low Dose Statin  Never done    COVID-19 Vaccine (4 - Booster for Pfizer series) 03/26/2022    Foot Exam  10/07/2022     Chart reviewed.   Immunizations: Reconciled  Orders placed: N/A  Upcoming appts to satisfy ALEXX topics: Labs 4/20/2023

## 2023-04-20 ENCOUNTER — LAB VISIT (OUTPATIENT)
Dept: LAB | Facility: HOSPITAL | Age: 58
End: 2023-04-20
Attending: HOSPITALIST
Payer: COMMERCIAL

## 2023-04-20 DIAGNOSIS — E11.59 HYPERTENSION ASSOCIATED WITH TYPE 2 DIABETES MELLITUS: ICD-10-CM

## 2023-04-20 DIAGNOSIS — I15.2 HYPERTENSION ASSOCIATED WITH TYPE 2 DIABETES MELLITUS: ICD-10-CM

## 2023-04-20 DIAGNOSIS — E11.9 TYPE 2 DIABETES MELLITUS WITHOUT COMPLICATION, WITHOUT LONG-TERM CURRENT USE OF INSULIN: ICD-10-CM

## 2023-04-20 LAB
ALBUMIN/CREAT UR: 6 UG/MG (ref 0–30)
CREAT UR-MCNC: 151 MG/DL (ref 23–375)
MICROALBUMIN UR DL<=1MG/L-MCNC: 9 UG/ML

## 2023-04-20 PROCEDURE — 82570 ASSAY OF URINE CREATININE: CPT | Performed by: HOSPITALIST

## 2023-04-28 RX ORDER — MELOXICAM 15 MG/1
TABLET ORAL
Qty: 30 TABLET | Refills: 0 | Status: SHIPPED | OUTPATIENT
Start: 2023-04-28 | End: 2023-06-05

## 2023-05-18 NOTE — NURSING
Goal Outcome Evaluation:  Plan of Care Reviewed With: patient, spouse        Progress: improving  Outcome Evaluation: Vitals wnl. FFu/1, scant to light rubra lochia. Low transverse abdominal incision c/d/i with suture/liquid adhesive/telfa/tegaderm dressing. pyle cath patent draining clear yellow urine. SCD's on bilaterally. ERA's medication started, see MAR. Pt denies pain at this time. Bottle feeding. Bonding well. Cont with plan of care. Update MD as needed with changes.          Pt received dc instructions and verbalized understanding of instructions, iv removed no irritation, connected pt to leg bag, edu pt how to care for catheter and gave supplies, pt verbalized understanding. edu pt how to take prescriptions and pt verbalized understanding.

## 2023-05-26 ENCOUNTER — PATIENT MESSAGE (OUTPATIENT)
Dept: UROLOGY | Facility: CLINIC | Age: 58
End: 2023-05-26
Payer: COMMERCIAL

## 2023-06-05 ENCOUNTER — OFFICE VISIT (OUTPATIENT)
Dept: UROLOGY | Facility: CLINIC | Age: 58
End: 2023-06-05
Payer: COMMERCIAL

## 2023-06-05 VITALS
DIASTOLIC BLOOD PRESSURE: 97 MMHG | SYSTOLIC BLOOD PRESSURE: 151 MMHG | HEIGHT: 65 IN | HEART RATE: 89 BPM | BODY MASS INDEX: 28.32 KG/M2 | WEIGHT: 170 LBS

## 2023-06-05 DIAGNOSIS — R97.21 RISING PSA FOLLOWING TREATMENT FOR MALIGNANT NEOPLASM OF PROSTATE: ICD-10-CM

## 2023-06-05 DIAGNOSIS — Z90.79 HISTORY OF ROBOT-ASSISTED LAPAROSCOPIC RADICAL PROSTATECTOMY: ICD-10-CM

## 2023-06-05 DIAGNOSIS — N52.31 ERECTILE DYSFUNCTION FOLLOWING RADICAL PROSTATECTOMY: ICD-10-CM

## 2023-06-05 DIAGNOSIS — C61 PROSTATE CANCER: Primary | ICD-10-CM

## 2023-06-05 DIAGNOSIS — Z92.3 HISTORY OF EXTERNAL BEAM RADIATION THERAPY: ICD-10-CM

## 2023-06-05 PROCEDURE — 1160F RVW MEDS BY RX/DR IN RCRD: CPT | Mod: CPTII,S$GLB,, | Performed by: NURSE PRACTITIONER

## 2023-06-05 PROCEDURE — 3066F NEPHROPATHY DOC TX: CPT | Mod: CPTII,S$GLB,, | Performed by: NURSE PRACTITIONER

## 2023-06-05 PROCEDURE — 1159F PR MEDICATION LIST DOCUMENTED IN MEDICAL RECORD: ICD-10-PCS | Mod: CPTII,S$GLB,, | Performed by: NURSE PRACTITIONER

## 2023-06-05 PROCEDURE — 99214 PR OFFICE/OUTPT VISIT, EST, LEVL IV, 30-39 MIN: ICD-10-PCS | Mod: S$GLB,,, | Performed by: NURSE PRACTITIONER

## 2023-06-05 PROCEDURE — 3066F PR DOCUMENTATION OF TREATMENT FOR NEPHROPATHY: ICD-10-PCS | Mod: CPTII,S$GLB,, | Performed by: NURSE PRACTITIONER

## 2023-06-05 PROCEDURE — 3080F PR MOST RECENT DIASTOLIC BLOOD PRESSURE >= 90 MM HG: ICD-10-PCS | Mod: CPTII,S$GLB,, | Performed by: NURSE PRACTITIONER

## 2023-06-05 PROCEDURE — 3008F BODY MASS INDEX DOCD: CPT | Mod: CPTII,S$GLB,, | Performed by: NURSE PRACTITIONER

## 2023-06-05 PROCEDURE — 4010F ACE/ARB THERAPY RXD/TAKEN: CPT | Mod: CPTII,S$GLB,, | Performed by: NURSE PRACTITIONER

## 2023-06-05 PROCEDURE — 99999 PR PBB SHADOW E&M-EST. PATIENT-LVL III: CPT | Mod: PBBFAC,,, | Performed by: NURSE PRACTITIONER

## 2023-06-05 PROCEDURE — 3080F DIAST BP >= 90 MM HG: CPT | Mod: CPTII,S$GLB,, | Performed by: NURSE PRACTITIONER

## 2023-06-05 PROCEDURE — 99999 PR PBB SHADOW E&M-EST. PATIENT-LVL III: ICD-10-PCS | Mod: PBBFAC,,, | Performed by: NURSE PRACTITIONER

## 2023-06-05 PROCEDURE — 3044F PR MOST RECENT HEMOGLOBIN A1C LEVEL <7.0%: ICD-10-PCS | Mod: CPTII,S$GLB,, | Performed by: NURSE PRACTITIONER

## 2023-06-05 PROCEDURE — 3077F SYST BP >= 140 MM HG: CPT | Mod: CPTII,S$GLB,, | Performed by: NURSE PRACTITIONER

## 2023-06-05 PROCEDURE — 3044F HG A1C LEVEL LT 7.0%: CPT | Mod: CPTII,S$GLB,, | Performed by: NURSE PRACTITIONER

## 2023-06-05 PROCEDURE — 99214 OFFICE O/P EST MOD 30 MIN: CPT | Mod: S$GLB,,, | Performed by: NURSE PRACTITIONER

## 2023-06-05 PROCEDURE — 3061F PR NEG MICROALBUMINURIA RESULT DOCUMENTED/REVIEW: ICD-10-PCS | Mod: CPTII,S$GLB,, | Performed by: NURSE PRACTITIONER

## 2023-06-05 PROCEDURE — 3008F PR BODY MASS INDEX (BMI) DOCUMENTED: ICD-10-PCS | Mod: CPTII,S$GLB,, | Performed by: NURSE PRACTITIONER

## 2023-06-05 PROCEDURE — 1160F PR REVIEW ALL MEDS BY PRESCRIBER/CLIN PHARMACIST DOCUMENTED: ICD-10-PCS | Mod: CPTII,S$GLB,, | Performed by: NURSE PRACTITIONER

## 2023-06-05 PROCEDURE — 3077F PR MOST RECENT SYSTOLIC BLOOD PRESSURE >= 140 MM HG: ICD-10-PCS | Mod: CPTII,S$GLB,, | Performed by: NURSE PRACTITIONER

## 2023-06-05 PROCEDURE — 3061F NEG MICROALBUMINURIA REV: CPT | Mod: CPTII,S$GLB,, | Performed by: NURSE PRACTITIONER

## 2023-06-05 PROCEDURE — 4010F PR ACE/ARB THEARPY RXD/TAKEN: ICD-10-PCS | Mod: CPTII,S$GLB,, | Performed by: NURSE PRACTITIONER

## 2023-06-05 PROCEDURE — 1159F MED LIST DOCD IN RCRD: CPT | Mod: CPTII,S$GLB,, | Performed by: NURSE PRACTITIONER

## 2023-06-05 RX ORDER — MELOXICAM 15 MG/1
TABLET ORAL
Qty: 30 TABLET | Refills: 0 | Status: SHIPPED | OUTPATIENT
Start: 2023-06-05 | End: 2023-06-27

## 2023-06-05 RX ORDER — PAPAVERINE HYDROCHLORIDE 30 MG/ML
INJECTION INTRAMUSCULAR; INTRAVENOUS
Qty: 10 ML | Refills: 11 | Status: SHIPPED | OUTPATIENT
Start: 2023-06-05 | End: 2024-06-04

## 2023-06-05 NOTE — PROGRESS NOTES
CHIEF COMPLAINT:    Roberto Edgar is a 58 y.o. male presents today for Follow Up    HISTORY OF PRESENTING ILLINESS:    Roberto Edgar is a 57 y.o. male with a history of pathologic stage IIC (T2, N0, M0,, GG4, PSA < 10) prostate cancer with biochemical failure.    10/19/2020 s/p RALP  06/30/2021 s/p ADT and XRT.  Last clinic visit with Dr. Min was11/23/2022  Last clinic visit with Urology was 11/10/2022.     Here today for annual visit.  New PSA was 0.16 on 03/01/2023  Need refill of his Trimix. No using 8-10 units without issues.   He voices no major complaints.   No abdominal or bone pain.  He has some mild FUNMI; especially when bladder is full.  He does not wear pads.      He is currrently working for [employer] and [employer]       He had ED prior to surgery. Had successfully used ICI therapy in the past.   Has not used it in over a year; not really having the opportunity. Not much of libido.             He initially presented after screening PSA in June of 2020 returned at 7 ng/ml.   Biopsies from the Rt. base revealed [place] 8 (4+4) adenocarcinoma involving 15% of specimen.  Cores from the Rt. apex, Rt. mid gland Lt. apex revealed Salinas 6 (3+3) adenocarcinoma involving 5% of the specimens.  He underwent RALP with bilateral lymph node dissection on 10/19/20.  Pathology revealed [place]arien 8 (4+4) adenocarcinoma involving 35% of the prostate, mainly in the Rt. posterior mid gland and base.  There was perineural and lymphovascular invasion.    There was no extraprostatic extension or seminal vesicle invasion.   The margins of the prostate were uninvolved with tumor.    There was [place] 7 (4+3) tumor noted in the separate bladder neck specimen.  Tumor extended to the cauterized margin.   One Rt. and 1 Lt. pelvic node was negative for tumor involvement; pT2, pN0, pMx.  Postoperative PSA on 11/24/20 returned at 0.74 ng/ml.    Repeat PSA on 1/6/21 returned at 0.95 ng/ml.    The patient underwent Axumin scan on  02/01/2021 which was negative for evidence of local or distant recurrent disease.    He was referred for salvage therapy and we enrolled the patient on NRG .  He began hormonal therapy with Casodex and Lupron, he completed radiotherapy to the prostate bed and pelvic nodes on 06/30/2021.              REVIEW OF SYSTEMS:  Review of Systems   Constitutional: Negative.  Negative for chills and fever.   Eyes:  Negative for double vision.   Respiratory:  Negative for cough and shortness of breath.    Cardiovascular:  Negative for chest pain.   Gastrointestinal:  Negative for abdominal pain, constipation, diarrhea, nausea and vomiting.   Genitourinary: Negative.  Negative for dysuria, flank pain and hematuria.        Ok with urination     Neurological:  Negative for dizziness and seizures.   Endo/Heme/Allergies:  Negative for polydipsia.       PATIENT HISTORY:    Past Medical History:   Diagnosis Date    Erectile dysfunction due to arterial insufficiency 5/24/2018    Hypertension     Prediabetes 5/3/2019    Prostate cancer 8/24/2020    Shoulder pain, bilateral        Past Surgical History:   Procedure Laterality Date    COLONOSCOPY N/A 8/2/2018    Procedure: COLONOSCOPY;  Surgeon: Saeid Velazquez MD;  Location: [facility] (4TH FLR);  Service: Endoscopy;  Laterality: N/A;    HERNIA REPAIR      ROBOT-ASSISTED LAPAROSCOPIC PELVIC LYMPHADENECTOMY Bilateral 10/19/2020    Procedure: ROBOTIC LYMPHADENECTOMY, PELVIS;  Surgeon: Marshall Perdue MD;  Location: [facility] OR [address] [place] [address];  Service: Urology;  Laterality: Bilateral;    ROBOT-ASSISTED LAPAROSCOPIC PROSTATECTOMY USING DA SUSHMA XI N/A 10/19/2020    Procedure: XI ROBOTIC PROSTATECTOMY;  Surgeon: Marshall Perdue MD;  Location: [facility] OR [address] [place] [address];  Service: Urology;  Laterality: N/A;  3hrs/ gen with regional    TESTICLE SURGERY Right 02/2006    UMBILICAL HERNIA REPAIR N/A unknown       Family History   Problem Relation Age of Onset    Hypertension Mother     Other Mother          s/p hysterectomy for reason unknown to patient    Diabetes Father     Prostate cancer Father 60        underwent chemo; no known mets    Diabetes Maternal Grandmother     Heart disease Maternal Aunt         cardiomyopathy?    Diabetes Maternal Aunt     Multiple sclerosis Maternal Cousin     Kidney failure Maternal Cousin     Other Maternal Cousin         overweight    Diabetes Paternal Grandmother     Pneumonia Paternal Grandfather     Down syndrome Paternal Cousin     Prostate cancer Paternal Cousin     Prostate cancer Paternal Cousin     Hydrocephalus Brother     Breast cancer Other 50    Prostate cancer Other         stage IV; cause of death    Pancreatic cancer Other     Prostate cancer Paternal Uncle 83        unknown whether underwent chemo; possible mets    Prostate cancer Paternal Uncle 83        unknown whether underwent chemo; unknown whether had mets    Glaucoma Neg Hx     Macular degeneration Neg Hx     Retinal detachment Neg Hx     Strabismus Neg Hx     Cataracts Neg Hx     Amblyopia Neg Hx     Blindness Neg Hx     Melanoma Neg Hx        Social History     Socioeconomic History    Marital status:     Number of children: 1   Occupational History    Occupation: [employer]   Tobacco Use    Smoking status: Never    Smokeless tobacco: Never   Substance and Sexual Activity    Alcohol use: Yes     Comment: once in a while    Drug use: No    Sexual activity: Not Currently       Allergies:  Patient has no known allergies.    Medications:    Current Outpatient Medications:     b complex vitamins capsule, Take 1 capsule by mouth once daily., Disp: , Rfl:     blood sugar diagnostic Strp, Check blood sugars two times daily, Disp: 180 strip, Rfl: 3    lancets (FREESTYLE LANCETS) 28 gauge Misc, 1 lancet to check BG bidprn, Disp: 200 each, Rfl: 3    losartan (COZAAR) 100 MG tablet, Take 1 tablet by mouth once daily, Disp: 90 tablet, Rfl: 3    meloxicam (MOBIC) 15 MG tablet, Take 1 tablet by mouth once daily, Disp:  30 tablet, Rfl: 0    multivitamin (THERAGRAN) per tablet, Take 1 tablet by mouth once daily., Disp: , Rfl:     omega-3 fatty acids/fish oil (FISH OIL-OMEGA-3 FATTY ACIDS) 300-1,000 mg capsule, Take 1 capsule by mouth once daily., Disp: , Rfl:     papaverine 30 mg/mL injection, Add: Phentolamine 10 mg  Add: PGE1 1mcg  Sig:  Inject 10 units as directed; titrate up by 5 units as needed., Disp: 10 mL, Rfl: 11    PHYSICAL EXAMINATION:  Physical Exam  Vitals and nursing note reviewed.   Constitutional:       General: He is awake.      Appearance: Normal appearance.   HENT:      Head: Normocephalic.      Right Ear: External ear normal.      Left Ear: External ear normal.      Nose: Nose normal.   Cardiovascular:      Rate and Rhythm: Normal rate.   Pulmonary:      Effort: Pulmonary effort is normal. No respiratory distress.   Abdominal:      Tenderness: There is no abdominal tenderness. There is no right CVA tenderness or left CVA tenderness.   Musculoskeletal:         General: Normal range of motion.      Cervical back: Normal range of motion.   Skin:     General: Skin is warm and dry.   Neurological:      General: No focal deficit present.      Mental Status: He is alert and oriented to person, place, and time.   Psychiatric:         Mood and Affect: Mood normal.         Behavior: Behavior is cooperative.         LABS:          Lab Results   Component Value Date    PSA 7.0 (H) 06/09/2020    PSA 3.7 05/02/2019    PSA 2.4 10/27/2017    PSADIAG 0.19 03/01/2023    PSADIAG 0.12 11/08/2022    PSADIAG 0.10 09/21/2022       Lab Results   Component Value Date    CREATININE 1.1 04/20/2023    EGFRNORACEVR >60.0 04/20/2023             IMPRESSION:    Encounter Diagnoses   Name Primary?    Prostate cancer Yes    Erectile dysfunction following radical prostatectomy     History of robot-assisted laparoscopic radical prostatectomy     Rising PSA following treatment for malignant neoplasm of prostate     History of external beam radiation  therapy          Assessment:       1. Prostate cancer    2. Erectile dysfunction following radical prostatectomy    3. History of robot-assisted laparoscopic radical prostatectomy    4. Rising PSA following treatment for malignant neoplasm of prostate    5. History of external beam radiation therapy        Plan:          I spent 30 minutes with the patient of which more than half was spent in direct consultation with the patient in regards to our treatment and plan.  We addressed the office findings and recent labs.   Education and recommendations of today's plan of care including home remedies and needed follow up with PCP.   We discussed the chief complaint; reviewed the LUTS and the possible contributory factors.   Reassurance  Recommended lifestyle modifications with a proper, healthy diet, good hydration but during the day. Reducing bladder irritants.   Benefits of regular exercise/kegels   Refilled Trimix  RTC one year

## 2023-06-15 ENCOUNTER — OFFICE VISIT (OUTPATIENT)
Dept: RADIATION ONCOLOGY | Facility: CLINIC | Age: 58
End: 2023-06-15
Payer: COMMERCIAL

## 2023-06-15 ENCOUNTER — LAB VISIT (OUTPATIENT)
Dept: LAB | Facility: HOSPITAL | Age: 58
End: 2023-06-15
Attending: RADIOLOGY
Payer: COMMERCIAL

## 2023-06-15 VITALS
HEART RATE: 88 BPM | HEIGHT: 63 IN | SYSTOLIC BLOOD PRESSURE: 141 MMHG | OXYGEN SATURATION: 99 % | WEIGHT: 176.13 LBS | BODY MASS INDEX: 31.21 KG/M2 | RESPIRATION RATE: 16 BRPM | DIASTOLIC BLOOD PRESSURE: 89 MMHG

## 2023-06-15 DIAGNOSIS — C61 PROSTATE CANCER: ICD-10-CM

## 2023-06-15 DIAGNOSIS — C61 PROSTATE CANCER: Primary | ICD-10-CM

## 2023-06-15 LAB — COMPLEXED PSA SERPL-MCNC: 0.38 NG/ML (ref 0–4)

## 2023-06-15 PROCEDURE — 99212 PR OFFICE/OUTPT VISIT, EST, LEVL II, 10-19 MIN: ICD-10-PCS | Mod: S$GLB,,, | Performed by: RADIOLOGY

## 2023-06-15 PROCEDURE — 1159F PR MEDICATION LIST DOCUMENTED IN MEDICAL RECORD: ICD-10-PCS | Mod: CPTII,S$GLB,, | Performed by: RADIOLOGY

## 2023-06-15 PROCEDURE — 3077F SYST BP >= 140 MM HG: CPT | Mod: CPTII,S$GLB,, | Performed by: RADIOLOGY

## 2023-06-15 PROCEDURE — 4010F ACE/ARB THERAPY RXD/TAKEN: CPT | Mod: CPTII,S$GLB,, | Performed by: RADIOLOGY

## 2023-06-15 PROCEDURE — 99999 PR PBB SHADOW E&M-EST. PATIENT-LVL IV: CPT | Mod: PBBFAC,,, | Performed by: RADIOLOGY

## 2023-06-15 PROCEDURE — 3079F DIAST BP 80-89 MM HG: CPT | Mod: CPTII,S$GLB,, | Performed by: RADIOLOGY

## 2023-06-15 PROCEDURE — 3044F HG A1C LEVEL LT 7.0%: CPT | Mod: CPTII,S$GLB,, | Performed by: RADIOLOGY

## 2023-06-15 PROCEDURE — 3079F PR MOST RECENT DIASTOLIC BLOOD PRESSURE 80-89 MM HG: ICD-10-PCS | Mod: CPTII,S$GLB,, | Performed by: RADIOLOGY

## 2023-06-15 PROCEDURE — 1160F RVW MEDS BY RX/DR IN RCRD: CPT | Mod: CPTII,S$GLB,, | Performed by: RADIOLOGY

## 2023-06-15 PROCEDURE — 3077F PR MOST RECENT SYSTOLIC BLOOD PRESSURE >= 140 MM HG: ICD-10-PCS | Mod: CPTII,S$GLB,, | Performed by: RADIOLOGY

## 2023-06-15 PROCEDURE — 99999 PR PBB SHADOW E&M-EST. PATIENT-LVL IV: ICD-10-PCS | Mod: PBBFAC,,, | Performed by: RADIOLOGY

## 2023-06-15 PROCEDURE — 99212 OFFICE O/P EST SF 10 MIN: CPT | Mod: S$GLB,,, | Performed by: RADIOLOGY

## 2023-06-15 PROCEDURE — 36415 COLL VENOUS BLD VENIPUNCTURE: CPT | Performed by: RADIOLOGY

## 2023-06-15 PROCEDURE — 3066F NEPHROPATHY DOC TX: CPT | Mod: CPTII,S$GLB,, | Performed by: RADIOLOGY

## 2023-06-15 PROCEDURE — 4010F PR ACE/ARB THEARPY RXD/TAKEN: ICD-10-PCS | Mod: CPTII,S$GLB,, | Performed by: RADIOLOGY

## 2023-06-15 PROCEDURE — 3061F PR NEG MICROALBUMINURIA RESULT DOCUMENTED/REVIEW: ICD-10-PCS | Mod: CPTII,S$GLB,, | Performed by: RADIOLOGY

## 2023-06-15 PROCEDURE — 3044F PR MOST RECENT HEMOGLOBIN A1C LEVEL <7.0%: ICD-10-PCS | Mod: CPTII,S$GLB,, | Performed by: RADIOLOGY

## 2023-06-15 PROCEDURE — 3066F PR DOCUMENTATION OF TREATMENT FOR NEPHROPATHY: ICD-10-PCS | Mod: CPTII,S$GLB,, | Performed by: RADIOLOGY

## 2023-06-15 PROCEDURE — 3008F PR BODY MASS INDEX (BMI) DOCUMENTED: ICD-10-PCS | Mod: CPTII,S$GLB,, | Performed by: RADIOLOGY

## 2023-06-15 PROCEDURE — 1159F MED LIST DOCD IN RCRD: CPT | Mod: CPTII,S$GLB,, | Performed by: RADIOLOGY

## 2023-06-15 PROCEDURE — 3061F NEG MICROALBUMINURIA REV: CPT | Mod: CPTII,S$GLB,, | Performed by: RADIOLOGY

## 2023-06-15 PROCEDURE — 1160F PR REVIEW ALL MEDS BY PRESCRIBER/CLIN PHARMACIST DOCUMENTED: ICD-10-PCS | Mod: CPTII,S$GLB,, | Performed by: RADIOLOGY

## 2023-06-15 PROCEDURE — 84153 ASSAY OF PSA TOTAL: CPT | Performed by: RADIOLOGY

## 2023-06-15 PROCEDURE — 3008F BODY MASS INDEX DOCD: CPT | Mod: CPTII,S$GLB,, | Performed by: RADIOLOGY

## 2023-06-15 NOTE — PROGRESS NOTES
Patient ID: Roberto Edgar is a 58 y.o. male.    Chief Complaint: Prostate Cancer (6 mo f/u;psa)    This patient presents for follow up visit    Mr. 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  He initially presented after screening PSA in June of 2020 returned at 7 ng/ml.  Biopsies from the Rt. base revealed Rind[place] 8 (4+4) adenocarcinoma involving 15% of specimen.  Cores from the Rt. apex, Rt. mid gland Lt. apex revealed Salinas 6 (3+3) adenocarcinoma involving 5% of the specimens.  He underwent RALP with bilateral lymph node dissection on 10/19/20.  Pathology revealed Sal[place] 8 (4+4) adenocarcinoma involving 35% of the prostate with perineural and lymphovascular invasion.  There was Rindge 7 (4+3) tumor noted in the separate bladder neck specimen.  Tumor extended to the cauterized margin. One Rt. and 1 Lt. pelvic node was negative for tumor involvement.  Postoperative PSA on 11/24/20 returned at 0.74 ng/ml and increased to 0.95 ng/ml.  Axumin scan on 2/1/21 which was negative for evidence of local or distant recurrent disease.  He was referred for salvage therapy and we enrolled the patient on NRG .  He began hormonal therapy with Casodex and Lupron, he completed radiotherapy to the prostate bed and pelvic nodes on 6/30/21.  Recently, we noted an increase in his PSA.  Today the patient states he feels well.  No complaints.     Review of Systems   Constitutional:  Negative for activity change, appetite change, chills and fatigue.   Gastrointestinal:  Negative for change in bowel habit, constipation, diarrhea, fecal incontinence and change in bowel habit.   Genitourinary:  Positive for erectile dysfunction (using PEP injection with good results.). Negative for bladder incontinence, difficulty urinating, dysuria, frequency and hematuria.     Physical Exam  Constitutional:       General: He is not in acute distress.     Appearance: Normal  appearance.   Abdominal:      General: Abdomen is flat. There is no distension.   Neurological:      Mental Status: He is alert and oriented to person, place, and time.   Psychiatric:         Mood and Affect: Mood normal.         Judgment: Judgment normal.     1. Prostate cancer    PSA continues to increase.  Current PSA doubling time is 6 months.  Will plan to repeat PSA today.  Plan follow up in 3 - 4 months with PSA.

## 2023-06-27 RX ORDER — MELOXICAM 15 MG/1
TABLET ORAL
Qty: 30 TABLET | Refills: 5 | Status: SHIPPED | OUTPATIENT
Start: 2023-06-27 | End: 2024-02-29

## 2023-08-04 ENCOUNTER — PATIENT OUTREACH (OUTPATIENT)
Dept: ADMINISTRATIVE | Facility: HOSPITAL | Age: 58
End: 2023-08-04
Payer: COMMERCIAL

## 2023-08-04 NOTE — PROGRESS NOTES
Health Maintenance Due   Topic Date Due    Shingles Vaccine (1 of 2) Never done    Low Dose Statin  Never done    COVID-19 Vaccine (4 - Pfizer series) 03/26/2022    Foot Exam  10/07/2022    Eye Exam  07/29/2023     Chart reviewed.   Immunizations: Reconciled  Orders placed: N/A  Upcoming appts to satisfy ALEXX topics: N/A

## 2023-08-16 ENCOUNTER — OFFICE VISIT (OUTPATIENT)
Dept: INTERNAL MEDICINE | Facility: CLINIC | Age: 58
End: 2023-08-16
Payer: COMMERCIAL

## 2023-08-16 VITALS
RESPIRATION RATE: 12 BRPM | HEART RATE: 71 BPM | TEMPERATURE: 98 F | DIASTOLIC BLOOD PRESSURE: 84 MMHG | SYSTOLIC BLOOD PRESSURE: 108 MMHG | BODY MASS INDEX: 29.21 KG/M2 | WEIGHT: 164.88 LBS | OXYGEN SATURATION: 97 %

## 2023-08-16 DIAGNOSIS — R23.4 CRACKED SKIN ON FEET: ICD-10-CM

## 2023-08-16 DIAGNOSIS — E11.9 ENCOUNTER FOR DIABETIC FOOT EXAM: ICD-10-CM

## 2023-08-16 DIAGNOSIS — E11.59 HYPERTENSION ASSOCIATED WITH TYPE 2 DIABETES MELLITUS: ICD-10-CM

## 2023-08-16 DIAGNOSIS — I15.2 HYPERTENSION ASSOCIATED WITH TYPE 2 DIABETES MELLITUS: ICD-10-CM

## 2023-08-16 DIAGNOSIS — E11.9 TYPE 2 DIABETES MELLITUS WITHOUT COMPLICATION, WITHOUT LONG-TERM CURRENT USE OF INSULIN: Primary | ICD-10-CM

## 2023-08-16 PROCEDURE — 3008F PR BODY MASS INDEX (BMI) DOCUMENTED: ICD-10-PCS | Mod: CPTII,S$GLB,, | Performed by: HOSPITALIST

## 2023-08-16 PROCEDURE — 4010F ACE/ARB THERAPY RXD/TAKEN: CPT | Mod: CPTII,S$GLB,, | Performed by: HOSPITALIST

## 2023-08-16 PROCEDURE — 1159F PR MEDICATION LIST DOCUMENTED IN MEDICAL RECORD: ICD-10-PCS | Mod: CPTII,S$GLB,, | Performed by: HOSPITALIST

## 2023-08-16 PROCEDURE — 3074F SYST BP LT 130 MM HG: CPT | Mod: CPTII,S$GLB,, | Performed by: HOSPITALIST

## 2023-08-16 PROCEDURE — 3066F PR DOCUMENTATION OF TREATMENT FOR NEPHROPATHY: ICD-10-PCS | Mod: CPTII,S$GLB,, | Performed by: HOSPITALIST

## 2023-08-16 PROCEDURE — 3061F NEG MICROALBUMINURIA REV: CPT | Mod: CPTII,S$GLB,, | Performed by: HOSPITALIST

## 2023-08-16 PROCEDURE — 99214 OFFICE O/P EST MOD 30 MIN: CPT | Mod: S$GLB,,, | Performed by: HOSPITALIST

## 2023-08-16 PROCEDURE — 4010F PR ACE/ARB THEARPY RXD/TAKEN: ICD-10-PCS | Mod: CPTII,S$GLB,, | Performed by: HOSPITALIST

## 2023-08-16 PROCEDURE — 3008F BODY MASS INDEX DOCD: CPT | Mod: CPTII,S$GLB,, | Performed by: HOSPITALIST

## 2023-08-16 PROCEDURE — 1159F MED LIST DOCD IN RCRD: CPT | Mod: CPTII,S$GLB,, | Performed by: HOSPITALIST

## 2023-08-16 PROCEDURE — 1160F RVW MEDS BY RX/DR IN RCRD: CPT | Mod: CPTII,S$GLB,, | Performed by: HOSPITALIST

## 2023-08-16 PROCEDURE — 3044F PR MOST RECENT HEMOGLOBIN A1C LEVEL <7.0%: ICD-10-PCS | Mod: CPTII,S$GLB,, | Performed by: HOSPITALIST

## 2023-08-16 PROCEDURE — 99999 PR PBB SHADOW E&M-EST. PATIENT-LVL V: ICD-10-PCS | Mod: PBBFAC,,, | Performed by: HOSPITALIST

## 2023-08-16 PROCEDURE — 3044F HG A1C LEVEL LT 7.0%: CPT | Mod: CPTII,S$GLB,, | Performed by: HOSPITALIST

## 2023-08-16 PROCEDURE — 3066F NEPHROPATHY DOC TX: CPT | Mod: CPTII,S$GLB,, | Performed by: HOSPITALIST

## 2023-08-16 PROCEDURE — 3074F PR MOST RECENT SYSTOLIC BLOOD PRESSURE < 130 MM HG: ICD-10-PCS | Mod: CPTII,S$GLB,, | Performed by: HOSPITALIST

## 2023-08-16 PROCEDURE — 99999 PR PBB SHADOW E&M-EST. PATIENT-LVL V: CPT | Mod: PBBFAC,,, | Performed by: HOSPITALIST

## 2023-08-16 PROCEDURE — 1160F PR REVIEW ALL MEDS BY PRESCRIBER/CLIN PHARMACIST DOCUMENTED: ICD-10-PCS | Mod: CPTII,S$GLB,, | Performed by: HOSPITALIST

## 2023-08-16 PROCEDURE — 99214 PR OFFICE/OUTPT VISIT, EST, LEVL IV, 30-39 MIN: ICD-10-PCS | Mod: S$GLB,,, | Performed by: HOSPITALIST

## 2023-08-16 PROCEDURE — 3061F PR NEG MICROALBUMINURIA RESULT DOCUMENTED/REVIEW: ICD-10-PCS | Mod: CPTII,S$GLB,, | Performed by: HOSPITALIST

## 2023-08-16 PROCEDURE — 3079F DIAST BP 80-89 MM HG: CPT | Mod: CPTII,S$GLB,, | Performed by: HOSPITALIST

## 2023-08-16 PROCEDURE — 3079F PR MOST RECENT DIASTOLIC BLOOD PRESSURE 80-89 MM HG: ICD-10-PCS | Mod: CPTII,S$GLB,, | Performed by: HOSPITALIST

## 2023-08-16 NOTE — PROGRESS NOTES
Subjective:     @Patient ID: Roberto Edgar is a 58 y.o. male.    Chief Complaint: Follow-up and Diabetes    59 yo M with pmhx of HTN, DM2, prostate cancer s/p prostatetectomy presents for f/u     1. HTN: On losartan 100 mg qday. Bp has been controlled   2. DM2: diet controlled. A1c 6.1.  Stays active.   Due for foot and eye exam . Brought glucose log and fasting avg in 90-100s in the AM  3. Prostate cancer: s/p prostatectomy and lymph node dissection on 10/19/2020, treated after with hormonal therapy with Casodex and Lupron.  Completed radiotherapy to the prostate bed and pelvic nodes on 6/30/21. Had CT chest/abd/pel with rad oncology. Continues to follow with rad onc and urology  4. Neck pain: [facility]. Has upcoming appt with Neurology    Follow-up  Associated symptoms include arthralgias and neck pain. Pertinent negatives include no abdominal pain, chest pain, chills, congestion, coughing, fever, headaches, nausea, rash, sore throat, vomiting or weakness.   Diabetes  Pertinent negatives for hypoglycemia include no confusion or headaches. Pertinent negatives for diabetes include no chest pain, no polydipsia, no polyuria and no weakness.       Review of Systems   Constitutional:  Negative for chills and fever.   HENT:  Negative for congestion and sore throat.    Eyes:  Negative for pain and visual disturbance.   Respiratory:  Negative for cough and shortness of breath.    Cardiovascular:  Negative for chest pain and leg swelling.   Gastrointestinal:  Negative for abdominal pain, nausea and vomiting.   Endocrine: Negative for polydipsia and polyuria.   Genitourinary:  Negative for difficulty urinating and dysuria.   Musculoskeletal:  Positive for arthralgias and neck pain. Negative for back pain.   Skin:  Negative for rash and wound.   Neurological:  Negative for weakness and headaches.   Psychiatric/Behavioral:  Negative for agitation and confusion.    Past medical  history, surgical history, and family medical history reviewed and updated as appropriate.    Medications and allergies reviewed.     Objective:     Vitals:    08/16/23 1532   BP: 108/84   BP Location: Right arm   Patient Position: Sitting   BP Method: Medium (Manual)   Pulse: 71   Resp: 12   Temp: 97.7 °F (36.5 °C)   TempSrc: Temporal   SpO2: 97%   Weight: 74.8 kg (164 lb 14.5 oz)     Body mass index is 29.21 kg/m².  Physical Exam  Constitutional:       Appearance: Normal appearance.   HENT:      Head: Normocephalic and atraumatic.   Eyes:      General:         Right eye: No discharge.         Left eye: No discharge.      Conjunctiva/sclera: Conjunctivae normal.   Cardiovascular:      Rate and Rhythm: Normal rate and regular rhythm.      Pulses:           Dorsalis pedis pulses are 2+ on the right side and 2+ on the left side.        Posterior tibial pulses are 1+ on the right side and 1+ on the left side.      Heart sounds: No murmur heard.  Pulmonary:      Effort: Pulmonary effort is normal.      Breath sounds: Normal breath sounds.   Musculoskeletal:      Cervical back: Neck supple.      Right lower leg: No edema.      Left lower leg: No edema.   Feet:      Right foot:      Protective Sensation: 7 sites tested.  7 sites sensed.      Left foot:      Protective Sensation: 7 sites tested.  7 sites sensed.      Comments: Cracked skin of L plantar surface   Skin:     General: Skin is warm and dry.   Neurological:      Mental Status: He is alert and oriented to person, place, and time.   Psychiatric:         Mood and Affect: Mood normal.         Behavior: Behavior normal.     Lab Results   Component Value Date    WBC 3.94 10/10/2022    HGB 14.7 10/10/2022    HCT 44.1 10/10/2022     10/10/2022    CHOL 160 04/20/2023    TRIG 157 (H) 04/20/2023    HDL 37 (L) 04/20/2023    ALT 32 04/20/2023    AST 35 04/20/2023     04/20/2023    K 4.6 04/20/2023     04/20/2023    CREATININE 1.1 04/20/2023    BUN 17  04/20/2023    CO2 25 04/20/2023    TSH 4.403 (H) 10/10/2022    PSA 7.0 (H) 06/09/2020    HGBA1C 6.1 (H) 04/20/2023       Assessment:     1. Hypertension associated with type 2 diabetes mellitus    2. Type 2 diabetes mellitus without complication, without long-term current use of insulin      Plan:   Roberto was seen today for follow-up and diabetes.    Diagnoses and all orders for this visit:    Hypertension associated with type 2 diabetes mellitus    Type 2 diabetes mellitus without complication, without long-term current use of insulin          No follow-ups on file.    Coral Rodriguez MD  Internal Medicine    8/16/2023

## 2023-08-22 ENCOUNTER — OFFICE VISIT (OUTPATIENT)
Dept: PODIATRY | Facility: CLINIC | Age: 58
End: 2023-08-22
Payer: COMMERCIAL

## 2023-08-22 VITALS
HEIGHT: 63 IN | SYSTOLIC BLOOD PRESSURE: 127 MMHG | WEIGHT: 167.56 LBS | BODY MASS INDEX: 29.69 KG/M2 | DIASTOLIC BLOOD PRESSURE: 87 MMHG | HEART RATE: 85 BPM

## 2023-08-22 DIAGNOSIS — E11.9 TYPE 2 DIABETES MELLITUS WITHOUT COMPLICATION, WITHOUT LONG-TERM CURRENT USE OF INSULIN: ICD-10-CM

## 2023-08-22 DIAGNOSIS — G57.92 NEURITIS OF FOOT, LEFT: ICD-10-CM

## 2023-08-22 DIAGNOSIS — E11.9 ENCOUNTER FOR DIABETIC FOOT EXAM: Primary | ICD-10-CM

## 2023-08-22 DIAGNOSIS — R23.4 CRACKED SKIN ON FEET: ICD-10-CM

## 2023-08-22 PROCEDURE — 3044F PR MOST RECENT HEMOGLOBIN A1C LEVEL <7.0%: ICD-10-PCS | Mod: CPTII,S$GLB,, | Performed by: PODIATRIST

## 2023-08-22 PROCEDURE — 4010F ACE/ARB THERAPY RXD/TAKEN: CPT | Mod: CPTII,S$GLB,, | Performed by: PODIATRIST

## 2023-08-22 PROCEDURE — 99204 PR OFFICE/OUTPT VISIT, NEW, LEVL IV, 45-59 MIN: ICD-10-PCS | Mod: S$GLB,,, | Performed by: PODIATRIST

## 2023-08-22 PROCEDURE — 3008F PR BODY MASS INDEX (BMI) DOCUMENTED: ICD-10-PCS | Mod: CPTII,S$GLB,, | Performed by: PODIATRIST

## 2023-08-22 PROCEDURE — 99999 PR PBB SHADOW E&M-EST. PATIENT-LVL IV: ICD-10-PCS | Mod: PBBFAC,,, | Performed by: PODIATRIST

## 2023-08-22 PROCEDURE — 4010F PR ACE/ARB THEARPY RXD/TAKEN: ICD-10-PCS | Mod: CPTII,S$GLB,, | Performed by: PODIATRIST

## 2023-08-22 PROCEDURE — 1160F PR REVIEW ALL MEDS BY PRESCRIBER/CLIN PHARMACIST DOCUMENTED: ICD-10-PCS | Mod: CPTII,S$GLB,, | Performed by: PODIATRIST

## 2023-08-22 PROCEDURE — 3066F PR DOCUMENTATION OF TREATMENT FOR NEPHROPATHY: ICD-10-PCS | Mod: CPTII,S$GLB,, | Performed by: PODIATRIST

## 2023-08-22 PROCEDURE — 3074F SYST BP LT 130 MM HG: CPT | Mod: CPTII,S$GLB,, | Performed by: PODIATRIST

## 2023-08-22 PROCEDURE — 99999 PR PBB SHADOW E&M-EST. PATIENT-LVL IV: CPT | Mod: PBBFAC,,, | Performed by: PODIATRIST

## 2023-08-22 PROCEDURE — 3061F NEG MICROALBUMINURIA REV: CPT | Mod: CPTII,S$GLB,, | Performed by: PODIATRIST

## 2023-08-22 PROCEDURE — 3008F BODY MASS INDEX DOCD: CPT | Mod: CPTII,S$GLB,, | Performed by: PODIATRIST

## 2023-08-22 PROCEDURE — 1159F PR MEDICATION LIST DOCUMENTED IN MEDICAL RECORD: ICD-10-PCS | Mod: CPTII,S$GLB,, | Performed by: PODIATRIST

## 2023-08-22 PROCEDURE — 3044F HG A1C LEVEL LT 7.0%: CPT | Mod: CPTII,S$GLB,, | Performed by: PODIATRIST

## 2023-08-22 PROCEDURE — 1159F MED LIST DOCD IN RCRD: CPT | Mod: CPTII,S$GLB,, | Performed by: PODIATRIST

## 2023-08-22 PROCEDURE — 3074F PR MOST RECENT SYSTOLIC BLOOD PRESSURE < 130 MM HG: ICD-10-PCS | Mod: CPTII,S$GLB,, | Performed by: PODIATRIST

## 2023-08-22 PROCEDURE — 3079F DIAST BP 80-89 MM HG: CPT | Mod: CPTII,S$GLB,, | Performed by: PODIATRIST

## 2023-08-22 PROCEDURE — 99204 OFFICE O/P NEW MOD 45 MIN: CPT | Mod: S$GLB,,, | Performed by: PODIATRIST

## 2023-08-22 PROCEDURE — 3061F PR NEG MICROALBUMINURIA RESULT DOCUMENTED/REVIEW: ICD-10-PCS | Mod: CPTII,S$GLB,, | Performed by: PODIATRIST

## 2023-08-22 PROCEDURE — 3066F NEPHROPATHY DOC TX: CPT | Mod: CPTII,S$GLB,, | Performed by: PODIATRIST

## 2023-08-22 PROCEDURE — 3079F PR MOST RECENT DIASTOLIC BLOOD PRESSURE 80-89 MM HG: ICD-10-PCS | Mod: CPTII,S$GLB,, | Performed by: PODIATRIST

## 2023-08-22 PROCEDURE — 1160F RVW MEDS BY RX/DR IN RCRD: CPT | Mod: CPTII,S$GLB,, | Performed by: PODIATRIST

## 2023-08-22 RX ORDER — CLOTRIMAZOLE AND BETAMETHASONE DIPROPIONATE 10; .64 MG/G; MG/G
CREAM TOPICAL 2 TIMES DAILY
Qty: 45 G | Refills: 0 | Status: SHIPPED | OUTPATIENT
Start: 2023-08-22 | End: 2023-12-07

## 2023-08-22 NOTE — PROGRESS NOTES
Subjective:      Patient ID: Roberto Edgar is a 58 y.o. male.    Chief Complaint:   Diabetic Foot Exam (PCP- 08/16/2023/Coral Rodriguez MD) and Foot Problem (Left foot(dark spot underneath))    Roberto is a 58 y.o. male who presents to the clinic upon referral from Dr. Rodriguez  for evaluation and treatment of diabetic feet. Roberto has a past medical history of Erectile dysfunction due to arterial insufficiency (5/24/2018), Hypertension, Prediabetes (5/3/2019), Prostate cancer (8/24/2020), and Shoulder pain, bilateral.     Patient relates he is here for a itching/skin peeling to the bottom of the left foot.  He relates that this has been on and off for few years.  He is not currently experiencing the itching for the last few days    He does tried to cut it with a again [place] when the skin feels hard not using any topical    Patient wears work boots  Denies tobacco use  Denies numbness and tingling  Relates his diabetes is so controlled he is not on medication and does not consider himself diabetic    Does have an extensive foot history of a left foot injury where he had a fracture chip bone in 1989.  He relates the time he was in the  and had it wrapped.  He does get an occasional pain in his leg from it    PCP: Coral Rodriguez MD    Date Last Seen by PCP: 8/16/23    Current shoe gear:  boots     Hemoglobin A1C   Date Value Ref Range Status   04/20/2023 6.1 (H) 4.0 - 5.6 % Final     Comment:     ADA Screening Guidelines:  5.7-6.4%  Consistent with prediabetes  >or=6.5%  Consistent with diabetes    High levels of fetal hemoglobin interfere with the HbA1C  assay. Heterozygous hemoglobin variants (HbS, HgC, etc)do  not significantly interfere with this assay.   However, presence of multiple variants may affect accuracy.     09/21/2022 6.0 (H) 4.0 - 5.6 % Final     Comment:     ADA Screening Guidelines:  5.7-6.4%  Consistent with prediabetes  >or=6.5%  Consistent with diabetes    High levels of fetal hemoglobin  interfere with the HbA1C  assay. Heterozygous hemoglobin variants (HbS, HgC, etc)do  not significantly interfere with this assay.   However, presence of multiple variants may affect accuracy.     04/07/2022 6.2 (H) 4.0 - 5.6 % Final     Comment:     ADA Screening Guidelines:  5.7-6.4%  Consistent with prediabetes  >or=6.5%  Consistent with diabetes    High levels of fetal hemoglobin interfere with the HbA1C  assay. Heterozygous hemoglobin variants (HbS, HgC, etc)do  not significantly interfere with this assay.   However, presence of multiple variants may affect accuracy.            Past Medical History:   Diagnosis Date    Erectile dysfunction due to arterial insufficiency 5/24/2018    Hypertension     Prediabetes 5/3/2019    Prostate cancer 8/24/2020    Shoulder pain, bilateral      Past Surgical History:   Procedure Laterality Date    COLONOSCOPY N/A 8/2/2018    Procedure: COLONOSCOPY;  Surgeon: Saeid Velazquez MD;  Location: [facility] (4TH FLR);  Service: Endoscopy;  Laterality: N/A;    HERNIA REPAIR      ROBOT-ASSISTED LAPAROSCOPIC PELVIC LYMPHADENECTOMY Bilateral 10/19/2020    Procedure: ROBOTIC LYMPHADENECTOMY, PELVIS;  Surgeon: Marshall Perdue MD;  Location: [address];  Service: Urology;  Laterality: Bilateral;    ROBOT-ASSISTED LAPAROSCOPIC PROSTATECTOMY USING DA SUSHMA XI N/A 10/19/2020    Procedure: XI ROBOTIC PROSTATECTOMY;  Surgeon: Marshall Perdue MD;  Location: [address];  Service: Urology;  Laterality: N/A;  3hrs/ gen with regional    TESTICLE SURGERY Right 02/2006    UMBILICAL HERNIA REPAIR N/A unknown     Current Outpatient Medications on File Prior to Visit   Medication Sig Dispense Refill    b complex vitamins capsule Take 1 capsule by mouth once daily.      blood sugar diagnostic Strp Check blood sugars two times daily. Insurance preferred Freestyle strips. ICD10 E11.9 180 strip 3    lancets (FREESTYLE LANCETS) 28 gauge Misc 1 lancet to check BG bidprn 200 each 3     losartan (COZAAR) 100 MG tablet Take 1 tablet by mouth once daily 90 tablet 3    meloxicam (MOBIC) 15 MG tablet Take 1 tablet by mouth once daily 30 tablet 5    multivitamin (THERAGRAN) per tablet Take 1 tablet by mouth once daily.      omega-3 fatty acids/fish oil (FISH OIL-OMEGA-3 FATTY ACIDS) 300-1,000 mg capsule Take 1 capsule by mouth once daily.      papaverine 30 mg/mL injection Add: Phentolamine 10 mg   Add: PGE1 1mcg   Sig:  Inject 10 units as directed; titrate up by 5 units as needed. 10 mL 11     No current facility-administered medications on file prior to visit.     Review of patient's allergies indicates:  No Known Allergies    Review of Systems   Constitutional: Negative for chills, decreased appetite, fever, malaise/fatigue, night sweats, weight gain and weight loss.   Cardiovascular:  Negative for chest pain, claudication, dyspnea on exertion, leg swelling, palpitations and syncope.   Respiratory:  Negative for cough and shortness of breath.    Endocrine: Negative for cold intolerance and heat intolerance.   Hematologic/Lymphatic: Negative for bleeding problem. Does not bruise/bleed easily.   Skin:  Positive for dry skin and itching. Negative for color change, flushing, nail changes, poor wound healing, rash, skin cancer, suspicious lesions and unusual hair distribution.   Musculoskeletal:  Positive for neck pain. Negative for arthritis, back pain, falls, gout, joint pain, joint swelling, muscle cramps, muscle weakness, myalgias and stiffness.   Gastrointestinal:  Negative for diarrhea, nausea and vomiting.   Neurological:  Positive for paresthesias. Negative for dizziness, focal weakness, light-headedness, numbness, tremors, vertigo and weakness.   Psychiatric/Behavioral:  Negative for altered mental status and depression. The patient does not have insomnia.    Allergic/Immunologic: Negative.            Objective:       Vitals:    08/22/23 0836   BP: 127/87   Pulse: 85   Weight: 76 kg (167  "lb 8.8 oz)   Height: 5' 3" (1.6 m)   PainSc: 0-No pain   76 kg (167 lb 8.8 oz)     Physical Exam  Vitals reviewed.   Constitutional:       General: He is not in acute distress.     Appearance: He is well-developed. He is not ill-appearing, toxic-appearing or diaphoretic.      Comments: Proper supportive shoegear      Cardiovascular:      Pulses:           Dorsalis pedis pulses are 2+ on the right side and 2+ on the left side.        Posterior tibial pulses are 2+ on the right side and 2+ on the left side.   Musculoskeletal:         General: No swelling, tenderness or deformity.      Right lower leg: No edema.      Left lower leg: No edema.      Right ankle: Normal.      Right Achilles Tendon: Normal.      Left ankle: Normal.      Left Achilles Tendon: Normal.      Right foot: Decreased range of motion. No deformity, tenderness or bony tenderness.      Left foot: Decreased range of motion. No deformity, tenderness or bony tenderness.      Comments: No significant deformity noted    Flexible pes planus foot type w/ medial arch collapse and mild gastroc equinus      Strength 5/5 bilateral   Feet:      Right foot:      Protective Sensation: 10 sites tested.  10 sites sensed.      Skin integrity: No ulcer, blister, skin breakdown, erythema, warmth, callus or dry skin.      Toenail Condition: Right toenails are normal.      Left foot:      Protective Sensation: 10 sites tested.  10 sites sensed.      Skin integrity: Dry skin present. No ulcer, blister, skin breakdown, erythema, warmth or callus.      Toenail Condition: Left toenails are normal.      Comments: Mild xerosis to lateral aspect of foot and heels no open lesions no signs of infection no fluctuance no erythema    Interspaces are clear    [place] intact  Skin:     General: Skin is warm and dry.      Capillary Refill: Capillary refill takes 2 to 3 seconds.      Coloration: Skin is not pale.      Findings: No erythema or rash.      Nails: There is no " clubbing.   Neurological:      Mental Status: He is alert and oriented to person, place, and time.      Gait: Gait is intact.   Psychiatric:         Attention and Perception: Attention normal.         Mood and Affect: Mood normal.         Speech: Speech normal.         Behavior: Behavior normal.         Thought Content: Thought content normal.         Cognition and Memory: Cognition normal.         Judgment: Judgment normal.               Assessment:       Encounter Diagnoses   Name Primary?    Encounter for diabetic foot exam Yes    Cracked skin on feet     Type 2 diabetes mellitus without complication, without long-term current use of insulin     Neuritis of foot, left          Plan:       Roberto was seen today for diabetic foot exam and foot problem.    Diagnoses and all orders for this visit:    Encounter for diabetic foot exam  -     Ambulatory referral/consult to Podiatry    Cracked skin on feet  -     Ambulatory referral/consult to Podiatry    Type 2 diabetes mellitus without complication, without long-term current use of insulin    Neuritis of foot, left    Other orders  -     clotrimazole-betamethasone 1-0.05% (LOTRISONE) cream; Apply topically 2 (two) times daily. for 14 days      I counseled the patient on his conditions, their implications and medical management.    DM foot exam      - d/w pt itching likely nerve pain from previous foot injury; currently asymptomatic today    - now getting skin issues secondary  to scratching regimen Lotrisone    - recommend get EMG/NCV [facility]    - [facility]; consider repeat    F/u for yearly foot exam      .           Follow up in about 1 year (around 8/22/2024), or if symptoms worsen or fail to improve.

## 2023-08-23 ENCOUNTER — LAB VISIT (OUTPATIENT)
Dept: LAB | Facility: HOSPITAL | Age: 58
End: 2023-08-23
Attending: HOSPITALIST
Payer: COMMERCIAL

## 2023-08-23 DIAGNOSIS — E11.59 HYPERTENSION ASSOCIATED WITH TYPE 2 DIABETES MELLITUS: ICD-10-CM

## 2023-08-23 DIAGNOSIS — E11.9 TYPE 2 DIABETES MELLITUS WITHOUT COMPLICATION, WITHOUT LONG-TERM CURRENT USE OF INSULIN: ICD-10-CM

## 2023-08-23 DIAGNOSIS — I15.2 HYPERTENSION ASSOCIATED WITH TYPE 2 DIABETES MELLITUS: ICD-10-CM

## 2023-08-23 LAB
ALBUMIN/CREAT UR: 5 UG/MG (ref 0–30)
CREAT UR-MCNC: 199 MG/DL (ref 23–375)
MICROALBUMIN UR DL<=1MG/L-MCNC: 10 UG/ML

## 2023-08-23 PROCEDURE — 82570 ASSAY OF URINE CREATININE: CPT | Performed by: HOSPITALIST

## 2023-09-19 ENCOUNTER — LAB VISIT (OUTPATIENT)
Dept: LAB | Facility: HOSPITAL | Age: 58
End: 2023-09-19
Attending: RADIOLOGY
Payer: COMMERCIAL

## 2023-09-19 DIAGNOSIS — C61 PROSTATE CANCER: ICD-10-CM

## 2023-09-19 LAB — COMPLEXED PSA SERPL-MCNC: 0.56 NG/ML (ref 0–4)

## 2023-09-19 PROCEDURE — 84153 ASSAY OF PSA TOTAL: CPT | Performed by: RADIOLOGY

## 2023-09-19 PROCEDURE — 36415 COLL VENOUS BLD VENIPUNCTURE: CPT | Performed by: RADIOLOGY

## 2023-09-26 ENCOUNTER — OFFICE VISIT (OUTPATIENT)
Dept: RADIATION ONCOLOGY | Facility: CLINIC | Age: 58
End: 2023-09-26
Payer: COMMERCIAL

## 2023-09-26 VITALS
BODY MASS INDEX: 30.38 KG/M2 | HEART RATE: 81 BPM | OXYGEN SATURATION: 98 % | DIASTOLIC BLOOD PRESSURE: 81 MMHG | WEIGHT: 171.5 LBS | SYSTOLIC BLOOD PRESSURE: 134 MMHG | RESPIRATION RATE: 20 BRPM

## 2023-09-26 DIAGNOSIS — C61 PROSTATE CANCER: Primary | ICD-10-CM

## 2023-09-26 PROCEDURE — 3044F HG A1C LEVEL LT 7.0%: CPT | Mod: CPTII,S$GLB,, | Performed by: RADIOLOGY

## 2023-09-26 PROCEDURE — 3061F PR NEG MICROALBUMINURIA RESULT DOCUMENTED/REVIEW: ICD-10-PCS | Mod: CPTII,S$GLB,, | Performed by: RADIOLOGY

## 2023-09-26 PROCEDURE — 1160F PR REVIEW ALL MEDS BY PRESCRIBER/CLIN PHARMACIST DOCUMENTED: ICD-10-PCS | Mod: CPTII,S$GLB,, | Performed by: RADIOLOGY

## 2023-09-26 PROCEDURE — 3066F NEPHROPATHY DOC TX: CPT | Mod: CPTII,S$GLB,, | Performed by: RADIOLOGY

## 2023-09-26 PROCEDURE — 3075F SYST BP GE 130 - 139MM HG: CPT | Mod: CPTII,S$GLB,, | Performed by: RADIOLOGY

## 2023-09-26 PROCEDURE — 1159F PR MEDICATION LIST DOCUMENTED IN MEDICAL RECORD: ICD-10-PCS | Mod: CPTII,S$GLB,, | Performed by: RADIOLOGY

## 2023-09-26 PROCEDURE — 99999 PR PBB SHADOW E&M-EST. PATIENT-LVL III: ICD-10-PCS | Mod: PBBFAC,,, | Performed by: RADIOLOGY

## 2023-09-26 PROCEDURE — 3061F NEG MICROALBUMINURIA REV: CPT | Mod: CPTII,S$GLB,, | Performed by: RADIOLOGY

## 2023-09-26 PROCEDURE — 99212 OFFICE O/P EST SF 10 MIN: CPT | Mod: S$GLB,,, | Performed by: RADIOLOGY

## 2023-09-26 PROCEDURE — 3008F PR BODY MASS INDEX (BMI) DOCUMENTED: ICD-10-PCS | Mod: CPTII,S$GLB,, | Performed by: RADIOLOGY

## 2023-09-26 PROCEDURE — 4010F PR ACE/ARB THEARPY RXD/TAKEN: ICD-10-PCS | Mod: CPTII,S$GLB,, | Performed by: RADIOLOGY

## 2023-09-26 PROCEDURE — 1159F MED LIST DOCD IN RCRD: CPT | Mod: CPTII,S$GLB,, | Performed by: RADIOLOGY

## 2023-09-26 PROCEDURE — 3079F DIAST BP 80-89 MM HG: CPT | Mod: CPTII,S$GLB,, | Performed by: RADIOLOGY

## 2023-09-26 PROCEDURE — 3075F PR MOST RECENT SYSTOLIC BLOOD PRESS GE 130-139MM HG: ICD-10-PCS | Mod: CPTII,S$GLB,, | Performed by: RADIOLOGY

## 2023-09-26 PROCEDURE — 99212 PR OFFICE/OUTPT VISIT, EST, LEVL II, 10-19 MIN: ICD-10-PCS | Mod: S$GLB,,, | Performed by: RADIOLOGY

## 2023-09-26 PROCEDURE — 3066F PR DOCUMENTATION OF TREATMENT FOR NEPHROPATHY: ICD-10-PCS | Mod: CPTII,S$GLB,, | Performed by: RADIOLOGY

## 2023-09-26 PROCEDURE — 1160F RVW MEDS BY RX/DR IN RCRD: CPT | Mod: CPTII,S$GLB,, | Performed by: RADIOLOGY

## 2023-09-26 PROCEDURE — 99999 PR PBB SHADOW E&M-EST. PATIENT-LVL III: CPT | Mod: PBBFAC,,, | Performed by: RADIOLOGY

## 2023-09-26 PROCEDURE — 3008F BODY MASS INDEX DOCD: CPT | Mod: CPTII,S$GLB,, | Performed by: RADIOLOGY

## 2023-09-26 PROCEDURE — 3079F PR MOST RECENT DIASTOLIC BLOOD PRESSURE 80-89 MM HG: ICD-10-PCS | Mod: CPTII,S$GLB,, | Performed by: RADIOLOGY

## 2023-09-26 PROCEDURE — 3044F PR MOST RECENT HEMOGLOBIN A1C LEVEL <7.0%: ICD-10-PCS | Mod: CPTII,S$GLB,, | Performed by: RADIOLOGY

## 2023-09-26 PROCEDURE — 4010F ACE/ARB THERAPY RXD/TAKEN: CPT | Mod: CPTII,S$GLB,, | Performed by: RADIOLOGY

## 2023-09-26 NOTE — PROGRESS NOTES
[facility] - Radiation Oncology     Patient ID: Roberto Edgar is a 58 y.o. male.    Chief Complaint: No chief complaint on file.    Mr. 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  He initially presented after screening PSA in June of 2020 returned at 7 ng/ml.  Biopsies from the Rt. base revealed [place] 8 (4+4) adenocarcinoma involving 15% of specimen.  Cores from the Rt. apex, Rt. mid gland Lt. apex revealed Salinas 6 (3+3) adenocarcinoma involving 5% of the specimens.  He underwent RALP with bilateral lymph node dissection on 10/19/20.  Pathology revealed [place]ittier 8 (4+4) adenocarcinoma involving 35% of the prostate with perineural and lymphovascular invasion.  There was Salinas 7 (4+3) tumor noted in the separate bladder neck specimen.  Tumor extended to the cauterized margin. One Rt. and 1 Lt. pelvic node was negative for tumor involvement.  Postoperative PSA on 11/24/20 returned at 0.74 ng/ml and increased to 0.95 ng/ml.  Axumin scan on 2/1/21 which was negative for evidence of local or distant recurrent disease.  He was referred for salvage therapy and we enrolled the patient on NRG .  He began hormonal therapy with Casodex and Lupron, he completed radiotherapy to the prostate bed and pelvic nodes on 6/30/21.  Post treatment, his PSA has continued to increase.  Work up to date has been negative.  Today the patient states he feels well.  No complaints.       Review of Systems   Constitutional:  Negative for activity change, appetite change, chills, diaphoresis and fatigue.   Respiratory:  Negative for cough and shortness of breath.    Gastrointestinal:  Negative for abdominal pain, constipation, diarrhea and fecal incontinence.   Genitourinary:  Positive for bladder incontinence (mild urge incontinence) and erectile dysfunction (sexually active with PEP injections). Negative for difficulty urinating, dysuria,  frequency and hematuria.     Physical Exam  Constitutional:       General: He is not in acute distress.     Appearance: Normal appearance.   Pulmonary:      Effort: Pulmonary effort is normal. No respiratory distress.   Abdominal:      General: Abdomen is flat. There is no distension.   Neurological:      Mental Status: He is alert and oriented to person, place, and time.   Psychiatric:         Mood and Affect: Mood normal.         Judgment: Judgment normal.          Latest Reference Range & Units 03/01/23 11:10 06/15/23 14:32 09/19/23 14:32   PSA Diagnostic 0.00 - 4.00 ng/mL 0.19 0.38 0.56      Assessment and Plan     1. Prostate cancer      PSA continues to increase  Will plan repeat PET scan

## 2023-11-12 DIAGNOSIS — I10 ESSENTIAL HYPERTENSION: ICD-10-CM

## 2023-11-12 RX ORDER — LOSARTAN POTASSIUM 100 MG/1
TABLET ORAL
Qty: 90 TABLET | Refills: 3 | Status: SHIPPED | OUTPATIENT
Start: 2023-11-12

## 2023-11-12 NOTE — TELEPHONE ENCOUNTER
Provider Staff:  Action required for this patient         Please see care gap opportunities below in Care Due Message.    Thanks!  [facility]     Appointments      Date Provider   Last Visit   8/16/2023 Coral Rodriguez MD   Next Visit   2/23/2024 Coral Rodriguez MD     Refill Decision Note   Roberto Edgar  is requesting a refill authorization.  Brief Assessment and Rationale for Refill:  Approve     Medication Therapy Plan:         Comments:     Note composed:1:22 PM 11/12/2023

## 2023-11-12 NOTE — TELEPHONE ENCOUNTER
Care Due:                  Date            Visit Type   Department     Provider  --------------------------------------------------------------------------------                                EP -                              PRIMARY      [facility] INTERNAL  Last Visit: 08-      CARE ([facility])   MEDICINE       Coral  Michael                              EP -                              PRIMARY      [facility] INTERNAL  Next Visit: 02-      CARE ([facility])   [facility]                                                            Last  Test          Frequency    Reason                     Performed    Due Date  --------------------------------------------------------------------------------    CBC.........  12 months..  meloxicam................  10-   10-    [facility] Embedded Care Due Messages. Reference number: [id number].   11/12/2023 1:19:09 PM CST

## 2023-12-07 RX ORDER — CLOTRIMAZOLE AND BETAMETHASONE DIPROPIONATE 10; .64 MG/G; MG/G
CREAM TOPICAL 2 TIMES DAILY
Qty: 45 G | Refills: 0 | Status: SHIPPED | OUTPATIENT
Start: 2023-12-07

## 2024-02-21 ENCOUNTER — PATIENT MESSAGE (OUTPATIENT)
Dept: ADMINISTRATIVE | Facility: HOSPITAL | Age: 59
End: 2024-02-21
Payer: COMMERCIAL

## 2024-02-29 ENCOUNTER — LAB VISIT (OUTPATIENT)
Dept: LAB | Facility: HOSPITAL | Age: 59
End: 2024-02-29
Payer: COMMERCIAL

## 2024-02-29 ENCOUNTER — OFFICE VISIT (OUTPATIENT)
Dept: INTERNAL MEDICINE | Facility: CLINIC | Age: 59
End: 2024-02-29
Payer: COMMERCIAL

## 2024-02-29 VITALS
OXYGEN SATURATION: 98 % | HEIGHT: 65 IN | RESPIRATION RATE: 18 BRPM | WEIGHT: 170 LBS | BODY MASS INDEX: 28.32 KG/M2 | SYSTOLIC BLOOD PRESSURE: 112 MMHG | HEART RATE: 68 BPM | TEMPERATURE: 98 F | DIASTOLIC BLOOD PRESSURE: 86 MMHG

## 2024-02-29 DIAGNOSIS — Z11.3 SCREENING EXAMINATION FOR STD (SEXUALLY TRANSMITTED DISEASE): ICD-10-CM

## 2024-02-29 DIAGNOSIS — C61 PROSTATE CANCER: ICD-10-CM

## 2024-02-29 DIAGNOSIS — Z00.00 ENCOUNTER FOR ANNUAL HEALTH EXAMINATION: Primary | ICD-10-CM

## 2024-02-29 DIAGNOSIS — E11.9 TYPE 2 DIABETES MELLITUS WITHOUT COMPLICATION, WITHOUT LONG-TERM CURRENT USE OF INSULIN: ICD-10-CM

## 2024-02-29 DIAGNOSIS — I15.2 HYPERTENSION ASSOCIATED WITH TYPE 2 DIABETES MELLITUS: ICD-10-CM

## 2024-02-29 DIAGNOSIS — E11.59 HYPERTENSION ASSOCIATED WITH TYPE 2 DIABETES MELLITUS: ICD-10-CM

## 2024-02-29 DIAGNOSIS — M54.12 CERVICAL RADICULOPATHY: ICD-10-CM

## 2024-02-29 DIAGNOSIS — G47.33 OBSTRUCTIVE SLEEP APNEA (ADULT) (PEDIATRIC): ICD-10-CM

## 2024-02-29 DIAGNOSIS — N52.31 ERECTILE DYSFUNCTION FOLLOWING RADICAL PROSTATECTOMY: ICD-10-CM

## 2024-02-29 DIAGNOSIS — Z23 NEED FOR VACCINATION: ICD-10-CM

## 2024-02-29 PROBLEM — R73.03 PRE-DIABETES: Status: ACTIVE | Noted: 2021-07-07

## 2024-02-29 LAB
ALBUMIN SERPL BCP-MCNC: 4 G/DL (ref 3.5–5.2)
ALP SERPL-CCNC: 63 U/L (ref 55–135)
ALT SERPL W/O P-5'-P-CCNC: 35 U/L (ref 10–44)
ANION GAP SERPL CALC-SCNC: 9 MMOL/L (ref 8–16)
AST SERPL-CCNC: 28 U/L (ref 10–40)
BILIRUB SERPL-MCNC: 0.3 MG/DL (ref 0.1–1)
BUN SERPL-MCNC: 14 MG/DL (ref 6–20)
CALCIUM SERPL-MCNC: 9.7 MG/DL (ref 8.7–10.5)
CHLORIDE SERPL-SCNC: 107 MMOL/L (ref 95–110)
CHOLEST SERPL-MCNC: 147 MG/DL (ref 120–199)
CHOLEST/HDLC SERPL: 3.9 {RATIO} (ref 2–5)
CO2 SERPL-SCNC: 26 MMOL/L (ref 23–29)
COMPLEXED PSA SERPL-MCNC: 0.58 NG/ML (ref 0–4)
CREAT SERPL-MCNC: 0.9 MG/DL (ref 0.5–1.4)
EST. GFR  (NO RACE VARIABLE): >60 ML/MIN/1.73 M^2
ESTIMATED AVG GLUCOSE: 128 MG/DL (ref 68–131)
GLUCOSE SERPL-MCNC: 87 MG/DL (ref 70–110)
HAV IGM SERPL QL IA: NORMAL
HBA1C MFR BLD: 6.1 % (ref 4–5.6)
HBV CORE IGM SERPL QL IA: NORMAL
HBV SURFACE AG SERPL QL IA: NORMAL
HCV AB SERPL QL IA: NORMAL
HDLC SERPL-MCNC: 38 MG/DL (ref 40–75)
HDLC SERPL: 25.9 % (ref 20–50)
HIV 1+2 AB+HIV1 P24 AG SERPL QL IA: NORMAL
LDLC SERPL CALC-MCNC: 92.6 MG/DL (ref 63–159)
NONHDLC SERPL-MCNC: 109 MG/DL
POTASSIUM SERPL-SCNC: 4.4 MMOL/L (ref 3.5–5.1)
PROT SERPL-MCNC: 7.3 G/DL (ref 6–8.4)
SODIUM SERPL-SCNC: 142 MMOL/L (ref 136–145)
TRIGL SERPL-MCNC: 82 MG/DL (ref 30–150)

## 2024-02-29 PROCEDURE — 1159F MED LIST DOCD IN RCRD: CPT | Mod: CPTII,S$GLB,, | Performed by: NURSE PRACTITIONER

## 2024-02-29 PROCEDURE — 84153 ASSAY OF PSA TOTAL: CPT | Performed by: NURSE PRACTITIONER

## 2024-02-29 PROCEDURE — 80053 COMPREHEN METABOLIC PANEL: CPT | Performed by: NURSE PRACTITIONER

## 2024-02-29 PROCEDURE — 3008F BODY MASS INDEX DOCD: CPT | Mod: CPTII,S$GLB,, | Performed by: NURSE PRACTITIONER

## 2024-02-29 PROCEDURE — 90471 IMMUNIZATION ADMIN: CPT | Mod: S$GLB,,, | Performed by: NURSE PRACTITIONER

## 2024-02-29 PROCEDURE — 87389 HIV-1 AG W/HIV-1&-2 AB AG IA: CPT | Performed by: NURSE PRACTITIONER

## 2024-02-29 PROCEDURE — 4010F ACE/ARB THERAPY RXD/TAKEN: CPT | Mod: CPTII,S$GLB,, | Performed by: NURSE PRACTITIONER

## 2024-02-29 PROCEDURE — 83036 HEMOGLOBIN GLYCOSYLATED A1C: CPT | Performed by: NURSE PRACTITIONER

## 2024-02-29 PROCEDURE — 3079F DIAST BP 80-89 MM HG: CPT | Mod: CPTII,S$GLB,, | Performed by: NURSE PRACTITIONER

## 2024-02-29 PROCEDURE — 99999 PR PBB SHADOW E&M-EST. PATIENT-LVL IV: CPT | Mod: PBBFAC,,, | Performed by: NURSE PRACTITIONER

## 2024-02-29 PROCEDURE — 80061 LIPID PANEL: CPT | Performed by: NURSE PRACTITIONER

## 2024-02-29 PROCEDURE — 3074F SYST BP LT 130 MM HG: CPT | Mod: CPTII,S$GLB,, | Performed by: NURSE PRACTITIONER

## 2024-02-29 PROCEDURE — 90686 IIV4 VACC NO PRSV 0.5 ML IM: CPT | Mod: S$GLB,,, | Performed by: NURSE PRACTITIONER

## 2024-02-29 PROCEDURE — 86592 SYPHILIS TEST NON-TREP QUAL: CPT | Performed by: NURSE PRACTITIONER

## 2024-02-29 PROCEDURE — 99396 PREV VISIT EST AGE 40-64: CPT | Mod: 25,S$GLB,, | Performed by: NURSE PRACTITIONER

## 2024-02-29 PROCEDURE — 1160F RVW MEDS BY RX/DR IN RCRD: CPT | Mod: CPTII,S$GLB,, | Performed by: NURSE PRACTITIONER

## 2024-02-29 PROCEDURE — 80074 ACUTE HEPATITIS PANEL: CPT | Performed by: NURSE PRACTITIONER

## 2024-02-29 PROCEDURE — 36415 COLL VENOUS BLD VENIPUNCTURE: CPT | Mod: PO | Performed by: NURSE PRACTITIONER

## 2024-02-29 RX ORDER — GABAPENTIN 300 MG/1
CAPSULE ORAL
COMMUNITY
Start: 2023-09-26 | End: 2024-09-26

## 2024-02-29 NOTE — PROGRESS NOTES
Subjective:       Patient ID: Roberto Edgar is a 58 y.o. male.    Chief Complaint: Annual Wellness Visit    Roberto Edgar is a 58 y.o. male who presents today for an annual wellness visit.     He voices no concerns or complaints.     Review of patient's allergies indicates:  No Known Allergies   Medication List with Changes/Refills   Current Medications    B COMPLEX VITAMINS CAPSULE    Take 1 capsule by mouth once daily.    BLOOD SUGAR DIAGNOSTIC STRP    Check blood sugars two times daily. Insurance preferred Freestyle strips. ICD10 E11.9    CLOTRIMAZOLE-BETAMETHASONE 1-0.05% (LOTRISONE) CREAM    APPLY TOPICALLY TWICE DAILY FOR 14 DAYS    GABAPENTIN (NEURONTIN) 300 MG CAPSULE    TAKE ONE CAPSULE BY MOUTH THREE TIMES A DAY FOR NEUROPATHIC PAIN    LANCETS (FREESTYLE LANCETS) 28 GAUGE MISC    1 lancet to check BG bidprn    LOSARTAN (COZAAR) 100 MG TABLET    Take 1 tablet by mouth once daily    MULTIVITAMIN (THERAGRAN) PER TABLET    Take 1 tablet by mouth once daily.    OMEGA-3 FATTY ACIDS/FISH OIL (FISH OIL-OMEGA-3 FATTY ACIDS) 300-1,000 MG CAPSULE    Take 1 capsule by mouth once daily.    PAPAVERINE 30 MG/ML INJECTION    Add: Phentolamine 10 mg   Add: PGE1 1mcg   Sig:  Inject 10 units as directed; titrate up by 5 units as needed.   Discontinued Medications    MELOXICAM (MOBIC) 15 MG TABLET    Take 1 tablet by mouth once daily       Health Maintenance    Colonoscopy: 08/02/2018  (due in 2028)  Flu Vaccine: 02/29/2024   Pneumonia 23 Vaccine: 10/07/2021  Pneumonia 13 Vaccine: 07/07/2021  Tetanus/Tdap: 08/28/2023  Zoster Vaccine: 02/27/2024; 08/28/2023  Hepatitis C Screen: 05/02/2019  HIV Screen: 06/10/2020  PSA: 09/19/2023  Eye Exam: 07/29/2022  Foot Exam: 08/22/2023  Microalbumin: 08/23/2023      Patient ambulates on his own without an assistive device.     On average, how many days per week do you do moderate to strenuous exercise:  (like a brisk walk or jog; this does not include your job/work)  0   On average, how  "many minutes do you exercise at this level each day? 0  We encourage you to start exercising if you do not already, continue at your current level or increase your level of activity.     Have you often been bothered by feeling down, depressed, or hopeless?  Not at all    Review of Systems   Constitutional:  Negative for chills and fever.   Respiratory:  Negative for cough and shortness of breath.    Cardiovascular:  Negative for chest pain.   Musculoskeletal:  Positive for neck pain.   Neurological:  Positive for tingling. Negative for dizziness and headaches.     Objective:     /86 (BP Location: Right arm, Patient Position: Sitting, BP Method: Medium (Manual))   Pulse 68   Temp 97.6 °F (36.4 °C) (Temporal)   Resp 18   Ht 5' 5" (1.651 m)   Wt 77.1 kg (169 lb 15.6 oz)   SpO2 98%   BMI 28.29 kg/m²     Physical Exam  Vitals reviewed.   Constitutional:       Appearance: Normal appearance.   HENT:      Head: Normocephalic and atraumatic.   Cardiovascular:      Rate and Rhythm: Normal rate and regular rhythm.      Heart sounds: Normal heart sounds. No murmur heard.  Pulmonary:      Effort: Pulmonary effort is normal.      Breath sounds: Normal breath sounds. No wheezing.   Skin:     General: Skin is warm and dry.   Neurological:      Mental Status: He is alert and oriented to person, place, and time.       BP Readings from Last 3 Encounters:   02/29/24 112/86   09/26/23 134/81   08/22/23 127/87     Wt Readings from Last 3 Encounters:   02/29/24 77.1 kg (169 lb 15.6 oz)   09/26/23 77.8 kg (171 lb 8.3 oz)   08/22/23 76 kg (167 lb 8.8 oz)       I reviewed and independently interpreted the labs and imaging below:  Last Labs:  Glucose   Date Value Ref Range Status   08/23/2023 114 (H) 70 - 110 mg/dL Final   04/20/2023 112 (H) 70 - 110 mg/dL Final     BUN   Date Value Ref Range Status   08/23/2023 17 6 - 20 mg/dL Final   04/20/2023 17 6 - 20 mg/dL Final     Creatinine   Date Value Ref Range Status   08/23/2023 1.1 " 0.5 - 1.4 mg/dL Final   04/20/2023 1.1 0.5 - 1.4 mg/dL Final     Cholesterol   Date Value Ref Range Status   08/23/2023 159 120 - 199 mg/dL Final     Comment:     The National Cholesterol Education Program (NCEP) has set the  following guidelines (reference ranges) for Cholesterol:  Optimal.....................<200 mg/dL  Borderline High.............200-239 mg/dL  High........................> or = 240 mg/dL     04/20/2023 160 120 - 199 mg/dL Final     Comment:     The National Cholesterol Education Program (NCEP) has set the  following guidelines (reference ranges) for Cholesterol:  Optimal.....................<200 mg/dL  Borderline High.............200-239 mg/dL  High........................> or = 240 mg/dL       Hemoglobin A1C   Date Value Ref Range Status   08/23/2023 5.8 (H) 4.0 - 5.6 % Final     Comment:     ADA Screening Guidelines:  5.7-6.4%  Consistent with prediabetes  >or=6.5%  Consistent with diabetes    High levels of fetal hemoglobin interfere with the HbA1C  assay. Heterozygous hemoglobin variants (HbS, HgC, etc)do  not significantly interfere with this assay.   However, presence of multiple variants may affect accuracy.     04/20/2023 6.1 (H) 4.0 - 5.6 % Final     Comment:     ADA Screening Guidelines:  5.7-6.4%  Consistent with prediabetes  >or=6.5%  Consistent with diabetes    High levels of fetal hemoglobin interfere with the HbA1C  assay. Heterozygous hemoglobin variants (HbS, HgC, etc)do  not significantly interfere with this assay.   However, presence of multiple variants may affect accuracy.       Hemoglobin   Date Value Ref Range Status   10/10/2022 14.7 14.0 - 18.0 g/dL Final   12/10/2021 13.0 (L) 14.0 - 18.0 g/dL Final     Hematocrit   Date Value Ref Range Status   10/10/2022 44.1 40.0 - 54.0 % Final   12/10/2021 38.6 (L) 40.0 - 54.0 % Final     Vit D, 25-Hydroxy   Date Value Ref Range Status   06/09/2020 29 (L) 30 - 96 ng/mL Final     Comment:     Vitamin D deficiency.........<10 ng/mL                               Vitamin D insufficiency......10-29 ng/mL       Vitamin D sufficiency........> or equal to 30 ng/mL  Vitamin D toxicity............>100 ng/mL     05/02/2019 34 30 - 96 ng/mL Final     Comment:     Vitamin D deficiency.........<10 ng/mL                              Vitamin D insufficiency......10-29 ng/mL       Vitamin D sufficiency........> or equal to 30 ng/mL  Vitamin D toxicity............>100 ng/mL         Assessment and Plan:     1. Encounter for annual health examination    --Nutrition: Discussed the importance of moderate caffeine intake. Adhering to a low sodium, low saturated fat/low cholesterol diet.   --Exercise: Discussed the importance of regular exercise. At least 30 minutes 5 days per week.   --Immunizations reviewed.  --Discussed benefits of maintaining up to date health maintenance.    2. Cervical radiculopathy    Chronic, stable, continue current medication, [facility]    3. Hypertension associated with type 2 diabetes mellitus    Chronic, stable, continue current medication    - Comprehensive Metabolic Panel; Future  - Lipid Panel; Future    4. Type 2 diabetes mellitus without complication, without long-term current use of insulin    Chronic, stable, diet controlled     - Hemoglobin A1C; Future  - Lipid Panel; Future    5. Prostate cancer  6. Erectile dysfunction following radical prostatectomy    Chronic, stable. Could not afford PET co pay. Repeat PSA today.     - PROSTATE SPECIFIC ANTIGEN, DIAGNOSTIC; Future    7. Obstructive sleep apnea (adult) (pediatric)    Newly diagnosed. Will  CPAP soon and begin therapy.     8. Screening examination for STD (sexually transmitted disease)  - HIV 1/2 Ag/Ab (4th Gen); Future  - HEPATITIS PANEL, ACUTE; Future  - RPR; Future    9. Need for vaccination  - Influenza - Quadrivalent (PF)

## 2024-03-01 LAB — RPR SER QL: NORMAL

## 2024-04-15 RX ORDER — CLOTRIMAZOLE AND BETAMETHASONE DIPROPIONATE 10; .64 MG/G; MG/G
CREAM TOPICAL
Qty: 45 G | Refills: 0 | OUTPATIENT
Start: 2024-04-15

## 2024-04-29 ENCOUNTER — TELEPHONE (OUTPATIENT)
Dept: PODIATRY | Facility: CLINIC | Age: 59
End: 2024-04-29
Payer: COMMERCIAL

## 2024-04-30 ENCOUNTER — OFFICE VISIT (OUTPATIENT)
Dept: PODIATRY | Facility: CLINIC | Age: 59
End: 2024-04-30
Payer: COMMERCIAL

## 2024-04-30 VITALS
WEIGHT: 170.63 LBS | HEART RATE: 71 BPM | DIASTOLIC BLOOD PRESSURE: 85 MMHG | BODY MASS INDEX: 28.43 KG/M2 | SYSTOLIC BLOOD PRESSURE: 130 MMHG | HEIGHT: 65 IN

## 2024-04-30 DIAGNOSIS — B35.3 TINEA PEDIS OF BOTH FEET: ICD-10-CM

## 2024-04-30 DIAGNOSIS — E11.9 ENCOUNTER FOR DIABETIC FOOT EXAM: Primary | ICD-10-CM

## 2024-04-30 DIAGNOSIS — E11.9 TYPE 2 DIABETES MELLITUS WITHOUT COMPLICATION, WITHOUT LONG-TERM CURRENT USE OF INSULIN: ICD-10-CM

## 2024-04-30 PROCEDURE — 1159F MED LIST DOCD IN RCRD: CPT | Mod: CPTII,S$GLB,, | Performed by: PODIATRIST

## 2024-04-30 PROCEDURE — 3075F SYST BP GE 130 - 139MM HG: CPT | Mod: CPTII,S$GLB,, | Performed by: PODIATRIST

## 2024-04-30 PROCEDURE — 99999 PR PBB SHADOW E&M-EST. PATIENT-LVL III: CPT | Mod: PBBFAC,,, | Performed by: PODIATRIST

## 2024-04-30 PROCEDURE — 4010F ACE/ARB THERAPY RXD/TAKEN: CPT | Mod: CPTII,S$GLB,, | Performed by: PODIATRIST

## 2024-04-30 PROCEDURE — 3079F DIAST BP 80-89 MM HG: CPT | Mod: CPTII,S$GLB,, | Performed by: PODIATRIST

## 2024-04-30 PROCEDURE — 3044F HG A1C LEVEL LT 7.0%: CPT | Mod: CPTII,S$GLB,, | Performed by: PODIATRIST

## 2024-04-30 PROCEDURE — 3008F BODY MASS INDEX DOCD: CPT | Mod: CPTII,S$GLB,, | Performed by: PODIATRIST

## 2024-04-30 PROCEDURE — 1160F RVW MEDS BY RX/DR IN RCRD: CPT | Mod: CPTII,S$GLB,, | Performed by: PODIATRIST

## 2024-04-30 PROCEDURE — 99214 OFFICE O/P EST MOD 30 MIN: CPT | Mod: S$GLB,,, | Performed by: PODIATRIST

## 2024-04-30 RX ORDER — CLOTRIMAZOLE AND BETAMETHASONE DIPROPIONATE 10; .64 MG/G; MG/G
CREAM TOPICAL DAILY
Qty: 45 G | Refills: 1 | Status: SHIPPED | OUTPATIENT
Start: 2024-04-30

## 2024-04-30 RX ORDER — MELATONIN 3 MG
1 CAPSULE ORAL NIGHTLY
COMMUNITY
Start: 2024-02-27

## 2024-04-30 RX ORDER — GABAPENTIN 300 MG/1
TABLET, FILM COATED ORAL
COMMUNITY
Start: 2024-03-06 | End: 2024-09-26

## 2024-04-30 RX ORDER — LOSARTAN POTASSIUM 100 MG/1
1 TABLET ORAL DAILY
COMMUNITY
Start: 2023-08-28

## 2024-04-30 NOTE — PROGRESS NOTES
Subjective:      Patient ID: Roberto Edgar is a 59 y.o. male.    Chief Complaint:   Diabetic Foot Exam (PCP- 02/29/2024/Mackenzie Grant NP) and Foot Pain (Left foot)    Roberto is a 59 y.o. male who presents to the clinic upon referral from Dr. Teresa gallegos. provider found  for evaluation and treatment of diabetic feet. Roberto has a past medical history of Erectile dysfunction due to arterial insufficiency (5/24/2018), Hypertension, Prediabetes (5/3/2019), Prostate cancer (8/24/2020), and Shoulder pain, bilateral.      Here for yearly foot exam  Requesting refill of cream  [facility] will see if can go to neurology for tightness in the ankle/foot left      Last visit:    Rx lotrisone   - d/w pt itching likely nerve pain from previous foot injury; currently asymptomatic today    - now getting skin issues secondary  to scratching regimen Lotrisone    - recommend get EMG/NCV [facility]    - [facility] not [facility]able today; consider repeat    F/u for yearly foot exam     PCP: Coral Rodriguez MD    Date Last Seen by PCP: 2/29/24    Current shoe gear:  boots     Hemoglobin A1C   Date Value Ref Range Status   02/29/2024 6.1 (H) 4.0 - 5.6 % Final     Comment:     ADA Screening Guidelines:  5.7-6.4%  Consistent with prediabetes  >or=6.5%  Consistent with diabetes    High levels of fetal hemoglobin interfere with the HbA1C  assay. Heterozygous hemoglobin variants (HbS, HgC, etc)do  not significantly interfere with this assay.   However, presence of multiple variants may affect accuracy.     08/23/2023 5.8 (H) 4.0 - 5.6 % Final     Comment:     ADA Screening Guidelines:  5.7-6.4%  Consistent with prediabetes  >or=6.5%  Consistent with diabetes    High levels of fetal hemoglobin interfere with the HbA1C  assay. Heterozygous hemoglobin variants (HbS, HgC, etc)do  not significantly interfere with this assay.   However, presence of multiple variants may affect accuracy.     04/20/2023 6.1  (H) 4.0 - 5.6 % Final     Comment:     ADA Screening Guidelines:  5.7-6.4%  Consistent with prediabetes  >or=6.5%  Consistent with diabetes    High levels of fetal hemoglobin interfere with the HbA1C  assay. Heterozygous hemoglobin variants (HbS, HgC, etc)do  not significantly interfere with this assay.   However, presence of multiple variants may affect accuracy.            Past Medical History:   Diagnosis Date    Erectile dysfunction due to arterial insufficiency 5/24/2018    Hypertension     Prediabetes 5/3/2019    Prostate cancer 8/24/2020    Shoulder pain, bilateral      Past Surgical History:   Procedure Laterality Date    COLONOSCOPY N/A 8/2/2018    Procedure: COLONOSCOPY;  Surgeon: Saeid Velazquez MD;  Location: [facility] (4TH FLR);  Service: Endoscopy;  Laterality: N/A;    HERNIA REPAIR      ROBOT-ASSISTED LAPAROSCOPIC PELVIC LYMPHADENECTOMY Bilateral 10/19/2020    Procedure: ROBOTIC LYMPHADENECTOMY, PELVIS;  Surgeon: Marshall Perdue MD;  Location: [facility] OR [address] [place] [address];  Service: Urology;  Laterality: Bilateral;    ROBOT-ASSISTED LAPAROSCOPIC PROSTATECTOMY USING DA SUSHMA XI N/A 10/19/2020    Procedure: XI ROBOTIC PROSTATECTOMY;  Surgeon: Marshall Perdue MD;  [facility];  Service: Urology;  Laterality: N/A;  3hrs/ gen with regional    TESTICLE SURGERY Right 02/2006    UMBILICAL HERNIA REPAIR N/A unknown     Current Outpatient Medications on File Prior to Visit   Medication Sig Dispense Refill    b complex vitamins capsule Take 1 capsule by mouth once daily.      blood sugar diagnostic Strp Check blood sugars two times daily. Insurance preferred Freestyle strips. ICD10 E11.9 180 strip 3    gabapentin (NEURONTIN) 300 MG capsule TAKE ONE CAPSULE BY MOUTH THREE TIMES A DAY FOR NEUROPATHIC PAIN      gabapentin 300 mg Tb24 TAKE ONE CAPSULE BY MOUTH THREE TIMES A DAY FOR NEUROPATHIC PAIN      lancets (FREESTYLE LANCETS) 28 gauge Misc 1 lancet to check BG bidprn 200 each 3    losartan (COZAAR) 100  MG tablet Take 1 tablet by mouth once daily 90 tablet 3    losartan (COZAAR) 100 MG tablet Take 1 tablet by mouth once daily.      melatonin 3 mg Cap Take 1 tablet by mouth every evening.      multivitamin (THERAGRAN) per tablet Take 1 tablet by mouth once daily.      omega-3 fatty acids/fish oil (FISH OIL-OMEGA-3 FATTY ACIDS) 300-1,000 mg capsule Take 1 capsule by mouth once daily.      papaverine 30 mg/mL injection Add: Phentolamine 10 mg   Add: PGE1 1mcg   Sig:  Inject 10 units as directed; titrate up by 5 units as needed. 10 mL 11    [DISCONTINUED] clotrimazole-betamethasone 1-0.05% (LOTRISONE) cream APPLY TOPICALLY TWICE DAILY FOR 14 DAYS 45 g 0     No current facility-administered medications on file prior to visit.     Review of patient's allergies indicates:  No Known Allergies    Review of Systems   Constitutional: Negative for chills, decreased appetite, fever, malaise/fatigue, night sweats, weight gain and weight loss.   Cardiovascular:  Negative for chest pain, claudication, dyspnea on exertion, leg swelling, palpitations and syncope.   Respiratory:  Negative for cough and shortness of breath.    Endocrine: Negative for cold intolerance and heat intolerance.   Hematologic/Lymphatic: Negative for bleeding problem. Does not bruise/bleed easily.   Skin:  Positive for dry skin and itching. Negative for color change, flushing, nail changes, poor wound healing, rash, skin cancer, suspicious lesions and unusual hair distribution.   Musculoskeletal:  Positive for neck pain. Negative for arthritis, back pain, falls, gout, joint pain, joint swelling, muscle cramps, muscle weakness, myalgias and stiffness.   Gastrointestinal:  Negative for diarrhea, nausea and vomiting.   Neurological:  Positive for paresthesias. Negative for dizziness, focal weakness, light-headedness, numbness, tremors, vertigo and weakness.   Psychiatric/Behavioral:  Negative for altered mental status and depression. The patient does not have  "insomnia.    Allergic/Immunologic: Negative.            Objective:       Vitals:    04/30/24 0828   BP: 130/85   Pulse: 71   Weight: 77.4 kg (170 lb 10.2 oz)   Height: 5' 5" (1.651 m)   PainSc:   5   PainLoc: Foot   77.4 kg (170 lb 10.2 oz)     Physical Exam  Vitals reviewed.   Constitutional:       General: He is not in acute distress.     Appearance: He is well-developed. He is not ill-appearing, toxic-appearing or diaphoretic.      Comments: Proper supportive shoegear      Cardiovascular:      Pulses:           Dorsalis pedis pulses are 2+ on the right side and 2+ on the left side.        Posterior tibial pulses are 2+ on the right side and 2+ on the left side.   Musculoskeletal:         General: No swelling, tenderness or deformity.      Right lower leg: No edema.      Left lower leg: No edema.      Right ankle: Normal.      Right Achilles Tendon: Normal.      Left ankle: Normal.      Left Achilles Tendon: Normal.      Right foot: Decreased range of motion. No deformity, tenderness or bony tenderness.      Left foot: Decreased range of motion. No deformity, tenderness or bony tenderness.      Comments: No significant deformity noted    Flexible pes planus foot type w/ medial arch collapse and mild gastroc equinus      Strength 5/5 bilateral   Feet:      Right foot:      Protective Sensation: 10 sites tested.  10 sites sensed.      Skin integrity: No ulcer, blister, skin breakdown, erythema, warmth, callus or dry skin.      Toenail Condition: Right toenails are normal.      Left foot:      Protective Sensation: 10 sites tested.  10 sites sensed.      Skin integrity: No ulcer, blister, skin breakdown, erythema, warmth, callus or dry skin.      Toenail Condition: Left toenails are normal.      Comments:      Interspaces are clear    [place] intact  Skin:     General: Skin is warm and dry.      Capillary Refill: Capillary refill takes 2 to 3 seconds.      Coloration: Skin is not pale.      Findings: No " "erythema or rash.      Nails: There is no clubbing.   Neurological:      Mental Status: He is alert and oriented to person, place, and time.      Gait: Gait is intact.   Psychiatric:         Attention and Perception: Attention normal.         Mood and Affect: Mood normal.         Speech: Speech normal.         Behavior: Behavior normal.         Thought Content: Thought content normal.         Cognition and Memory: Cognition normal.         Judgment: Judgment normal.               Assessment:       Encounter Diagnoses   Name Primary?    Encounter for diabetic foot exam Yes    Type 2 diabetes mellitus without complication, without long-term current use of insulin     Tinea pedis of both feet          Plan:       Roberto Edgar" was seen today for diabetic foot exam and foot pain.    Diagnoses and all orders for this visit:    Encounter for diabetic foot exam    Type 2 diabetes mellitus without complication, without long-term current use of insulin    Tinea pedis of both feet    Other orders  -     clotrimazole-betamethasone 1-0.05% (LOTRISONE) cream; Apply topically once daily. Apply to affected area for fourteen days      I counseled the patient on his conditions, their implications and medical management.    DM foot exam    Doing well    Refill lotrisone       F/u yearly    No follow-ups on file.       "

## 2024-05-16 DIAGNOSIS — C61 PROSTATE CANCER: Primary | ICD-10-CM

## 2024-06-06 ENCOUNTER — LAB VISIT (OUTPATIENT)
Dept: LAB | Facility: HOSPITAL | Age: 59
End: 2024-06-06
Attending: RADIOLOGY
Payer: COMMERCIAL

## 2024-06-06 DIAGNOSIS — C61 PROSTATE CANCER: ICD-10-CM

## 2024-06-06 LAB
COMPLEXED PSA SERPL-MCNC: 1.4 NG/ML (ref 0–4)
TESTOST SERPL-MCNC: 463 NG/DL (ref 304–1227)

## 2024-06-06 PROCEDURE — 84403 ASSAY OF TOTAL TESTOSTERONE: CPT | Performed by: RADIOLOGY

## 2024-06-06 PROCEDURE — 84153 ASSAY OF PSA TOTAL: CPT | Performed by: RADIOLOGY

## 2024-06-06 PROCEDURE — 36415 COLL VENOUS BLD VENIPUNCTURE: CPT | Mod: PO | Performed by: RADIOLOGY

## 2024-06-17 ENCOUNTER — HOSPITAL ENCOUNTER (OUTPATIENT)
Dept: RADIOLOGY | Facility: HOSPITAL | Age: 59
Discharge: HOME | End: 2024-06-17
Attending: RADIOLOGY
Payer: COMMERCIAL

## 2024-06-17 DIAGNOSIS — C61 PROSTATE CANCER: ICD-10-CM

## 2024-06-17 PROCEDURE — A9595 HC PIFLUFOLASTAT F-18, DX, PER 1 MCI: HCPCS | Mod: TB | Performed by: RADIOLOGY

## 2024-06-17 PROCEDURE — 78815 PET IMAGE W/CT SKULL-THIGH: CPT | Mod: TC

## 2024-06-17 RX ADMIN — PIFLUFOLASTAT F-18 10.2 MILLICURIE: 80 INJECTION INTRAVENOUS at 02:06

## 2024-07-12 DIAGNOSIS — C61 PROSTATE CANCER: Primary | ICD-10-CM

## 2024-07-25 ENCOUNTER — PATIENT OUTREACH (OUTPATIENT)
Dept: ADMINISTRATIVE | Facility: HOSPITAL | Age: 59
End: 2024-07-25
Payer: COMMERCIAL

## 2024-07-31 ENCOUNTER — HOSPITAL ENCOUNTER (OUTPATIENT)
Dept: RADIOLOGY | Facility: HOSPITAL | Age: 59
Discharge: HOME | End: 2024-07-31
Attending: RADIOLOGY
Payer: COMMERCIAL

## 2024-07-31 DIAGNOSIS — C61 PROSTATE CANCER: ICD-10-CM

## 2024-07-31 PROCEDURE — 25500020 PHARM REV CODE 255: Performed by: RADIOLOGY

## 2024-07-31 PROCEDURE — 72197 MRI PELVIS W/O & W/DYE: CPT | Mod: TC

## 2024-07-31 PROCEDURE — 72197 MRI PELVIS W/O & W/DYE: CPT | Mod: 26,,, | Performed by: RADIOLOGY

## 2024-07-31 PROCEDURE — A9585 GADOBUTROL INJECTION: HCPCS | Performed by: RADIOLOGY

## 2024-07-31 RX ORDER — GADOBUTROL 604.72 MG/ML
10 INJECTION INTRAVENOUS
Status: COMPLETED | OUTPATIENT
Start: 2024-07-31 | End: 2024-07-31

## 2024-07-31 RX ADMIN — GADOBUTROL 10 ML: 604.72 INJECTION INTRAVENOUS at 02:07

## 2024-09-18 DIAGNOSIS — E11.9 TYPE 2 DIABETES MELLITUS WITHOUT COMPLICATION: ICD-10-CM

## 2024-10-01 ENCOUNTER — PATIENT MESSAGE (OUTPATIENT)
Dept: INTERNAL MEDICINE | Facility: CLINIC | Age: 59
End: 2024-10-01
Payer: COMMERCIAL

## 2024-11-05 DIAGNOSIS — I10 ESSENTIAL HYPERTENSION: ICD-10-CM

## 2024-11-05 RX ORDER — LOSARTAN POTASSIUM 100 MG/1
TABLET ORAL
Qty: 90 TABLET | Refills: 0 | Status: SHIPPED | OUTPATIENT
Start: 2024-11-05

## 2024-11-05 NOTE — TELEPHONE ENCOUNTER
Care Due:                  Date            Visit Type   Department     Provider  --------------------------------------------------------------------------------                                EP -                              PRIMARY      MET INTERNAL  Last Visit: 08-      CARE ([facility])   MEDICINE       Coral Rodriguez  Next Visit: None Scheduled  None         None Found                                                            Last  Test          Frequency    Reason                     Performed    Due Date  --------------------------------------------------------------------------------    Office Visit  15 months..  losartan.................  08- 11-    [facility] Embedded Care Due Messages. Reference number: [id number].   11/05/2024 7:02:32 AM CST

## 2024-11-05 NOTE — TELEPHONE ENCOUNTER
Provider Staff:  Action required for this patient    Requires appointment      Please see care gap opportunities below in Care Due Message.    Thanks!  [facility]     Appointments      Date Provider   Last Visit   8/16/2023 Coral Rodriguez MD   Next Visit   Visit date not found Coral Rodriguez MD     Refill Decision Note   Roberto Edgar  is requesting a refill authorization.  Brief Assessment and Rationale for Refill:  Approve     Medication Therapy Plan:         Comments:     Note composed:10:30 AM 11/05/2024

## 2024-11-06 ENCOUNTER — OFFICE VISIT (OUTPATIENT)
Dept: RADIATION ONCOLOGY | Facility: CLINIC | Age: 59
End: 2024-11-06
Payer: COMMERCIAL

## 2024-11-06 VITALS
DIASTOLIC BLOOD PRESSURE: 92 MMHG | RESPIRATION RATE: 14 BRPM | SYSTOLIC BLOOD PRESSURE: 149 MMHG | OXYGEN SATURATION: 98 % | TEMPERATURE: 98 F | WEIGHT: 176.13 LBS | HEIGHT: 65 IN | HEART RATE: 76 BPM | BODY MASS INDEX: 29.34 KG/M2

## 2024-11-06 DIAGNOSIS — C61 PROSTATE CANCER: Primary | ICD-10-CM

## 2024-11-06 PROCEDURE — 99212 OFFICE O/P EST SF 10 MIN: CPT | Mod: S$GLB,,, | Performed by: RADIOLOGY

## 2024-11-06 PROCEDURE — 3080F DIAST BP >= 90 MM HG: CPT | Mod: CPTII,S$GLB,, | Performed by: RADIOLOGY

## 2024-11-06 PROCEDURE — 3077F SYST BP >= 140 MM HG: CPT | Mod: CPTII,S$GLB,, | Performed by: RADIOLOGY

## 2024-11-06 PROCEDURE — 3044F HG A1C LEVEL LT 7.0%: CPT | Mod: CPTII,S$GLB,, | Performed by: RADIOLOGY

## 2024-11-06 PROCEDURE — 1160F RVW MEDS BY RX/DR IN RCRD: CPT | Mod: CPTII,S$GLB,, | Performed by: RADIOLOGY

## 2024-11-06 PROCEDURE — 4010F ACE/ARB THERAPY RXD/TAKEN: CPT | Mod: CPTII,S$GLB,, | Performed by: RADIOLOGY

## 2024-11-06 PROCEDURE — 3008F BODY MASS INDEX DOCD: CPT | Mod: CPTII,S$GLB,, | Performed by: RADIOLOGY

## 2024-11-06 PROCEDURE — 99999 PR PBB SHADOW E&M-EST. PATIENT-LVL IV: CPT | Mod: PBBFAC,,, | Performed by: RADIOLOGY

## 2024-11-06 PROCEDURE — 1159F MED LIST DOCD IN RCRD: CPT | Mod: CPTII,S$GLB,, | Performed by: RADIOLOGY

## 2024-11-08 NOTE — PROGRESS NOTES
[facility] - Radiation Oncology     Patient ID: Roberto Edgar is a 59 y.o. male.    Chief Complaint: No chief complaint on file.      Mr. 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  He initially presented after screening PSA in June of 2020 returned at 7 ng/ml.  Biopsies from the Rt. base revealed [place] 8 (4+4) adenocarcinoma involving 15% of specimen.  Cores from the Rt. apex, Rt. mid gland Lt. apex revealed [place] 6 (3+3) adenocarcinoma involving 5% of the specimens.  He underwent RALP with bilateral lymph node dissection on 10/19/20.  Pathology revealed [place] 8 (4+4) adenocarcinoma involving 35% of the prostate with perineural and lymphovascular invasion.  There was [place]ner 7 (4+3) tumor noted in the separate bladder neck specimen.  Tumor extended to the cauterized margin. One Rt. and 1 Lt. pelvic node was negative for tumor involvement.  Postoperative PSA on 11/24/20 returned at 0.74 ng/ml and increased to 0.95 ng/ml.  Axumin scan on 2/1/21 which was negative for evidence of local or distant recurrent disease.  He was referred for salvage therapy and we enrolled the patient on NRG .  He began hormonal therapy with Casodex and Lupron, he completed radiotherapy to the prostate bed and pelvic nodes on 6/30/21.  Post treatment, his PSA has continued to increase.  Work up to date has been negative.  Today the patient states he feels well.  No complaints.       Review of Systems   Constitutional:  Negative for activity change, appetite change, chills and fever.   Genitourinary:  Negative for bladder incontinence, difficulty urinating, dysuria, frequency and hematuria.     Physical Exam  Constitutional:       General: He is not in acute distress.     Appearance: Normal appearance.   Neurological:      Mental Status: He is alert and oriented to person, place, and time.   Psychiatric:         Mood and Affect: Mood  normal.         Judgment: Judgment normal.        Latest Reference Range & Units 09/19/23 14:32 02/29/24 14:27 06/06/24 11:37   PSA Diagnostic 0.00 - 4.00 ng/mL 0.56 0.58 1.4          Assessment and Plan    History of prostate cancer with biochemical failure.  The [facility]  Will try to obtain those results.  Depending on the results we would consider a repeat PSMA scan.  Scan in June of 2024 was negative.  The patient would like to [facility].  [facility]

## 2024-12-12 ENCOUNTER — PATIENT MESSAGE (OUTPATIENT)
Dept: RADIATION ONCOLOGY | Facility: CLINIC | Age: 59
End: 2024-12-12
Payer: COMMERCIAL

## 2025-03-02 ENCOUNTER — TELEPHONE (OUTPATIENT)
Dept: PHARMACY | Facility: CLINIC | Age: 60
End: 2025-03-02
Payer: COMMERCIAL

## 2025-03-02 NOTE — TELEPHONE ENCOUNTER
[facility]/Population Health Chart Review & Patient Outreach Details For Medication Adherence Project    Reason for Outreach Encounter: 3rd Party payor non-compliance report ([employer], [employer], [facility], etc)  2.  Patient Outreach Method: [employer] message  3.   Medication in question: losartan    LAST FILLED: 8/9/24 for 90 day supply  Hypertension Medications              losartan (COZAAR) 100 MG tablet Take 1 tablet by mouth once daily.    losartan (COZAAR) 100 MG tablet Take 1 tablet by mouth once daily              4.  Reviewed and or Updates Made To: Patient Chart  5. Outreach Outcomes and/or actions taken: Sent inquiry to patient: Waiting for response.

## 2025-03-26 ENCOUNTER — PATIENT MESSAGE (OUTPATIENT)
Dept: INTERNAL MEDICINE | Facility: CLINIC | Age: 60
End: 2025-03-26
Payer: COMMERCIAL

## 2025-04-09 ENCOUNTER — TELEPHONE (OUTPATIENT)
Dept: PHARMACY | Facility: CLINIC | Age: 60
End: 2025-04-09
Payer: COMMERCIAL

## 2025-04-09 NOTE — TELEPHONE ENCOUNTER
[facility]/Population Health Chart Review & Patient Outreach Details For Medication Adherence Project    Reason for Outreach Encounter: 3rd Party payor non-compliance report ([employer], [employer], [facility], etc)  2.  Patient Outreach Method: [employer] message  3.   Medication in question: losartan   LAST FILLED: 8/9/24 for 90 day supply  Hypertension Medications              losartan (COZAAR) 100 MG tablet Take 1 tablet by mouth once daily.    losartan (COZAAR) 100 MG tablet Take 1 tablet by mouth once daily              4.  Reviewed and or Updates Made To: Patient Chart  5. Outreach Outcomes and/or actions taken: Sent inquiry to patient: Waiting for response.

## 2025-05-13 ENCOUNTER — TELEPHONE (OUTPATIENT)
Dept: PHARMACY | Facility: CLINIC | Age: 60
End: 2025-05-13
Payer: COMMERCIAL

## 2025-05-13 NOTE — TELEPHONE ENCOUNTER
[facility]/Population Health Chart Review & Patient Outreach Details For Medication Adherence Project    Reason for Outreach Encounter: 3rd Party payor non-compliance report ([employer], [employer], [facility], etc)  2.  Patient Outreach Method: [employer] message  3.   Medication in question: losartan    LAST FILLED: 8/9/24 for 90 day supply  Hypertension Medications              losartan (COZAAR) 100 MG tablet Take 1 tablet by mouth once daily.    losartan (COZAAR) 100 MG tablet Take 1 tablet by mouth once daily              4.  Reviewed and or Updates Made To: Patient Chart  5. Outreach Outcomes and/or actions taken: Sent inquiry to patient: Waiting for response.

## 2025-06-12 ENCOUNTER — TELEPHONE (OUTPATIENT)
Dept: PHARMACY | Facility: CLINIC | Age: 60
End: 2025-06-12
Payer: COMMERCIAL

## 2025-06-13 NOTE — TELEPHONE ENCOUNTER
[facility]/Population Health Chart Review & Patient Outreach Details For Medication Adherence Project    Reason for Outreach Encounter: 3rd Party payor non-compliance report ([employer], [employer], [facility], etc)  2.  Patient Outreach Method: [employer] message  3.   Medication in question: losartan    LAST FILLED: 8/9/24 for 90 day supply  Hypertension Medications              losartan (COZAAR) 100 MG tablet Take 1 tablet by mouth once daily.    losartan (COZAAR) 100 MG tablet Take 1 tablet by mouth once daily              4.  Reviewed and or Updates Made To: Patient Chart  5. Outreach Outcomes and/or actions taken: Sent inquiry to patient: Waiting for response.

## 2025-06-23 ENCOUNTER — PATIENT MESSAGE (OUTPATIENT)
Dept: ADMINISTRATIVE | Facility: HOSPITAL | Age: 60
End: 2025-06-23
Payer: COMMERCIAL

## 2025-06-27 ENCOUNTER — PATIENT OUTREACH (OUTPATIENT)
Dept: ADMINISTRATIVE | Facility: HOSPITAL | Age: 60
End: 2025-06-27
Payer: COMMERCIAL

## 2025-06-27 VITALS — DIASTOLIC BLOOD PRESSURE: 83 MMHG | SYSTOLIC BLOOD PRESSURE: 134 MMHG

## (undated) DEVICE — DRAPE ARM DAVINCI XI

## (undated) DEVICE — Device

## (undated) DEVICE — COVER TIP CURVED SCISSORS XI

## (undated) DEVICE — ELECTRODE REM PLYHSV RETURN 9

## (undated) DEVICE — BAG TISS RETRV MONARCH 10MM

## (undated) DEVICE — DRAIN CHANNEL ROUND 10FR

## (undated) DEVICE — SOL NS 1000CC

## (undated) DEVICE — SUT 2/0 36IN COATED VICRYL

## (undated) DEVICE — NDL INSUF ULTRA VERESS 120MM

## (undated) DEVICE — LUBRICANT SURGILUBE 2 OZ

## (undated) DEVICE — SUT MCRYL PLUS 4-0 PS2 27IN

## (undated) DEVICE — TRAY MINOR GEN SURG

## (undated) DEVICE — PORT AIRSEAL 12/120MM LPI

## (undated) DEVICE — SUT ABS CLIP LAPRA-TY CTD

## (undated) DEVICE — ADHESIVE DERMABOND ADVANCED

## (undated) DEVICE — SYR 50ML CATH TIP

## (undated) DEVICE — TRAY FOLEY 16FR INFECTION CONT

## (undated) DEVICE — SEAL UNIVERSAL 5MM-8MM XI

## (undated) DEVICE — DRAPE ABDOMINAL TIBURON 14X11

## (undated) DEVICE — SUT PDS II 0 CT-1 VIL MONO

## (undated) DEVICE — LEGGINGS 48X31 INCH

## (undated) DEVICE — SUT MONOCRYL 3-0 RB1

## (undated) DEVICE — IRRIGATOR ENDOSCOPY DISP.

## (undated) DEVICE — EVACUATOR WOUND BULB 100CC

## (undated) DEVICE — GOWN SURGICAL X-LARGE

## (undated) DEVICE — COVER LIGHT HANDLE

## (undated) DEVICE — DRAPE COLUMN DAVINCI XI

## (undated) DEVICE — DRAPE SCOPE PILLOW WARMER

## (undated) DEVICE — TROCAR ENDOPATH XCEL 5MM 7.5CM

## (undated) DEVICE — SOL ELECTROLUBE ANTI-STIC

## (undated) DEVICE — SCISSOR 5MMX35CM DIRECT DRIVE

## (undated) DEVICE — SET TRI-LUMEN FILTERED TUBE

## (undated) DEVICE — SOL WATER STRL IRR 1000ML

## (undated) DEVICE — CLIP HEMO-LOK MLX LARGE LF

## (undated) DEVICE — SUT MONOCRYL 3-0 UNDYED RB1